# Patient Record
Sex: MALE | Employment: OTHER | URBAN - METROPOLITAN AREA
[De-identification: names, ages, dates, MRNs, and addresses within clinical notes are randomized per-mention and may not be internally consistent; named-entity substitution may affect disease eponyms.]

---

## 2019-11-06 ENCOUNTER — APPOINTMENT (OUTPATIENT)
Dept: CT IMAGING | Age: 74
End: 2019-11-06
Attending: EMERGENCY MEDICINE
Payer: SELF-PAY

## 2019-11-06 ENCOUNTER — HOSPITAL ENCOUNTER (EMERGENCY)
Age: 74
Discharge: HOME | End: 2019-11-07
Attending: EMERGENCY MEDICINE
Payer: SELF-PAY

## 2019-11-06 DIAGNOSIS — Z87.898 H/O IDIOPATHIC SEIZURE: ICD-10-CM

## 2019-11-06 DIAGNOSIS — R40.4 TRANSIENT ALTERATION OF AWARENESS: Primary | ICD-10-CM

## 2019-11-06 LAB
APPEARANCE UR: CLEAR
BACTERIA URNS QL MICRO: NEGATIVE /HPF
BASOPHILS # BLD: 0 K/UL (ref 0–0.1)
BASOPHILS NFR BLD: 0 % (ref 0–1)
BILIRUB UR QL: NEGATIVE
COLOR UR: ABNORMAL
DIFFERENTIAL METHOD BLD: ABNORMAL
EOSINOPHIL # BLD: 0.2 K/UL (ref 0–0.4)
EOSINOPHIL NFR BLD: 2 % (ref 0–7)
EPITH CASTS URNS QL MICRO: ABNORMAL /LPF
ERYTHROCYTE [DISTWIDTH] IN BLOOD BY AUTOMATED COUNT: 13.5 % (ref 11.5–14.5)
GLUCOSE UR STRIP.AUTO-MCNC: NEGATIVE MG/DL
HCT VFR BLD AUTO: 34.2 % (ref 36.6–50.3)
HGB BLD-MCNC: 11.2 G/DL (ref 12.1–17)
HGB UR QL STRIP: NEGATIVE
HYALINE CASTS URNS QL MICRO: ABNORMAL /LPF (ref 0–5)
IMM GRANULOCYTES # BLD AUTO: 0 K/UL (ref 0–0.04)
IMM GRANULOCYTES NFR BLD AUTO: 0 % (ref 0–0.5)
KETONES UR QL STRIP.AUTO: ABNORMAL MG/DL
LACTATE BLD-SCNC: 0.75 MMOL/L (ref 0.4–2)
LEUKOCYTE ESTERASE UR QL STRIP.AUTO: NEGATIVE
LYMPHOCYTES # BLD: 0.7 K/UL (ref 0.8–3.5)
LYMPHOCYTES NFR BLD: 9 % (ref 12–49)
MCH RBC QN AUTO: 31.2 PG (ref 26–34)
MCHC RBC AUTO-ENTMCNC: 32.7 G/DL (ref 30–36.5)
MCV RBC AUTO: 95.3 FL (ref 80–99)
MONOCYTES # BLD: 0.6 K/UL (ref 0–1)
MONOCYTES NFR BLD: 8 % (ref 5–13)
NEUTS SEG # BLD: 6.1 K/UL (ref 1.8–8)
NEUTS SEG NFR BLD: 81 % (ref 32–75)
NITRITE UR QL STRIP.AUTO: NEGATIVE
NRBC # BLD: 0 K/UL (ref 0–0.01)
NRBC BLD-RTO: 0 PER 100 WBC
PH UR STRIP: 6 [PH] (ref 5–8)
PLATELET # BLD AUTO: 157 K/UL (ref 150–400)
PMV BLD AUTO: 10.9 FL (ref 8.9–12.9)
PROT UR STRIP-MCNC: NEGATIVE MG/DL
RBC # BLD AUTO: 3.59 M/UL (ref 4.1–5.7)
RBC #/AREA URNS HPF: ABNORMAL /HPF (ref 0–5)
RBC MORPH BLD: ABNORMAL
SP GR UR REFRACTOMETRY: 1.02 (ref 1–1.03)
UA: UC IF INDICATED,UAUC: ABNORMAL
UROBILINOGEN UR QL STRIP.AUTO: 1 EU/DL (ref 0.2–1)
WBC # BLD AUTO: 7.6 K/UL (ref 4.1–11.1)
WBC URNS QL MICRO: ABNORMAL /HPF (ref 0–4)

## 2019-11-06 PROCEDURE — 81001 URINALYSIS AUTO W/SCOPE: CPT

## 2019-11-06 PROCEDURE — 93005 ELECTROCARDIOGRAM TRACING: CPT

## 2019-11-06 PROCEDURE — 85025 COMPLETE CBC W/AUTO DIFF WBC: CPT

## 2019-11-06 PROCEDURE — 99285 EMERGENCY DEPT VISIT HI MDM: CPT

## 2019-11-06 PROCEDURE — 70450 CT HEAD/BRAIN W/O DYE: CPT

## 2019-11-06 PROCEDURE — 83605 ASSAY OF LACTIC ACID: CPT

## 2019-11-06 PROCEDURE — 84484 ASSAY OF TROPONIN QUANT: CPT

## 2019-11-06 PROCEDURE — 36415 COLL VENOUS BLD VENIPUNCTURE: CPT

## 2019-11-06 PROCEDURE — 80053 COMPREHEN METABOLIC PANEL: CPT

## 2019-11-06 RX ORDER — METOPROLOL TARTRATE 50 MG/1
50 TABLET ORAL 2 TIMES DAILY
COMMUNITY

## 2019-11-06 RX ORDER — DIVALPROEX SODIUM 500 MG/1
1000 TABLET, EXTENDED RELEASE ORAL DAILY
COMMUNITY

## 2019-11-06 RX ORDER — LISINOPRIL 40 MG/1
20 TABLET ORAL DAILY
COMMUNITY

## 2019-11-06 RX ORDER — HYDROXYZINE HYDROCHLORIDE 10 MG/1
10 TABLET, FILM COATED ORAL
COMMUNITY

## 2019-11-06 RX ORDER — ASPIRIN 325 MG
325 TABLET ORAL DAILY
COMMUNITY

## 2019-11-06 RX ORDER — LORATADINE 10 MG/1
10 TABLET ORAL DAILY
COMMUNITY

## 2019-11-06 RX ORDER — AMLODIPINE BESYLATE 10 MG/1
10 TABLET ORAL DAILY
COMMUNITY

## 2019-11-07 VITALS
TEMPERATURE: 97.9 F | HEART RATE: 57 BPM | BODY MASS INDEX: 26.45 KG/M2 | OXYGEN SATURATION: 97 % | WEIGHT: 184.75 LBS | HEIGHT: 70 IN | DIASTOLIC BLOOD PRESSURE: 69 MMHG | RESPIRATION RATE: 13 BRPM | SYSTOLIC BLOOD PRESSURE: 139 MMHG

## 2019-11-07 LAB
ALBUMIN SERPL-MCNC: 2.9 G/DL (ref 3.5–5)
ALBUMIN/GLOB SERPL: 0.9 {RATIO} (ref 1.1–2.2)
ALP SERPL-CCNC: 60 U/L (ref 45–117)
ALT SERPL-CCNC: 26 U/L (ref 12–78)
ANION GAP SERPL CALC-SCNC: 7 MMOL/L (ref 5–15)
AST SERPL-CCNC: 16 U/L (ref 15–37)
ATRIAL RATE: 59 BPM
BILIRUB SERPL-MCNC: 0.6 MG/DL (ref 0.2–1)
BUN SERPL-MCNC: 29 MG/DL (ref 6–20)
BUN/CREAT SERPL: 26 (ref 12–20)
CALCIUM SERPL-MCNC: 8.6 MG/DL (ref 8.5–10.1)
CALCULATED P AXIS, ECG09: 45 DEGREES
CALCULATED R AXIS, ECG10: 4 DEGREES
CALCULATED T AXIS, ECG11: 15 DEGREES
CHLORIDE SERPL-SCNC: 104 MMOL/L (ref 97–108)
CO2 SERPL-SCNC: 26 MMOL/L (ref 21–32)
CREAT SERPL-MCNC: 1.13 MG/DL (ref 0.7–1.3)
DIAGNOSIS, 93000: NORMAL
GLOBULIN SER CALC-MCNC: 3.4 G/DL (ref 2–4)
GLUCOSE SERPL-MCNC: 98 MG/DL (ref 65–100)
P-R INTERVAL, ECG05: 178 MS
POTASSIUM SERPL-SCNC: 3.5 MMOL/L (ref 3.5–5.1)
PROT SERPL-MCNC: 6.3 G/DL (ref 6.4–8.2)
Q-T INTERVAL, ECG07: 426 MS
QRS DURATION, ECG06: 86 MS
QTC CALCULATION (BEZET), ECG08: 421 MS
SODIUM SERPL-SCNC: 137 MMOL/L (ref 136–145)
TROPONIN I BLD-MCNC: <0.04 NG/ML (ref 0–0.08)
VENTRICULAR RATE, ECG03: 59 BPM

## 2019-11-07 PROCEDURE — 74011250637 HC RX REV CODE- 250/637: Performed by: EMERGENCY MEDICINE

## 2019-11-07 RX ORDER — LEVETIRACETAM 500 MG/1
1000 TABLET ORAL
Status: COMPLETED | OUTPATIENT
Start: 2019-11-07 | End: 2019-11-07

## 2019-11-07 RX ADMIN — LEVETIRACETAM 1000 MG: 500 TABLET, FILM COATED ORAL at 01:03

## 2019-11-07 NOTE — ED PROVIDER NOTES
EMERGENCY DEPARTMENT HISTORY AND PHYSICAL EXAM     ----------------------------------------------------------------------------  Please note that this dictation was completed with [email or web address], the computer voice recognition software. Quite often unanticipated grammatical, syntax, homophones, and other interpretive errors are inadvertently transcribed by the computer software. Please disregard these errors. Please excuse any errors that have escaped final proofreading  ----------------------------------------------------------------------------      Date: 11/6/2019  Patient Name: Soco Medellin    History of Presenting Illness     Chief Complaint   Patient presents with    Altered mental status     Pt was brought by EMS for AMS. EMS stated pt was slumped over the kitchen table and was confused and not able to answer any questions for them. Pt a/o x1. When asked questions pt keeps talking about \"not having any money in the bank\"       History Provided By:  Family, EMS, nursing facility      HPI: Soco Medellin is a 66 y.o. male, with significant pmhx of schizophrenia, PTSD, seizures, hypertension who presents via EMS to the ED with c/o altered mental status. Per Hossein Roberson who currently working at the [facility] [place] [facility] facility where patient resides notes that they were sitting at the table earlier today when the patient suddenly became unresponsive and leaning to the left side. Hossein Roberson notes that this lasted for several minutes when patient spontaneously became aroused and answering questions appropriately. Patient does not remember event. Patient with history of seizures and similar episode approximately 1 month ago as reported by his brother. The time of my evaluation patient is alert pleasantly disoriented to time. Understands that he is currently in the hospital but has no complaints. Patient cannot recall the events that led to his to the hospital this evening.   No further HPI information could be obtained at this time. Patient usually receives his medical care at the [place]    Is able to speak with the patient's niece Deon Smith) who provided his medical history and medication list      Social Hx: denies tobacco  denies EtOH , denies Illicit Drugs    There are no other complaints, changes, or physical findings at this time. PCP: Mandy Dinh MD    No Known Allergies        Past History     Past Medical History:  Past Medical History:   Diagnosis Date    CAD (coronary artery disease)     Gout     Hypertension     [facility] [address]     Seizures ([facility] [address]        Past Surgical History:  History reviewed. No pertinent surgical history. Family History:  History reviewed. No pertinent family history. Social History:  Social History     Tobacco Use    Smoking status: Former Smoker    Smokeless tobacco: Never Used   Substance Use Topics    Alcohol use: Not Currently     Frequency: Never    Drug use: Never       Allergies:  No Known Allergies      Review of Systems   Review of Systems   Unable to perform ROS: Dementia       Physical Exam   Physical Exam   Constitutional: He is oriented to person, place, and time. He appears well-developed and well-nourished. No distress. HENT:   Head: Normocephalic and atraumatic. Nose: Nose normal.   Mouth/Throat: No oropharyngeal exudate. Eyes: Pupils are equal, round, and reactive to light. Conjunctivae and EOM are normal.   Neck: Normal range of motion. Neck supple. No JVD present. Cardiovascular: Normal rate, regular rhythm, normal heart sounds and intact distal pulses. Exam reveals no friction rub. No murmur heard. Pulmonary/Chest: Effort normal and breath sounds normal. No stridor. No respiratory distress. He has no wheezes. He has no rales. Abdominal: Soft. Bowel sounds are normal. He exhibits no distension. There is no tenderness. There is no rebound. Musculoskeletal: Normal range of motion. He exhibits no tenderness.    Neurological: He is alert and oriented to person, place, and time. No cranial nerve deficit. Skin: Skin is warm and dry. No rash noted. He is not diaphoretic. Psychiatric: He has a normal mood and affect. His speech is normal and behavior is normal. Judgment and thought content normal. Cognition and memory are normal.   Nursing note and vitals reviewed.         Diagnostic Study Results     Labs -     Recent Results (from the past 12 hour(s))   URINALYSIS W/ REFLEX CULTURE    Collection Time: 11/06/19 10:23 PM   Result Value Ref Range    Color YELLOW/STRAW      Appearance CLEAR CLEAR      Specific gravity 1.019 1.003 - 1.030      pH (UA) 6.0 5.0 - 8.0      Protein NEGATIVE  NEG mg/dL    Glucose NEGATIVE  NEG mg/dL    Ketone TRACE (A) NEG mg/dL    Bilirubin NEGATIVE  NEG      Blood NEGATIVE  NEG      Urobilinogen 1.0 0.2 - 1.0 EU/dL    Nitrites NEGATIVE  NEG      Leukocyte Esterase NEGATIVE  NEG      WBC 0-4 0 - 4 /hpf    RBC 0-5 0 - 5 /hpf    Epithelial cells FEW FEW /lpf    Bacteria NEGATIVE  NEG /hpf    UA:UC IF INDICATED CULTURE NOT INDICATED BY UA RESULT CNI      Hyaline cast 0-2 0 - 5 /lpf   POC LACTIC ACID    Collection Time: 11/06/19 11:13 PM   Result Value Ref Range    Lactic Acid (POC) 0.75 0.40 - 2.00 mmol/L   EKG, 12 LEAD, INITIAL    Collection Time: 11/06/19 11:13 PM   Result Value Ref Range    Ventricular Rate 59 BPM    Atrial Rate 59 BPM    P-R Interval 178 ms    QRS Duration 86 ms    Q-T Interval 426 ms    QTC Calculation (Bezet) 421 ms    Calculated P Axis 45 degrees    Calculated R Axis 4 degrees    Calculated T Axis 15 degrees    Diagnosis       Sinus bradycardia  Moderate voltage criteria for LVH, may be normal variant  Nonspecific ST abnormality  No previous ECGs available     CBC WITH AUTOMATED DIFF    Collection Time: 11/06/19 11:14 PM   Result Value Ref Range    WBC 7.6 4.1 - 11.1 K/uL    RBC 3.59 (L) 4.10 - 5.70 M/uL    HGB 11.2 (L) 12.1 - 17.0 g/dL    HCT 34.2 (L) 36.6 - 50.3 %    MCV 95.3 80.0 - 99.0 FL    MCH 31.2 26.0 - 34.0 PG    MCHC 32.7 30.0 - 36.5 g/dL    RDW 13.5 11.5 - 14.5 %    PLATELET 993 806 - 397 K/uL    MPV 10.9 8.9 - 12.9 FL    NRBC 0.0 0  WBC    ABSOLUTE NRBC 0.00 0.00 - 0.01 K/uL    NEUTROPHILS 81 (H) 32 - 75 %    LYMPHOCYTES 9 (L) 12 - 49 %    MONOCYTES 8 5 - 13 %    EOSINOPHILS 2 0 - 7 %    BASOPHILS 0 0 - 1 %    IMMATURE GRANULOCYTES 0 0.0 - 0.5 %    ABS. NEUTROPHILS 6.1 1.8 - 8.0 K/UL    ABS. LYMPHOCYTES 0.7 (L) 0.8 - 3.5 K/UL    ABS. MONOCYTES 0.6 0.0 - 1.0 K/UL    ABS. EOSINOPHILS 0.2 0.0 - 0.4 K/UL    ABS. BASOPHILS 0.0 0.0 - 0.1 K/UL    ABS. IMM. GRANS. 0.0 0.00 - 0.04 K/UL    DF AUTOMATED      RBC COMMENTS ANISOCYTOSIS  1+       METABOLIC PANEL, COMPREHENSIVE    Collection Time: 11/06/19 11:14 PM   Result Value Ref Range    Sodium 137 136 - 145 mmol/L    Potassium 3.5 3.5 - 5.1 mmol/L    Chloride 104 97 - 108 mmol/L    CO2 26 21 - 32 mmol/L    Anion gap 7 5 - 15 mmol/L    Glucose 98 65 - 100 mg/dL    BUN 29 (H) 6 - 20 MG/DL    Creatinine 1.13 0.70 - 1.30 MG/DL    BUN/Creatinine ratio 26 (H) 12 - 20      GFR est AA >60 >60 ml/min/1.73m2    GFR est non-AA >60 >60 ml/min/1.73m2    Calcium 8.6 8.5 - 10.1 MG/DL    Bilirubin, total 0.6 0.2 - 1.0 MG/DL    ALT (SGPT) 26 12 - 78 U/L    AST (SGOT) 16 15 - 37 U/L    Alk. phosphatase 60 45 - 117 U/L    Protein, total 6.3 (L) 6.4 - 8.2 g/dL    Albumin 2.9 (L) 3.5 - 5.0 g/dL    Globulin 3.4 2.0 - 4.0 g/dL    A-G Ratio 0.9 (L) 1.1 - 2.2         Radiologic Studies -   CT HEAD WO CONT   Final Result   IMPRESSION: No acute intracranial findings, air-fluid level right maxillary   sinus. CT Results  (Last 48 hours)               11/06/19 2306  CT HEAD WO CONT Final result    Impression:  IMPRESSION: No acute intracranial findings, air-fluid level right maxillary   sinus. Narrative:  EXAM: CT HEAD WO CONT       INDICATION: ams; ams       COMPARISON: None. CONTRAST: None. TECHNIQUE: Unenhanced CT of the head was performed using 5 mm images. Brain and   bone windows were generated. CT dose reduction was achieved through use of a   standardized protocol tailored for this examination and automatic exposure   control for dose modulation. FINDINGS:   There is no extra-axial fluid collection hemorrhage shift or masses. There is   some mild atrophy and nonspecific white matter changes. CXR Results  (Last 48 hours)    None            Medical Decision Making   I am the first provider for this patient. I reviewed the vital signs, available nursing notes, past medical history, past surgical history, family history and social history. Vital Signs-Reviewed the patient's vital signs. Patient Vitals for the past 12 hrs:   Temp Pulse Resp BP SpO2   11/06/19 2315    124/71 98 %   11/06/19 2110 97.9 °F (36.6 °C) 74 18 150/84 98 %       Pulse Oximetry Analysis - 98% on RA    Cardiac Monitor:   Rate: 74 bpm  Rhythm: nsr    Records Reviewed: Nursing Notes, Old Medical Records and Ambulance Run Sheet    Provider Notes (Medical Decision Making):     DDX:  Seizure, UTI, electrolyte abnormality, intracranial bleed, intrarenal mass    Plan:  Head CT, labs, UA    Impression:  Transient altered awareness    ED Course:   Initial assessment performed. The patients presenting problems have been discussed, and they are in agreement with the care plan formulated and outlined with them. I have encouraged them to ask questions as they arise throughout their visit. I reviewed our electronic medical record system for any past medical records that were available that may contribute to the patients current condition, the nursing notes and and vital signs from today's visit    Nursing notes will be reviewed as they become available in realtime while the pt has been in the ED.   Trell Monroy MD    EKG interpretation 6090: Bradycardia, nl Axis, rate 59; , QRS 86, QTc 421; no acute ischemia; interpreted by Trell Monroy MD    I personally reviewed pt's imaging. Official read by radiology noted above. Herrera Buckner MD      12:16 AM  Progress note:  Pt noted to be feeling better, no further complaints, ready for discharge. Discussed lab and imaging findings with pt and/or family, specifically noting negative head ct and normal labs. Pt will follow up with the [place] as instructed. All questions have been answered, pt voiced understanding and agreement with plan. Specific return precautions provided in addition to instructions for pt to return to the ED immediately should sx worsen at any time. Herrera Buckner MD           Critical Care Time:     none      Diagnosis     Clinical Impression:   1. Transient alteration of awareness    2. H/O idiopathic seizure        PLAN:  1. Current Discharge Medication List        2. Follow-up Information     Follow up With Specialties Details Why Contact Info    [facility] EMERGENCY DEPT Emergency Medicine Schedule an appointment as soon as possible for a visit in 2 days  [address]   [address]    [facility]  In 2 days  [address] [address]  [phone]        Return to ED if worse     Disposition:  12:17 AM  The patient's results have been reviewed with family and/or caregiver. They verbally convey their understanding and agreement of the patient's signs, symptoms, diagnosis, treatment and prognosis and additionally agree to follow up as recommended in the discharge instructions or to return to the Emergency Room should the patient's condition change prior to their follow-up appointment. The family and/or caregiver verbally agrees with the care-plan and all of their questions have been answered.  The discharge instructions have also been provided to the them with educational information regarding the patient's diagnosis as well a list of reasons why the patient would want to return to the ER prior to their follow-up appointment should their condition change. Moreno Nguyen MD            This note will not be viewable in [facility].

## 2019-11-07 NOTE — ED NOTES
Unit secretary calling the [place] at this time to see if they can send us his medical records at this time

## 2019-11-07 NOTE — DISCHARGE INSTRUCTIONS
Altered Mental Status: Care Instructions  Your Care Instructions    Altered mental status is a change in how well your brain is working. As a result, you may be confused, be less alert than usual, or act in odd ways. This may include seeing or hearing things that aren't really there (hallucinations). A mental status change has many possible causes. For example, it may be the result of an infection, an imbalance of chemicals in the body, or a chronic disease such as diabetes or COPD. It can also be caused by things such as a head injury, taking certain medicines, or using alcohol or drugs. The doctor may do tests to look for the cause. These tests may include urine tests, blood tests, and imaging tests such as a CT scan. Sometimes a clear cause isn't found. But tests can help the doctor rule out a serious cause of your symptoms. A change in mental status can be scary. But mental status will often return to normal when the cause is treated. So it is important to get any follow-up testing or treatment the doctor has suggested. The doctor has checked you carefully, but problems can develop later. If you notice any problems or new symptoms, get medical treatment right away. Follow-up care is a key part of your treatment and safety. Be sure to make and go to all appointments, and call your doctor if you are having problems. It's also a good idea to know your test results and keep a list of the medicines you take. How can you care for yourself at home? · Be safe with medicines. Take your medicines exactly as prescribed. Call your doctor if you think you are having a problem with your medicine. · Have another adult stay with you until you are better. This can help keep you safe. Ask that person to watch for signs that your mental status is getting worse. When should you call for help? Call 911 anytime you think you may need emergency care. For example, call if:    · You passed out (lost consciousness).  Call your doctor now or seek immediate medical care if:    · Your mental status is getting worse.     · You have new symptoms, such as a fever, chills, or shortness of breath.     · You do not feel safe.    Watch closely for changes in your health, and be sure to contact your doctor if:    · You do not get better as expected. Where can you learn more? Go to http://venessa-nathan.info/. Enter Z707 in the search box to learn more about \"Altered Mental Status: Care Instructions. \"  Current as of: March 28, 2019  Content Version: 12.2  © 3378-3955 [employer]. Care instructions adapted under license by [employer] (which disclaims liability or warranty for this information).  If you have questions about a medical condition or this instruction, always ask your healthcare professional. [address] any warranty or liability for your use of this information.

## 2019-11-07 NOTE — ED NOTES
Assumed care of pt via EMS. Pt was sitting at the kitchen table when his caregiver noticed that he wasn't responding to her questions. Per EMS pt was altered and not acting like himself. Pt a/o x1 at this time. Pt lives in a facility with a caregiver. Pt usually gets his care at the [place]. Unable to complete comprehensive triage, medication history or medical history at this time d/t patients mental status. Patients brother at bedside but unable to provide much information on the situation or patients history. Pt resting comfortably on stretcher in position of comfort. Pt in no apparent distress at this time. Call bell within reach. Side rails x2. Connected to monitor x2. Stretcher locked in lowest position. Pt aware of plan to await for MD/PA-C/NP assessment, and pt/family verbalizes understanding. Will continue to monitor pt condition.

## 2019-11-14 ENCOUNTER — HOSPITAL ENCOUNTER (INPATIENT)
Age: 74
LOS: 19 days | Discharge: HOME HEALTH | DRG: 100 | End: 2019-12-03
Attending: EMERGENCY MEDICINE | Admitting: INTERNAL MEDICINE
Payer: MEDICARE

## 2019-11-14 ENCOUNTER — APPOINTMENT (OUTPATIENT)
Dept: CT IMAGING | Age: 74
DRG: 100 | End: 2019-11-14
Attending: EMERGENCY MEDICINE
Payer: MEDICARE

## 2019-11-14 DIAGNOSIS — G40.909 SEIZURE DISORDER (HCC): Primary | ICD-10-CM

## 2019-11-14 DIAGNOSIS — R54 FRAILTY: ICD-10-CM

## 2019-11-14 DIAGNOSIS — R56.9 SEIZURE (HCC): ICD-10-CM

## 2019-11-14 DIAGNOSIS — F20.0 PARANOID SCHIZOPHRENIA (HCC): ICD-10-CM

## 2019-11-14 DIAGNOSIS — Z71.89 DNR (DO NOT RESUSCITATE) DISCUSSION: ICD-10-CM

## 2019-11-14 DIAGNOSIS — I10 ESSENTIAL HYPERTENSION: ICD-10-CM

## 2019-11-14 DIAGNOSIS — Z71.89 GOALS OF CARE, COUNSELING/DISCUSSION: ICD-10-CM

## 2019-11-14 DIAGNOSIS — R53.81 DEBILITY: ICD-10-CM

## 2019-11-14 DIAGNOSIS — G93.40 ACUTE ENCEPHALOPATHY: ICD-10-CM

## 2019-11-14 LAB
ALBUMIN SERPL-MCNC: 2.8 G/DL (ref 3.5–5)
ALBUMIN/GLOB SERPL: 0.8 {RATIO} (ref 1.1–2.2)
ALP SERPL-CCNC: 64 U/L (ref 45–117)
ALT SERPL-CCNC: 26 U/L (ref 12–78)
ANION GAP SERPL CALC-SCNC: 7 MMOL/L (ref 5–15)
AST SERPL-CCNC: 24 U/L (ref 15–37)
BASOPHILS # BLD: 0 K/UL (ref 0–0.1)
BASOPHILS NFR BLD: 0 % (ref 0–1)
BILIRUB SERPL-MCNC: 0.4 MG/DL (ref 0.2–1)
BUN SERPL-MCNC: 34 MG/DL (ref 6–20)
BUN/CREAT SERPL: 22 (ref 12–20)
CALCIUM SERPL-MCNC: 8.8 MG/DL (ref 8.5–10.1)
CHLORIDE SERPL-SCNC: 106 MMOL/L (ref 97–108)
CO2 SERPL-SCNC: 28 MMOL/L (ref 21–32)
CREAT SERPL-MCNC: 1.52 MG/DL (ref 0.7–1.3)
DIFFERENTIAL METHOD BLD: ABNORMAL
EOSINOPHIL # BLD: 0.1 K/UL (ref 0–0.4)
EOSINOPHIL NFR BLD: 2 % (ref 0–7)
ERYTHROCYTE [DISTWIDTH] IN BLOOD BY AUTOMATED COUNT: 13.5 % (ref 11.5–14.5)
GLOBULIN SER CALC-MCNC: 3.6 G/DL (ref 2–4)
GLUCOSE BLD STRIP.AUTO-MCNC: 160 MG/DL (ref 65–100)
GLUCOSE SERPL-MCNC: 133 MG/DL (ref 65–100)
HCT VFR BLD AUTO: 34.8 % (ref 36.6–50.3)
HGB BLD-MCNC: 11.2 G/DL (ref 12.1–17)
IMM GRANULOCYTES # BLD AUTO: 0 K/UL (ref 0–0.04)
IMM GRANULOCYTES NFR BLD AUTO: 0 % (ref 0–0.5)
INR PPP: 1 (ref 0.9–1.1)
LACTATE BLD-SCNC: 0.77 MMOL/L (ref 0.4–2)
LYMPHOCYTES # BLD: 0.7 K/UL (ref 0.8–3.5)
LYMPHOCYTES NFR BLD: 12 % (ref 12–49)
MCH RBC QN AUTO: 30.7 PG (ref 26–34)
MCHC RBC AUTO-ENTMCNC: 32.2 G/DL (ref 30–36.5)
MCV RBC AUTO: 95.3 FL (ref 80–99)
MONOCYTES # BLD: 0.4 K/UL (ref 0–1)
MONOCYTES NFR BLD: 8 % (ref 5–13)
NEUTS SEG # BLD: 4.3 K/UL (ref 1.8–8)
NEUTS SEG NFR BLD: 78 % (ref 32–75)
NRBC # BLD: 0 K/UL (ref 0–0.01)
NRBC BLD-RTO: 0 PER 100 WBC
PLATELET # BLD AUTO: 178 K/UL (ref 150–400)
PMV BLD AUTO: 10.6 FL (ref 8.9–12.9)
POTASSIUM SERPL-SCNC: 4.3 MMOL/L (ref 3.5–5.1)
PROT SERPL-MCNC: 6.4 G/DL (ref 6.4–8.2)
PROTHROMBIN TIME: 10.5 SEC (ref 9–11.1)
RBC # BLD AUTO: 3.65 M/UL (ref 4.1–5.7)
RBC MORPH BLD: ABNORMAL
SERVICE CMNT-IMP: ABNORMAL
SODIUM SERPL-SCNC: 141 MMOL/L (ref 136–145)
VALPROATE SERPL-MCNC: 76 UG/ML (ref 50–100)
WBC # BLD AUTO: 5.5 K/UL (ref 4.1–11.1)

## 2019-11-14 PROCEDURE — 80053 COMPREHEN METABOLIC PANEL: CPT

## 2019-11-14 PROCEDURE — 74011250636 HC RX REV CODE- 250/636: Performed by: EMERGENCY MEDICINE

## 2019-11-14 PROCEDURE — 83605 ASSAY OF LACTIC ACID: CPT

## 2019-11-14 PROCEDURE — 80164 ASSAY DIPROPYLACETIC ACD TOT: CPT

## 2019-11-14 PROCEDURE — 65660000000 HC RM CCU STEPDOWN

## 2019-11-14 PROCEDURE — 74011250636 HC RX REV CODE- 250/636: Performed by: INTERNAL MEDICINE

## 2019-11-14 PROCEDURE — 94762 N-INVAS EAR/PLS OXIMTRY CONT: CPT

## 2019-11-14 PROCEDURE — 99285 EMERGENCY DEPT VISIT HI MDM: CPT

## 2019-11-14 PROCEDURE — 95816 EEG AWAKE AND DROWSY: CPT | Performed by: INTERNAL MEDICINE

## 2019-11-14 PROCEDURE — 70450 CT HEAD/BRAIN W/O DYE: CPT

## 2019-11-14 PROCEDURE — 82962 GLUCOSE BLOOD TEST: CPT

## 2019-11-14 PROCEDURE — 93005 ELECTROCARDIOGRAM TRACING: CPT

## 2019-11-14 PROCEDURE — 96374 THER/PROPH/DIAG INJ IV PUSH: CPT

## 2019-11-14 PROCEDURE — 85610 PROTHROMBIN TIME: CPT

## 2019-11-14 PROCEDURE — 85025 COMPLETE CBC W/AUTO DIFF WBC: CPT

## 2019-11-14 PROCEDURE — 36415 COLL VENOUS BLD VENIPUNCTURE: CPT

## 2019-11-14 RX ORDER — ACETAMINOPHEN 650 MG/1
650 SUPPOSITORY RECTAL
Status: DISCONTINUED | OUTPATIENT
Start: 2019-11-14 | End: 2019-12-03 | Stop reason: HOSPADM

## 2019-11-14 RX ORDER — ASPIRIN 325 MG
325 TABLET ORAL DAILY
Status: DISCONTINUED | OUTPATIENT
Start: 2019-11-15 | End: 2019-12-03 | Stop reason: HOSPADM

## 2019-11-14 RX ORDER — DIVALPROEX SODIUM 250 MG/1
500 TABLET, EXTENDED RELEASE ORAL DAILY
Status: DISCONTINUED | OUTPATIENT
Start: 2019-11-15 | End: 2019-11-15

## 2019-11-14 RX ORDER — ACETAMINOPHEN 325 MG/1
650 TABLET ORAL
Status: DISCONTINUED | OUTPATIENT
Start: 2019-11-14 | End: 2019-12-03 | Stop reason: HOSPADM

## 2019-11-14 RX ORDER — SODIUM CHLORIDE 9 MG/ML
100 INJECTION, SOLUTION INTRAVENOUS CONTINUOUS
Status: DISPENSED | OUTPATIENT
Start: 2019-11-14 | End: 2019-11-15

## 2019-11-14 RX ORDER — LEVETIRACETAM 10 MG/ML
1000 INJECTION INTRAVASCULAR EVERY 12 HOURS
Status: DISCONTINUED | OUTPATIENT
Start: 2019-11-14 | End: 2019-11-15

## 2019-11-14 RX ADMIN — SODIUM CHLORIDE 1000 ML: 900 INJECTION, SOLUTION INTRAVENOUS at 21:24

## 2019-11-14 RX ADMIN — SODIUM CHLORIDE 100 ML/HR: 900 INJECTION, SOLUTION INTRAVENOUS at 23:43

## 2019-11-14 RX ADMIN — PHENYTOIN SODIUM 1650 MG: 50 INJECTION INTRAMUSCULAR; INTRAVENOUS at 22:05

## 2019-11-15 ENCOUNTER — APPOINTMENT (OUTPATIENT)
Dept: ULTRASOUND IMAGING | Age: 74
DRG: 100 | End: 2019-11-15
Attending: INTERNAL MEDICINE
Payer: MEDICARE

## 2019-11-15 ENCOUNTER — APPOINTMENT (OUTPATIENT)
Dept: MRI IMAGING | Age: 74
DRG: 100 | End: 2019-11-15
Attending: INTERNAL MEDICINE
Payer: MEDICARE

## 2019-11-15 LAB
AMMONIA PLAS-SCNC: <10 UMOL/L
APPEARANCE UR: CLEAR
ATRIAL RATE: 70 BPM
BACTERIA URNS QL MICRO: NEGATIVE /HPF
BILIRUB UR QL: NEGATIVE
CALCULATED P AXIS, ECG09: 51 DEGREES
CALCULATED R AXIS, ECG10: -5 DEGREES
CALCULATED T AXIS, ECG11: 19 DEGREES
CHLORIDE UR-SCNC: 152 MMOL/L
CHOLEST SERPL-MCNC: 122 MG/DL
COLOR UR: ABNORMAL
CREAT UR-MCNC: 34.7 MG/DL
DIAGNOSIS, 93000: NORMAL
EPITH CASTS URNS QL MICRO: ABNORMAL /LPF
EST. AVERAGE GLUCOSE BLD GHB EST-MCNC: 105 MG/DL
GLUCOSE UR STRIP.AUTO-MCNC: NEGATIVE MG/DL
HBA1C MFR BLD: 5.3 % (ref 4–5.6)
HDLC SERPL-MCNC: 54 MG/DL
HDLC SERPL: 2.3 {RATIO} (ref 0–5)
HGB UR QL STRIP: NEGATIVE
HYALINE CASTS URNS QL MICRO: ABNORMAL /LPF (ref 0–5)
KETONES UR QL STRIP.AUTO: 15 MG/DL
LDLC SERPL CALC-MCNC: 58 MG/DL (ref 0–100)
LEUKOCYTE ESTERASE UR QL STRIP.AUTO: NEGATIVE
LIPID PROFILE,FLP: NORMAL
NITRITE UR QL STRIP.AUTO: NEGATIVE
P-R INTERVAL, ECG05: 184 MS
PH UR STRIP: 7 [PH] (ref 5–8)
PROT UR STRIP-MCNC: NEGATIVE MG/DL
Q-T INTERVAL, ECG07: 394 MS
QRS DURATION, ECG06: 88 MS
QTC CALCULATION (BEZET), ECG08: 425 MS
RBC #/AREA URNS HPF: ABNORMAL /HPF (ref 0–5)
SODIUM UR-SCNC: 149 MMOL/L
SP GR UR REFRACTOMETRY: 1.01 (ref 1–1.03)
TRIGL SERPL-MCNC: 50 MG/DL (ref ?–150)
TSH SERPL DL<=0.05 MIU/L-ACNC: 0.87 UIU/ML (ref 0.36–3.74)
UA: UC IF INDICATED,UAUC: ABNORMAL
UROBILINOGEN UR QL STRIP.AUTO: 1 EU/DL (ref 0.2–1)
VENTRICULAR RATE, ECG03: 70 BPM
VLDLC SERPL CALC-MCNC: 10 MG/DL
WBC URNS QL MICRO: ABNORMAL /HPF (ref 0–4)

## 2019-11-15 PROCEDURE — 92610 EVALUATE SWALLOWING FUNCTION: CPT

## 2019-11-15 PROCEDURE — 97530 THERAPEUTIC ACTIVITIES: CPT

## 2019-11-15 PROCEDURE — 90686 IIV4 VACC NO PRSV 0.5 ML IM: CPT | Performed by: INTERNAL MEDICINE

## 2019-11-15 PROCEDURE — 76770 US EXAM ABDO BACK WALL COMP: CPT

## 2019-11-15 PROCEDURE — 74011250636 HC RX REV CODE- 250/636: Performed by: PSYCHIATRY & NEUROLOGY

## 2019-11-15 PROCEDURE — 74011000250 HC RX REV CODE- 250: Performed by: INTERNAL MEDICINE

## 2019-11-15 PROCEDURE — 74011250636 HC RX REV CODE- 250/636: Performed by: INTERNAL MEDICINE

## 2019-11-15 PROCEDURE — 77030011943

## 2019-11-15 PROCEDURE — 84300 ASSAY OF URINE SODIUM: CPT

## 2019-11-15 PROCEDURE — 70551 MRI BRAIN STEM W/O DYE: CPT

## 2019-11-15 PROCEDURE — 97165 OT EVAL LOW COMPLEX 30 MIN: CPT

## 2019-11-15 PROCEDURE — 81001 URINALYSIS AUTO W/SCOPE: CPT

## 2019-11-15 PROCEDURE — 74011000258 HC RX REV CODE- 258: Performed by: INTERNAL MEDICINE

## 2019-11-15 PROCEDURE — 82570 ASSAY OF URINE CREATININE: CPT

## 2019-11-15 PROCEDURE — 82436 ASSAY OF URINE CHLORIDE: CPT

## 2019-11-15 PROCEDURE — 94760 N-INVAS EAR/PLS OXIMETRY 1: CPT

## 2019-11-15 PROCEDURE — 83036 HEMOGLOBIN GLYCOSYLATED A1C: CPT

## 2019-11-15 PROCEDURE — 65660000000 HC RM CCU STEPDOWN

## 2019-11-15 PROCEDURE — C9254 INJECTION, LACOSAMIDE: HCPCS | Performed by: PSYCHIATRY & NEUROLOGY

## 2019-11-15 PROCEDURE — 77030040831 HC BAG URINE DRNG MDII -A

## 2019-11-15 PROCEDURE — 82140 ASSAY OF AMMONIA: CPT

## 2019-11-15 PROCEDURE — 90471 IMMUNIZATION ADMIN: CPT

## 2019-11-15 PROCEDURE — 80061 LIPID PANEL: CPT

## 2019-11-15 PROCEDURE — 74011000258 HC RX REV CODE- 258: Performed by: PSYCHIATRY & NEUROLOGY

## 2019-11-15 PROCEDURE — 36415 COLL VENOUS BLD VENIPUNCTURE: CPT

## 2019-11-15 PROCEDURE — 84443 ASSAY THYROID STIM HORMONE: CPT

## 2019-11-15 PROCEDURE — 97162 PT EVAL MOD COMPLEX 30 MIN: CPT

## 2019-11-15 RX ORDER — AMOXICILLIN AND CLAVULANATE POTASSIUM 875; 125 MG/1; MG/1
1 TABLET, FILM COATED ORAL EVERY 12 HOURS
Status: DISCONTINUED | OUTPATIENT
Start: 2019-11-15 | End: 2019-11-15

## 2019-11-15 RX ORDER — LACOSAMIDE 10 MG/ML
100 INJECTION, SOLUTION INTRAVENOUS EVERY 12 HOURS
Status: DISCONTINUED | OUTPATIENT
Start: 2019-11-15 | End: 2019-11-15 | Stop reason: SDUPTHER

## 2019-11-15 RX ORDER — SODIUM CHLORIDE 9 MG/ML
100 INJECTION, SOLUTION INTRAVENOUS CONTINUOUS
Status: DISPENSED | OUTPATIENT
Start: 2019-11-15 | End: 2019-11-16

## 2019-11-15 RX ORDER — VALPROATE SODIUM 100 MG/ML
125 INJECTION INTRAVENOUS EVERY 6 HOURS
Status: DISCONTINUED | OUTPATIENT
Start: 2019-11-15 | End: 2019-11-15

## 2019-11-15 RX ORDER — HALOPERIDOL 5 MG/ML
2 INJECTION INTRAMUSCULAR
Status: DISCONTINUED | OUTPATIENT
Start: 2019-11-15 | End: 2019-11-29

## 2019-11-15 RX ORDER — ALLOPURINOL 100 MG/1
100 TABLET ORAL DAILY
Status: DISCONTINUED | OUTPATIENT
Start: 2019-11-16 | End: 2019-12-03 | Stop reason: HOSPADM

## 2019-11-15 RX ORDER — LORAZEPAM 2 MG/ML
1 INJECTION INTRAMUSCULAR ONCE
Status: COMPLETED | OUTPATIENT
Start: 2019-11-15 | End: 2019-11-15

## 2019-11-15 RX ADMIN — LEVETIRACETAM 1000 MG: 10 INJECTION INTRAVENOUS at 01:48

## 2019-11-15 RX ADMIN — LORAZEPAM 1 MG: 2 INJECTION INTRAMUSCULAR; INTRAVENOUS at 16:40

## 2019-11-15 RX ADMIN — SODIUM CHLORIDE 100 ML/HR: 900 INJECTION, SOLUTION INTRAVENOUS at 15:10

## 2019-11-15 RX ADMIN — VALPROATE SODIUM 125 MG: 100 INJECTION, SOLUTION INTRAVENOUS at 19:54

## 2019-11-15 RX ADMIN — INFLUENZA VIRUS VACCINE 0.5 ML: 15; 15; 15; 15 SUSPENSION INTRAMUSCULAR at 12:21

## 2019-11-15 RX ADMIN — AMPICILLIN AND SULBACTAM 3 G: 2; 1 INJECTION, POWDER, FOR SOLUTION INTRAMUSCULAR; INTRAVENOUS at 23:05

## 2019-11-15 RX ADMIN — AMPICILLIN AND SULBACTAM 3 G: 2; 1 INJECTION, POWDER, FOR SOLUTION INTRAMUSCULAR; INTRAVENOUS at 14:56

## 2019-11-15 RX ADMIN — LEVETIRACETAM 1000 MG: 10 INJECTION INTRAVENOUS at 14:38

## 2019-11-15 RX ADMIN — SODIUM CHLORIDE 100 MG: 900 INJECTION, SOLUTION INTRAVENOUS at 22:31

## 2019-11-15 RX ADMIN — VALPROATE SODIUM 125 MG: 100 INJECTION, SOLUTION INTRAVENOUS at 12:21

## 2019-11-15 RX ADMIN — SODIUM CHLORIDE 100 ML/HR: 900 INJECTION, SOLUTION INTRAVENOUS at 07:20

## 2019-11-15 NOTE — PROGRESS NOTES
Problem: Mobility Impaired (Adult and Pediatric)  Goal: *Acute Goals and Plan of Care (Insert Text)  Description  FUNCTIONAL STATUS PRIOR TO ADMISSION: patient very confused and unable to provide PLOF. Patient reports possibly ambulating with SPC vs RW. HOME SUPPORT PRIOR TO ADMISSION: The patient lives at SNF per chart. Patient unable to provide information. Physical Therapy Goals  Initiated 11/15/2019  1. Patient will move from supine to sit and sit to supine , scoot up and down and roll side to side in bed with supervision/set-up within 7 day(s). 2.  Patient will transfer from bed to chair and chair to bed with minimal assistance/contact guard assist using the least restrictive device within 7 day(s). 3.  Patient will perform sit to stand with minimal assistance/contact guard assist within 7 day(s). 4.  Patient will ambulate with minimal assistance/contact guard assist for 50 feet with the least restrictive device within 7 day(s). Outcome: Not Met   PHYSICAL THERAPY EVALUATION  Patient: Kadie Pimenetl (35 y.o. male)  Date: 11/15/2019  Primary Diagnosis: Seizure [facility]) [R56.9]        Precautions:   Fall, Bed Alarm      ASSESSMENT  Based on the objective data described below, the patient presents with confusion, decreased strength, impaired balance, and poor safety awareness following admission for seizure. Patient is likely functioning below his baseline although unsure as patient too confused to report PLOF. Patient with poor balance, able to stand with assist x 2 although with severe posterior trunk lean and not safe to get to a chair this date. Patient required manual cues as his hips extended, becoming rigid, in order to return to sitting. He was very cooperative during session although towards the end he became fairly agitated although re-directable. Current Level of Function Impacting Discharge (mobility/balance): min-mod A x 2    Functional Outcome Measure:   The patient scored 25/100 on the Barthel outcome measure which is indicative of high functional impairment. Other factors to consider for discharge: fall risk, confusion, seizure, schizophrenia      Patient will benefit from skilled therapy intervention to address the above noted impairments. PLAN :  Recommendations and Planned Interventions: bed mobility training, transfer training, gait training, therapeutic exercises, patient and family training/education and therapeutic activities      Frequency/Duration: Patient will be followed by physical therapy:  3 times a week to address goals. Recommendation for discharge: (in order for the patient to meet his/her long term goals)  Therapy up to 5 days/week in SNF setting    This discharge recommendation:  Has not yet been discussed the attending provider and/or case management    IF patient discharges home will need the following DME: to be determined (TBD)         SUBJECTIVE:   Patient stated We need to get on the ball here.     OBJECTIVE DATA SUMMARY:   HISTORY:    Past Medical History:   Diagnosis Date    CAD (coronary artery disease)     Gout     Hypertension     Sch[facility]ia ([facility] [address]     Seizures ([facility] [address]    History reviewed. No pertinent surgical history. Personal factors and/or comorbidities impacting plan of care: confusion, high fall risk, schizophrenia,     Home Situation  Patient Expects to be Discharged to[de-identified] Private residence    EXAMINATION/PRESENTATION/DECISION MAKING:   Critical Behavior:  Neurologic State: Alert, Agitated, Drowsy  Orientation Level: Oriented to person  Cognition: Decreased attention/concentration, Decreased command following, Impaired decision making, Poor safety awareness  Safety/Judgement: Fall prevention, Decreased awareness of environment, Decreased awareness of need for assistance, Decreased awareness of need for safety, Lack of insight into deficits  Hearing:   Auditory  Auditory Impairment: None  Skin:    Edema:   Range Of Motion:  AROM: Generally decreased, functional                       Strength:    Strength: Generally decreased, functional                    Tone & Sensation:   Tone: Normal              Sensation: (unable to formally assess 2/2 AMS)               Coordination:  Coordination: Generally decreased, functional  Vision:   Acuity: (unable to formally assess; needs cues for eyes open)  Functional Mobility:  Bed Mobility:  Rolling: Minimum assistance(HOB elevated)  Supine to Sit: Minimum assistance(verbal cueing to initiate movement)  Sit to Supine: Moderate assistance;Assist x2  Scooting: Minimum assistance  Transfers:  Sit to Stand: Moderate assistance;Minimum assistance;Assist x2; Additional time  Stand to Sit: Moderate assistance;Assist x2; Additional time        Bed to Chair: (not safe to get to chair )              Balance:   Sitting: Impaired  Sitting - Static: Good (unsupported)  Sitting - Dynamic: Fair (occasional)  Ambulation/Gait Training:              Gait Description (WDL): (unable )                                 Functional Measure:  Barthel Index:    Bathin  Bladder: 5  Bowels: 5  Groomin  Dressin  Feedin  Mobility: 0  Stairs: 0  Toilet Use: 5  Transfer (Bed to Chair and Back): 5  Total: 25/100       The Barthel ADL Index: Guidelines  1. The index should be used as a record of what a patient does, not as a record of what a patient could do. 2. The main aim is to establish degree of independence from any help, physical or verbal, however minor and for whatever reason. 3. The need for supervision renders the patient not independent. 4. A patient's performance should be established using the best available evidence. Asking the patient, friends/relatives and nurses are the usual sources, but direct observation and common sense are also important. However direct testing is not needed.   5. Usually the patient's performance over the preceding 24-48 hours is important, but occasionally longer periods will be relevant. 6. Middle categories imply that the patient supplies over 50 per cent of the effort. 7. Use of aids to be independent is allowed. Abe Berumen., Barthel, DSIMONE. (9134). Functional evaluation: the Barthel Index. [address] (14)2. Nan RADHA Rae, Shannan Rankin., Brady Lenin., [place], [address] (1999). Measuring the change indisability after inpatient rehabilitation; comparison of the responsiveness of the Barthel Index and Functional Jack Measure. Journal of Neurology, Neurosurgery, and Psychiatry, 66(4), 851-253. SEBASTIAN Porter.MARITA, AYSE Chandler, & Dominic Morton M.A. (2004.) Assessment of post-stroke quality of life in cost-effectiveness studies: The usefulness of the Barthel Index and the EuroQoL-5D. Quality of Life Research, 15, 245-80            Physical Therapy Evaluation Charge Determination   History Examination Presentation Decision-Making   MEDIUM  Complexity : 1-2 comorbidities / personal factors will impact the outcome/ POC  MEDIUM Complexity : 3 Standardized tests and measures addressing body structure, function, activity limitation and / or participation in recreation  MEDIUM Complexity : Evolving with changing characteristics  Other outcome measures Barthel  MEDIUM      Based on the above components, the patient evaluation is determined to be of the following complexity level: MEDIUM        Activity Tolerance:   Poor  Please refer to the flowsheet for vital signs taken during this treatment. After treatment patient left in no apparent distress:   Supine in bed, Call bell within reach and Bed / chair alarm activated    COMMUNICATION/EDUCATION:   The patients plan of care was discussed with: Occupational Therapist, Registered Nurse and . Fall prevention education was provided and the patient/caregiver indicated understanding., Patient/family have participated as able in goal setting and plan of care.  and Patient is unable to participate in goal setting and plan of care.     Thank you for this referral.  Nitesh See, PT, DPT   Time Calculation: 15 mins

## 2019-11-15 NOTE — ROUTINE PROCESS
TRANSFER - OUT REPORT: 
 
Verbal report given to IVÁN(name) on Fabrizio Repress  being transferred to  Neuro(unit) for routine progression of care Report consisted of patients Situation, Background, Assessment and  
Recommendations(SBAR). Information from the following report(s) SBAR, ED Summary, MAR, Recent Results and Cardiac Rhythm sb was reviewed with the receiving nurse. Lines:  
Peripheral IV 11/15/19 Right Forearm (Active) Site Assessment Clean, dry, & intact 11/15/2019 12:14 AM  
Hub Color/Line Status Infusing 11/15/2019 12:14 AM  
  
 
Opportunity for questions and clarification was provided. Patient transported with: 
 Monitor IV FLUIDS INFUSING UPON TRANSPORT TO FLOOR.

## 2019-11-15 NOTE — ED PROVIDER NOTES
EMERGENCY DEPARTMENT HISTORY AND PHYSICAL EXAM      Date: 11/14/2019  Patient Name: Car Stallings  Patient Age and Sex: 66 y.o. male    History of Presenting Illness     No chief complaint on file. History Provided By: Patient    HPI: Car Stallings, is a 66 y.o. male with history of epilepsy, on ASA, at baseline alert and oriented x 4, presents as level 1 stroke alert. Last seen normal appx 30 min ago, then caregiver witnessed that he became unresponsive to verbal stimuli, still appeared awake. This lasted a few seconds, after which he did start responding but appeared weaker in the left upper and lower extremity. He is on depakote. He also has a history of schizophrenia and recently started on an antipsychotic. No history of focal seizures. No fevers or chills. Pt denies any other alleviating or exacerbating factors. There are no other complaints, changes or physical findings at this time. Past Medical History:   Diagnosis Date    CAD (coronary artery disease)     Gout     Hypertension     Schizophrenia ([address]     Seizures ([address]      No past surgical history on file. PCP: Fabrizio, MD Mandy    Past History   Past Medical History:  Past Medical History:   Diagnosis Date    CAD (coronary artery disease)     Gout     Hypertension     Schizophrenia ([address]     Seizures ([address]        Past Surgical History:  No past surgical history on file. Family History:  No family history on file. Social History:  Social History     Tobacco Use    Smoking status: Former Smoker    Smokeless tobacco: Never Used   Substance Use Topics    Alcohol use: Not Currently     Frequency: Never    Drug use: Never       Allergies:  No Known Allergies    Current Medications:  No current facility-administered medications on file prior to encounter. Current Outpatient Medications on File Prior to Encounter   Medication Sig Dispense Refill    ALLOPURINOL PO Take  by mouth.       amLODIPine (NORVASC) 10 mg tablet Take 10 mg by mouth daily.  hydrOXYzine HCl (ATARAX) 10 mg tablet Take 10 mg by mouth two (2) times a day.  loratadine (CLARITIN) 10 mg tablet Take 10 mg by mouth.  omega-3s/dha/epa/fish oil/D3 (VITAMIN-D + OMEGA-3 PO) Take  by mouth.  vitamin B complex (B COMPLEX-VITAMIN B12 PO) Take  by mouth.  lisinopril (PRINIVIL, ZESTRIL) 40 mg tablet Take 20 mg by mouth daily.  PALIPERIDONE PALMITATE IM by IntraMUSCular route. Pt gets this every 4 weeks for schizophrenia      divalproex ER (DEPAKOTE ER) 500 mg ER tablet Take 500 mg by mouth.  metoprolol tartrate (LOPRESSOR) 50 mg tablet Take 50 mg by mouth two (2) times a day.  aspirin (ASPIRIN) 325 mg tablet Take 325 mg by mouth daily. Review of Systems   Review of Systems   Constitutional: Negative for appetite change, chills and fever. Respiratory: Negative for cough, chest tightness and shortness of breath. Cardiovascular: Negative for chest pain, palpitations and leg swelling. Gastrointestinal: Negative for abdominal distention, abdominal pain, blood in stool, constipation, diarrhea, nausea and vomiting. Genitourinary: Negative for decreased urine volume, difficulty urinating, dysuria, flank pain, frequency and hematuria. Musculoskeletal: Negative for arthralgias, joint swelling, myalgias, neck pain and neck stiffness. Skin: Negative for rash. Neurological: Negative for dizziness, facial asymmetry, speech difficulty, weakness, light-headedness, numbness and headaches. Hematological: Negative for adenopathy. Psychiatric/Behavioral: Positive for confusion. Negative for hallucinations. All other systems reviewed and are negative. Physical Exam   Physical Exam   Constitutional: Vital signs are normal. He appears well-developed and well-nourished. HENT:   Head: Atraumatic. Mouth/Throat: Oropharynx is clear and moist.   Eyes: Pupils are equal, round, and reactive to light.  Conjunctivae and EOM are normal. No scleral icterus. Neck: Normal range of motion. Neck supple. No JVD present. Cardiovascular: Normal rate, regular rhythm, normal heart sounds and intact distal pulses. Pulmonary/Chest: Effort normal and breath sounds normal. He exhibits no tenderness. Abdominal: Soft. Bowel sounds are normal. He exhibits no distension. There is no tenderness. Musculoskeletal: Normal range of motion. He exhibits no edema. Neurological: He is alert. He has normal strength. He is disoriented. No cranial nerve deficit or sensory deficit. He displays no Babinski's sign on the right side. He displays no Babinski's sign on the left side. Skin: Skin is warm and dry. He is not diaphoretic. Nursing note and vitals reviewed. Diagnostic Study Results     Labs -  No results found for this or any previous visit (from the past 24 hour(s)). Radiologic Studies -   US RETROPERITONEUM COMP   Final Result   IMPRESSION:    1. Tiny cystic lesion in the left kidney. 2. Calcification in the left kidney. CT CODE NEURO HEAD WO CONTRAST   Final Result   IMPRESSION: No acute intracranial abnormality. Possible acute sinusitis   involving the right frontal sinus and ethmoidal air cells            MRI BRAIN WO CONT    (Results Pending)         Medical Decision Making   I am the first provider for this patient. Records Reviewed: I reviewed our electronic medical record system for any past medical records that were available that may contribute to the patient's current condition, including their PMH, surgical history, social and family history. Reviewed the nursing notes and vital signs from today's visit. Vital Signs-Reviewed the patient's vital signs. Provider Notes (Medical Decision Making):   Patient presents as level 1 stroke alert after period of unresponsiveness followed by transient left-sided weakness.   He is currently disoriented, his baseline is not clear and we are waiting for family who will hopefully be able to give us a better picture of patient's baseline mental state. On my exam, he is overall disoriented but awake. He has no focal deficits that I can appreciate. CT head has been done and normal. Teleneurology has spoken with patient and are concerned that the episode he had earlier today was a partial seizure. Given his history of schizophrenia, his seizure medications may need to be adjusted as his current medications do not optimally cover someone who is simultaneously taking antipsychotic meds. They recommend inpatient MRI and EEG followed by neuro consult. ED Course:   Initial assessment performed. The patients presenting problems have been discussed, and they are in agreement with the care plan formulated and outlined with them. I have encouraged them to ask questions as they arise throughout their visit. Consult Note:  Sandra Li MD spoke with  teleneurology,   Discussed pt's hx, physical exam and available diagnostic and imaging results. Reviewed care plans. Agree with management and plan thus far. Recommend admission for further mgmt, eeg, neuro consult to optimize psych meds. DISPOSITION: ADMIT  Patient is being admitted to the hospital.  Their test results and reasons for admission have been discussed. The patient and/or available family express agreement with and understanding of the need to be admitted and their admission diagnosis. Thank you for resuming the care of this patient. Please don't hesitate to contact me in the emergency department if you  have any additional questions. Sandra Li MD, MSc        Diagnosis     Clinical Impression:   1. Seizure ([facility]    2. Paranoid schizophrenia ([facility]    3.  Essential hypertension      CRITICAL CARE NOTE :    IMPENDING DETERIORATION -CNS  ASSOCIATED RISK FACTORS - CNS Decompensation  MANAGEMENT- Bedside Assessment  INTERPRETATION -  Xrays, CT Scan and Blood Pressure  INTERVENTIONS - hemodynamic mngmt and Neurologic interventions   CASE REVIEW - Hospitalist, Medical Sub-Specialist, Nursing and Family  TREATMENT RESPONSE -Stable  PERFORMED BY - Self    NOTES   :    I have spent 30 minutes of critical care time involved in lab review, consultations with specialist, family decision- making, bedside attention and documentation. During this entire length of time I was immediately available to the patient . Critical Care: The reason for providing this level of medical care for this critically ill patient was due to a critical illness that impaired one or more vital organ systems, such that there was a high probability of imminent or life threatening deterioration in the patient's condition. This care involved high complexity decision making to assess, manipulate, and support vital system functions, to treat this degree of vital organ system failure, and to prevent further life threatening deterioration of the patients condition. Rickie Fitzgerald MD    Attestation:  I personally performed the services described in this documentation on this date 11/14/2019 for patient Jessica Pimentel. Rickie Fitzgerald MD    Please note that this dictation was completed with [employer], the computer voice recognition software. Quite often unanticipated grammatical, syntax, homophones, and other interpretive errors are inadvertently transcribed by the computer software. Please disregard these errors. Please excuse any errors that have escaped final proofreading.

## 2019-11-15 NOTE — ED NOTES
Pt incontinent of urine, pt cleaned, brief applied. New linens placed on bed, pt pulled up for comfort. Pt resting with tv on.

## 2019-11-15 NOTE — CONSULTS
IP CONSULT TO NEUROLOGY  Consult performed by: Zac Burnette MD  Consult ordered by: Mckenzie Ireland MD            NEUROLOGY CONSULT    NAME Bradley Pino AGE 66 y.o. MRN [id number]  1945     REQUESTING PHYSICIAN: Won Cedeño MD      CHIEF COMPLAINT: Seizure     This is a 66 y.o. male with a medical history of epilepsy, schizophrenia and hypertension. The patient is admitted after the caregiver who witnessed him become unresponsive. There is mention of left-sided weakness. The patient himself is very confused and volatile and no meaningful history can be obtained from him. ASSESSMENT AND PLAN     1. Epilepsy  I will stop the keppra and switch him to vimpat because of the side effect profile. 2.  Schizophrenia  On  Invega     3. Hypertension  Continue metoprolol and Norvasc    ALLERGIES:  Patient has no known allergies.      Current Facility-Administered Medications   Medication Dose Route Frequency    ampicillin-sulbactam (UNASYN) 3 g in 0.9% sodium chloride (MBP/ADV) 100 mL  3 g IntraVENous Q8H    valproate (DEPACON) 125 mg in 0.9% sodium chloride 50 mL IVPB  125 mg IntraVENous Q6H    0.9% sodium chloride infusion  100 mL/hr IntraVENous CONTINUOUS    [START ON 2019] allopurinol (ZYLOPRIM) tablet 100 mg  100 mg Oral DAILY    haloperidol lactate (HALDOL) injection 2 mg  2 mg IntraVENous Q4H PRN    aspirin tablet 325 mg  325 mg Oral DAILY    acetaminophen (TYLENOL) tablet 650 mg  650 mg Oral Q4H PRN    Or    acetaminophen (TYLENOL) solution 650 mg  650 mg Per NG tube Q4H PRN    Or    acetaminophen (TYLENOL) suppository 650 mg  650 mg Rectal Q4H PRN    levETIRAcetam in saline (iso-os) (KEPPRA) infusion 1,000 mg  1,000 mg IntraVENous Q12H       Past Medical History:   Diagnosis Date    CAD (coronary artery disease)     Gout     Hypertension     [facility]ia ([facility]     Seizures ([facility])        Social History     Tobacco Use    Smoking status: Former Smoker    Smokeless tobacco: Never Used   Substance Use Topics    Alcohol use: Not Currently     Frequency: Never       Family history  No [place]'s disorder  Spinocerebellar atrophy     Review of systems  Cannot be obtained patient unruly    Visit Vitals  /85 (BP 1 Location: Left arm, BP Patient Position: At rest)   Pulse 66   Temp 97.3 °F (36.3 °C)   Resp 20   Ht 5' 10\" (1.778 m)   Wt 182 lb 1.6 oz (82.6 kg)   SpO2 99%   BMI 26.13 kg/m²      General: Well developed, well nourished. Extremely agitated   Head: Normocephalic, atraumatic, anicteric sclera   Neck Normal ROM, No thyromegally   Lungs:  Clear to auscultation bilaterally, No wheezes or rubs   Cardiac: Regular rate and rhythm with no murmurs. Abd: Bowel sounds were audible. No tenderness on palpation   Ext: No pedal edema   Skin: Supple no rash     NeurologicExam:  Mental Status: Alert and oriented to person only  Confused   Speech: Fluent no aphasia or dysarthria. Cranial Nerves:  II - XII Intact   Motor:  Full and symmetric strength of upper and lower proximal and distal muscles. Normal bulk and tone. Reflexes:   +1/4 over the proximal tendons of the upper and lower extremities. +0/4 over distal tendons. Sensory:    Grossly intact   Gait:   Deferred   Tremor:   No tremor noted. Cerebellar:  Coordination intact. Neurovascular: No carotid bruits. No JVD           REVIEWED IMAGING:    MRI :  No results found for this or any previous visit. CT:  Results from Hospital Encounter encounter on 11/14/19   CT CODE NEURO HEAD WO CONTRAST    Narrative EXAM: CT CODE NEURO HEAD WO CONTRAST    INDICATION: Code Stroke    COMPARISON: None. CONTRAST: None. TECHNIQUE: Unenhanced CT of the head was performed using 5 mm images. Brain and  bone windows were generated. CT dose reduction was achieved through use of a  standardized protocol tailored for this examination and automatic exposure  control for dose modulation.       FINDINGS:  The ventricles and sulci are normal in size, shape and configuration and  midline. Subcortical and deep white matter hypodensities with a possible lacunar  infarct in the right basal ganglia. ,. There is no intracranial hemorrhage,  extra-axial collection, mass, mass effect or midline shift. The basilar  cisterns are open. No acute infarct is identified. The bone windows demonstrate  no abnormalities. Air-fluid level in the right frontal sinus and partial  opacification of right ethmoid air cells. Impression IMPRESSION: No acute intracranial abnormality.  Possible acute sinusitis  involving the right frontal sinus and ethmoidal air cells           REVIEWED LABS:  Lab Results   Component Value Date/Time    WBC 5.5 11/14/2019 08:15 PM    HCT 34.8 (L) 11/14/2019 08:15 PM    HGB 11.2 (L) 11/14/2019 08:15 PM    PLATELET 690 66/29/0594 08:15 PM     Lab Results   Component Value Date/Time    Sodium 141 11/14/2019 08:15 PM    Potassium 4.3 11/14/2019 08:15 PM    Chloride 106 11/14/2019 08:15 PM    CO2 28 11/14/2019 08:15 PM    Glucose 133 (H) 11/14/2019 08:15 PM    BUN 34 (H) 11/14/2019 08:15 PM    Creatinine 1.52 (H) 11/14/2019 08:15 PM    Calcium 8.8 11/14/2019 08:15 PM     Lab Results   Component Value Date/Time    LDL, calculated 58 11/15/2019 05:19 AM     Lab Results   Component Value Date/Time    Hemoglobin A1c 5.3 11/15/2019 05:19 AM

## 2019-11-15 NOTE — ED NOTES
Updated family that patient will be admitted at this time.  His POA is [place]- [phone]    The facility is    [place] [id number]    Owner is [facility]: [phone]

## 2019-11-15 NOTE — ED NOTES
Patient presents with EMS with a last known well of 30 min PTA. Patient to CT with primary nurse, ems, and Dr. Julia Cross at this time. Patient able to verbalize name only. Per ems patient blood glucose WDL.

## 2019-11-15 NOTE — PROGRESS NOTES
Pt resting with periods of restlessness. IVF's NS@ 100cc/hr infusing via (R) arm 20g. Pt. Tolerating well at this time. Bed at low level, call bell within reach. Pt unable to demonstrate use of the call bell, and unable to answer questions during his admission process d/t either, pt sleepy or decreased cognitive ability. Staff  will do frequent observation rounds to assure pt safety. Incontinent care provided x 3, pt voiding freely, unable to use urinal to obtain urine specimen, may need to be straight cathed. Alarms on and functioning properly. All precautions maintained, will continue to monitor.

## 2019-11-15 NOTE — PROGRESS NOTES
Speech Pathology bedside swallow evaluation/discharge  Patient: Jessica Mantilla (35 y.o. male)  Date: 11/15/2019  Primary Diagnosis: Seizure ([facility] [R56.9]       Precautions:       ASSESSMENT :  Based on the objective data described below, the patient presents with functional swallow of thins, purees and solids. He has poor dentition and masticated solids slowly. Mild swallow delay is suspected. Adequate hyolaryngeal excursion. No coughing after any texture. Would recommend some supervision during meals due to confusion and drowsiness. Skilled acute therapy provided by a speech-language pathologist is not indicated at this time. PLAN :  Recommendations:  Recommend dys 3 /thins  Discharge Recommendations: None     SUBJECTIVE:   Patient stated he didn't know why he was here. OBJECTIVE:     Past Medical History:   Diagnosis Date    CAD (coronary artery disease)     Gout     Hypertension     [facility]     Seizures ([facility]    History reviewed. No pertinent surgical history. Prior Level of Function/Home Situation:  Home Situation  Patient Expects to be Discharged to<[facility]> Private residence  Diet prior to admission:  Current Diet:  NPO  Cognitive and Communication Status:  Neurologic State: Alert, Confused  Orientation Level: Oriented to person, Disoriented to situation, Disoriented to place, Disoriented to time  Cognition: Decreased command following  Perception: Appears intact  Perseveration: No perseveration noted     Oral Assessment:  Oral Assessment  Labial: No impairment  Dentition: Limited;Natural;Poor  Lingual: Decreased rate  Velum: No impairment  Mandible: No impairment  P.O. Trials:  Patient Position: upright in bed  Vocal quality prior to P.O.: No impairment  Consistency Presented: Thin liquid;Puree; Solid  How Presented: Self-fed/presented;Cup/sip;Straw     Bolus Acceptance: No impairment  Bolus Formation/Control: Impaired  Type of Impairment: Delayed;Mastication;Posterior  Propulsion: Delayed (# of seconds)  Oral Residue: None  Initiation of Swallow: Delayed (# of seconds)  Laryngeal Elevation: Functional  Aspiration Signs/Symptoms: None  Pharyngeal Phase Characteristics: No impairment, issues, or problems              Oral Phase Severity: Mild  Pharyngeal Phase Severity : Mild  NOMS:   The NOMS functional outcome measure was used to quantify this patient's level of swallowing impairment. Based on the NOMS, the patient was determined to be at level 5 for swallow function     NOMS Swallowing Levels:  Level 1 (CN): NPO  Level 2 (CM): NPO but takes consistency in therapy  Level 3 (CL): Takes less than 50% of nutrition p.o. and continues with nonoral feedings; and/or safe with mod cues; and/or max diet restriction  Level 4 (CK): Safe swallow but needs mod cues; and/or mod diet restriction; and/or still requires some nonoral feeding/supplements  Level 5 (CJ): Safe swallow with min diet restriction; and/or needs min cues  Level 6 (CI): Independent with p.o.; rare cues; usually self cues; may need to avoid some foods or needs extra time  Level 7 ([address] [place] [address]): Independent for all p.o.  DENI. (2003). National Outcomes Measurement System (NOMS): Adult Speech-Language Pathology User's Guide. Pain:  Pain Scale 1: Numeric (0 - 10)  Pain Intensity 1: 0     After treatment:   [] Patient left in no apparent distress sitting up in chair  [] Patient left in no apparent distress in bed  [] Call bell left within reach  [] Nursing notified  [] Caregiver present  [] Bed alarm activated    COMMUNICATION/EDUCATION:   The patients plan of care including findings, recommendations, and recommended diet changes were discussed with: Registered Nurse. Patient was educated regarding His deficit(s) of dysphagia as this relates to His diagnosis of sz. He demonstrated Fair understanding as evidenced by confusion. [x] Posted safety precautions in patient's room.   [] Patient/family have participated as able and agree with findings and recommendations. [] Patient is unable to participate in plan of care at this time.     Thank you for this referral.  Frances Mina, SLP  Time Calculation: 10 mins

## 2019-11-15 NOTE — PROGRESS NOTES
Hospitalist Progress Note    NAME: Mikayla Manuel   :  1945   MRN:  [id number]       Assessment / Plan:  Pt now with agitation  Will give ativan 1mg im x 1 as no iv and add haldol prn for agitation. Acute encephalopathy likely metabolic due to seizures but will rule out other causes including CVA  -Per family, patient has some underlying baseline confusion  -CT head did not show any acute process  -Ammonia level is within normal limits. TSH levels pending. Recent urine analysis normal.   -Check MRI of the brain.  -Could be related to seizures. Check EEG. Consulted neurology.  -On Depakote at home [address] was added  -On Ativan as needed for seizures. Seizure precautions. Fall precautions.  -Keep n.p.o. until swallow eval    Acute kidney injury likely prerenal  -Baseline creatinine is around 1 and currently creatinine is elevated at 1.52  -Recent UA is within normal limits  -Check CK level  -Start normal hydration with normal saline. Repeat BMP in a.m.  -Hold all nephrotoxic medications including lisinopril. Acute sinusitis  -CT shows evidence of acute sinusitis  -Start Unasyn    Hypertension  -Continue home Norvasc and metoprolol when taking p.o. Gouty arthritis  -Continue allopurinol when taking p.o. History of schizophrenia  -On paliperidone IM q. monthly        Body mass index is 26.13 kg/m². Code status: Full  Prophylaxis: Hep SQ  Recommended Disposition: SNF/LTC     Subjective:     Chief Complaint / Reason for Physician Visit  Alert, awake but confused.   Not able to follow commands      Review of Systems:  Symptom Y/N Comments  Symptom Y/N Comments   Fever/Chills    Chest Pain     Poor Appetite    Edema     Cough    Abdominal Pain     Sputum    Joint Pain     SOB/WILSON    Pruritis/Rash     Nausea/vomit    Tolerating PT/OT     Diarrhea    Tolerating Diet     Constipation    Other       Could NOT obtain due to:      Objective:     VITALS:   Last 24hrs VS reviewed since prior progress note. Most recent are:  Patient Vitals for the past 24 hrs:   Temp Pulse Resp BP SpO2   11/15/19 1202 97.3 °F (36.3 °C) (!) 55 16 139/66 97 %   11/15/19 0724 97.5 °F (36.4 °C) 73 18 153/89 92 %   11/15/19 0400  61      11/15/19 0348 97.2 °F (36.2 °C) 61 18 127/61 96 %   11/15/19 0102 97.3 °F (36.3 °C) 61 20 126/63 96 %   11/15/19 0015 98.7 °F (37.1 °C) (!) 52 18 100/56    11/15/19 0001  (!) 50 13 (!) 78/50 96 %   11/15/19 0000  (!) 52      11/14/19 2345  (!) 53 20 (!) 82/50 97 %   11/14/19 2319   (!) 0 94/58 98 %   11/14/19 2100  70 19 150/81 98 %   11/14/19 2016 98.5 °F (36.9 °C) 73 18 139/90 96 %       Intake/Output Summary (Last 24 hours) at 11/15/2019 1444  Last data filed at 11/15/2019 0724  Gross per 24 hour   Intake 768.33 ml   Output    Net 768.33 ml        PHYSICAL EXAM:  General: Alert, awake, confused  EENT:  Anicteric sclerae. MMM  Resp:  CTA bilaterally, no wheezing or rales. No accessory muscle use  CV:  Regular  rhythm,  No edema  GI:  Soft, Non distended, Non tender.  +Bowel sounds  Neurologic:  Alert and awake, moving all 4 extremities  Psych:   Deferred due to confusion  Skin:  No rashes.   No jaundice    Reviewed most current lab test results and cultures  YES  Reviewed most current radiology test results   YES  Review and summation of old records today    NO  Reviewed patient's current orders and MAR    YES  PMH/SH reviewed - no change compared to H&P          Current Facility-Administered Medications:     ampicillin-sulbactam (UNASYN) 3 g in 0.9% sodium chloride (MBP/ADV) 100 mL, 3 g, IntraVENous, Q8H, Lisa Gage MD    valproate (DEPACON) 125 mg in 0.9% sodium chloride 50 mL IVPB, 125 mg, IntraVENous, Q6H, Koffi Gage MD, Last Rate: 50 mL/hr at 11/15/19 1221, 125 mg at 11/15/19 1221    aspirin tablet 325 mg, 325 mg, Oral, DAILY, Leighann Corral MD, Stopped at 11/15/19 0900    acetaminophen (TYLENOL) tablet 650 mg, 650 mg, Oral, Q4H PRN **OR** acetaminophen (TYLENOL) solution 650 mg, 650 mg, Per NG tube, Q4H PRN **OR** acetaminophen (TYLENOL) suppository 650 mg, 650 mg, Rectal, Q4H PRN, Serene Medina MD    levETIRAcetam in saline (iso-os) (KEPPRA) infusion 1,000 mg, 1,000 mg, IntraVENous, Q12H, Leighann Corral MD, 1,000 mg at 11/15/19 1438  ________________________________________________________________________  Care Plan discussed with:    Comments   Patient y    Family      RN y    Care Manager     Consultant                        Multidiciplinary team rounds were held today with , nursing, pharmacist and clinical coordinator. Patient's plan of care was discussed; medications were reviewed and discharge planning was addressed. ________________________________________________________________________  Total NON critical care TIME:  35    Minutes    Total CRITICAL CARE TIME Spent:   Minutes non procedure based      Comments   >50% of visit spent in counseling and coordination of care     ________________________________________________________________________  Spenser Tavera MD     Procedures: see electronic medical records for all procedures/Xrays and details which were not copied into this note but were reviewed prior to creation of Plan. LABS:  I reviewed today's most current labs and imaging studies.   Pertinent labs include:  Recent Labs     11/14/19 2015   WBC 5.5   HGB 11.2*   HCT 34.8*        Recent Labs     11/14/19 2015      K 4.3      CO2 28   *   BUN 34*   CREA 1.52*   CA 8.8   ALB 2.8*   TBILI 0.4   SGOT 24   ALT 26   INR 1.0       Signed: Spenser Tavera MD

## 2019-11-15 NOTE — H&P
Hospitalist Admission Note    NAME: Cally Hernandez   :  1945   MRN:  [id number]     Date/Time:  2019 11:21 PM    Patient PCP: Mandy Dinh MD  ______________________________________________________________________  Given the patient's current clinical presentation, I have a high level of concern for decompensation if discharged from the emergency department. Complex decision making was performed, which includes reviewing the patient's available past medical records, laboratory results, and x-ray films. My assessment of this patient's clinical condition and my plan of care is as follows. Assessment / Plan:    Acute on chronic encephalopathy   Family reported to the ER that the patient chronically confused   CT head in the emergency department did not reveal any acute changes   Likely post ictal status  EEG  Neuro consult  Ammonia  TSH  Continue home Depakote  Start Keppra 1000 twice daily    ? Acute CVA   Code stroke was called  No focal deficits  Was evaluated by tele-neurologist   Likely it was postictal state   MRI  Neuro consult   Seizure and fall precautions    History of seizure  Chronically on Depakote, will continue   Given his new seizure episode we will add Keppra 1000 twice daily  Neuro eval  Seizure precautions    Acute kidney injury   Likely prerenal from dehydration  Normal saline 100 cc/h  Urine electrolytes  Renal ultrasound    Chronic encephalopathy   History of underlying schizophrenia    Code Status: full  Surrogate Decision Maker: Patient is not able to tell who wants to assign surrogate decision maker at this time and there is no family at bedside.     DVT Prophylaxis: Aspirin 325 mg, patient chronically on it  GI Prophylaxis: not indicated    Baseline: Dependent on help from people, coming from nursing home facility      Subjective:   CHIEF COMPLAINT: Change in mentation    HISTORY OF PRESENT ILLNESS:     The patient is 66years old  man with past medical history of seizure disorder, chronic encephalopathy with underlying schizophrenia presented to emergency department due to change in his mentation as it was reported at the nursing facility. There was no family at bedside or caregiver and all history was obtained from EMR and ER doctor. They report that when the patient got here they called code stroke however he did not have any neurologic deficits and he was evaluated by tele-neurologist which he recommended to admit the patient for inpatient EEG. We were asked to admit for work up and evaluation of the above problems. Past Medical History:   Diagnosis Date    CAD (coronary artery disease)     Gout     Hypertension     [facility]ia ([facility] [address]     Seizures ([facility] [address]         History reviewed. No pertinent surgical history. Social History     Tobacco Use    Smoking status: Former Smoker    Smokeless tobacco: Never Used   Substance Use Topics    Alcohol use: Not Currently     Frequency: Never        History reviewed. No pertinent family history. No Known Allergies     Prior to Admission medications    Medication Sig Start Date End Date Taking? Authorizing Provider   ALLOPURINOL PO Take  by mouth. Yes Fabrizio, MD Mandy   amLODIPine (NORVASC) 10 mg tablet Take 10 mg by mouth daily. Yes Fabrizio, MD Mandy   hydrOXYzine HCl (ATARAX) 10 mg tablet Take 10 mg by mouth two (2) times a day. Yes Mandy Dinh MD   loratadine (CLARITIN) 10 mg tablet Take 10 mg by mouth. Yes Fabrizio, MD Mandy   omega-3s/dha/epa/fish oil/D3 (VITAMIN-D + OMEGA-3 PO) Take  by mouth. Yes Mandy Dinh MD   vitamin B complex (B COMPLEX-VITAMIN B12 PO) Take  by mouth. Yes Fabrizio, MD Mandy   lisinopril (PRINIVIL, ZESTRIL) 40 mg tablet Take 20 mg by mouth daily. Yes Fabrizio, MD Mandy   PALIPERIDONE PALMITATE IM by IntraMUSCular route.  Pt gets this every 4 weeks for schizophrenia   Yes Mandy Dinh MD   divalproex ER (DEPAKOTE ER) 500 mg ER tablet Take 500 mg by mouth. Yes Fabrizio, MD Mandy   metoprolol tartrate (LOPRESSOR) 50 mg tablet Take 50 mg by mouth two (2) times a day. Yes Fabrizio, MD Mandy   aspirin (ASPIRIN) 325 mg tablet Take 325 mg by mouth daily. Yes Fabrizio, MD Mandy       REVIEW OF SYSTEMS:     I am not able to complete the review of systems because: The patient is intubated and sedated    The patient has altered mental status due to his acute medical problems    The patient has baseline aphasia from prior stroke(s)   x The patient has baseline dementia and is not reliable historian    The patient is in acute medical distress and unable to provide information           Total of 12 systems reviewed as follows:       POSITIVE= underlined text  Negative = text not underlined  General:  fever, chills, sweats, generalized weakness, weight loss/gain,      loss of appetite   Eyes:    blurred vision, eye pain, loss of vision, double vision  ENT:    rhinorrhea, pharyngitis   Respiratory:   cough, sputum production, SOB, WILSON, wheezing, pleuritic pain   Cardiology:   chest pain, palpitations, orthopnea, PND, edema, syncope   Gastrointestinal:  abdominal pain , N/V, diarrhea, dysphagia, constipation, bleeding   Genitourinary:  frequency, urgency, dysuria, hematuria, incontinence   Muskuloskeletal :  arthralgia, myalgia, back pain  Hematology:  easy bruising, nose or gum bleeding, lymphadenopathy   Dermatological: rash, ulceration, pruritis, color change / jaundice  Endocrine:   hot flashes or polydipsia   Neurological:  headache, dizziness, confusion, focal weakness, paresthesia,     Speech difficulties, memory loss, gait difficulty  Psychological: Feelings of anxiety, depression, agitation    Objective:   VITALS:    Visit Vitals  /81   Pulse 70   Temp 98.5 °F (36.9 °C)   Resp 19   Ht 5' 10\" (1.778 m)   Wt 82.6 kg (182 lb 1.6 oz)   SpO2 98%   BMI 26.13 kg/m²       PHYSICAL EXAM:    General:    Alert, cooperative, no distress, appears stated age. HEENT: Atraumatic, anicteric sclerae, pink conjunctivae     No oral ulcers, mucosa moist, throat clear, dentition fair  Neck:  Supple, symmetrical,  thyroid: non tender  Lungs:   Clear to auscultation bilaterally. No Wheezing or Rhonchi. No rales. Chest wall:  No tenderness  No Accessory muscle use. Heart:   Regular  rhythm,  No  murmur   No edema  Abdomen:   Soft, non-tender. Not distended. Bowel sounds normal  Extremities: No cyanosis. No clubbing,      Skin turgor normal, Capillary refill normal, Radial dial pulse 2+  Skin:     Not pale. Not Jaundiced  No rashes   Psych:  Good insight. Not depressed. Not anxious or agitated. Neurologic: EOMs intact. No facial asymmetry. No aphasia or slurred speech. Symmetrical strength, Sensation grossly intact. Alert and oriented X 4.     _______________________________________________________________________  Care Plan discussed with:    Comments   Patient x    Family      RN x    Care Manager                    Consultant:      _______________________________________________________________________  Expected  Disposition:   Home with Family    HH/PT/OT/RN    SNF/LTC x   JB    ________________________________________________________________________  TOTAL TIME:  36 Minutes    Critical Care Provided     Minutes non procedure based      Comments     Reviewed previous records   >50% of visit spent in counseling and coordination of care  Discussion with patient and/or family and questions answered       ________________________________________________________________________  Signed: Ron Poole MD    Procedures: see electronic medical records for all procedures/Xrays and details which were not copied into this note but were reviewed prior to creation of Plan.     LAB DATA REVIEWED:    Recent Results (from the past 24 hour(s))   CBC WITH AUTOMATED DIFF    Collection Time: 11/14/19  8:15 PM   Result Value Ref Range    WBC 5.5 4.1 - 11.1 K/uL    RBC 3.65 (L) 4.10 - 5.70 M/uL    HGB 11.2 (L) 12.1 - 17.0 g/dL    HCT 34.8 (L) 36.6 - 50.3 %    MCV 95.3 80.0 - 99.0 FL    MCH 30.7 26.0 - 34.0 PG    MCHC 32.2 30.0 - 36.5 g/dL    RDW 13.5 11.5 - 14.5 %    PLATELET 670 719 - 902 K/uL    MPV 10.6 8.9 - 12.9 FL    NRBC 0.0 0  WBC    ABSOLUTE NRBC 0.00 0.00 - 0.01 K/uL    NEUTROPHILS 78 (H) 32 - 75 %    LYMPHOCYTES 12 12 - 49 %    MONOCYTES 8 5 - 13 %    EOSINOPHILS 2 0 - 7 %    BASOPHILS 0 0 - 1 %    IMMATURE GRANULOCYTES 0 0.0 - 0.5 %    ABS. NEUTROPHILS 4.3 1.8 - 8.0 K/UL    ABS. LYMPHOCYTES 0.7 (L) 0.8 - 3.5 K/UL    ABS. MONOCYTES 0.4 0.0 - 1.0 K/UL    ABS. EOSINOPHILS 0.1 0.0 - 0.4 K/UL    ABS. BASOPHILS 0.0 0.0 - 0.1 K/UL    ABS. IMM. GRANS. 0.0 0.00 - 0.04 K/UL    DF AUTOMATED      RBC COMMENTS NORMOCYTIC, NORMOCHROMIC     METABOLIC PANEL, COMPREHENSIVE    Collection Time: 11/14/19  8:15 PM   Result Value Ref Range    Sodium 141 136 - 145 mmol/L    Potassium 4.3 3.5 - 5.1 mmol/L    Chloride 106 97 - 108 mmol/L    CO2 28 21 - 32 mmol/L    Anion gap 7 5 - 15 mmol/L    Glucose 133 (H) 65 - 100 mg/dL    BUN 34 (H) 6 - 20 MG/DL    Creatinine 1.52 (H) 0.70 - 1.30 MG/DL    BUN/Creatinine ratio 22 (H) 12 - 20      GFR est AA 54 (L) >60 ml/min/1.73m2    GFR est non-AA 45 (L) >60 ml/min/1.73m2    Calcium 8.8 8.5 - 10.1 MG/DL    Bilirubin, total 0.4 0.2 - 1.0 MG/DL    ALT (SGPT) 26 12 - 78 U/L    AST (SGOT) 24 15 - 37 U/L    Alk.  phosphatase 64 45 - 117 U/L    Protein, total 6.4 6.4 - 8.2 g/dL    Albumin 2.8 (L) 3.5 - 5.0 g/dL    Globulin 3.6 2.0 - 4.0 g/dL    A-G Ratio 0.8 (L) 1.1 - 2.2     PROTHROMBIN TIME + INR    Collection Time: 11/14/19  8:15 PM   Result Value Ref Range    INR 1.0 0.9 - 1.1      Prothrombin time 10.5 9.0 - 11.1 sec   POC LACTIC ACID    Collection Time: 11/14/19  8:25 PM   Result Value Ref Range    Lactic Acid (POC) 0.77 0.40 - 2.00 mmol/L   GLUCOSE, POC    Collection Time: 11/14/19  8:26 PM   Result Value Ref Range    Glucose (POC) 160 (H) 65 - 100 mg/dL Performed by Imer Avila PCT    VALPROIC ACID    Collection Time: 11/14/19  8:43 PM   Result Value Ref Range    Valproic acid 76 50 - 100 ug/ml

## 2019-11-15 NOTE — PROGRESS NOTES
Orders received and acknowledged. Chart reviewed and spoke with nursing. Patient currently off the floor for testing. Will defer and follow up for OT evaluation. 10:46 - Second attempted. Pt receiving EEG at bedside.     [facility], OTR/L

## 2019-11-15 NOTE — PROGRESS NOTES
PT note:     Orders received and acknowledged. Chart reviewed and spoke with nursing. Patient currently off the floor for testing. Will defer and follow up for PT evaluation.      John Goins, PT, DPT

## 2019-11-15 NOTE — ROUTINE PROCESS
TRANSFER - OUT REPORT: 
 
Verbal report received from DANNA Flannery on Jori Rollins  being transferred to  Neuro(unit) for routine progression of care Report consisted of patients Situation, Background, Assessment and  
Recommendations(SBAR). Information from the following report(s) SBAR, ED Summary, MAR, Recent Results and Cardiac Rhythm sb was reviewed with the receiving nurse. Lines:  
Peripheral IV 11/15/19 Right Forearm (Active) Site Assessment Clean, dry, & intact 11/15/2019 12:14 AM  
Hub Color/Line Status Infusing 11/15/2019 12:14 AM  
  
 
Opportunity for questions and clarification was provided. Patient transported with: 
 Monitor IV FLUIDS INFUSING UPON TRANSPORT TO FLOOR.

## 2019-11-15 NOTE — PROGRESS NOTES
Problem: Self Care Deficits Care Plan (Adult)  Goal: *Acute Goals and Plan of Care (Insert Text)  Description    FUNCTIONAL STATUS PRIOR TO ADMISSION: Unable to obtain this session as Pt is not reliable historian. Says he uses a cane and RW yet unsure as to accuracy of statement. HOME SUPPORT: Per chart, pt resides at nursing home or group home, yet unclear. Will update as more information is gleaned. Occupational Therapy Goals  Initiated 11/15/2019  1. Patient will perform self-feeding with supervision/set-up within 7 day(s). 2.  Patient will perform grooming tasks sitting unsupported at EOB with supervision/set-up within 7 day(s). 3.  Patient will perform upper body dressing with supervision/set-up within 7 day(s). 4.  Patient will perform lower body dressing with minimal assistance within 7 days. 5.  Patient will perform toilet transfers with CGA using least restrictive device within 7 day(s). 6.  Patient will perform all aspects of toileting with minimal assistance within 7 day(s). Outcome: Progressing Towards Goal   OCCUPATIONAL THERAPY EVALUATION  Patient: Laura Valero (59 y.o. male)  Date: 11/15/2019  Primary Diagnosis: Seizure ([facility] [address] [R56.9]       Precautions:  Fall, Bed Alarm    ASSESSMENT  Based on the objective data described below, the patient presents with lethargy, waxing/waning attention, AMS, agitation, and decreased functional mobility following episode of acute on chronic encephalopathy and SÁNCHEZ. CT head revealed no acute processes. Awaiting brain MRI. Pt with schizophrenia and seizures at baseline. Per chart, shawnjeimy states Pt is typically pleasantly confused and flirtatious with nursing staff at his nursing/group home (unsure as to which it is). Pt was received supine in bed with RN present and stating he needed to urinate. Guided Pt to EOB with Min Assist x1 for task initiation and with moving BLEs to EOB.  Once seated, Pt needed intermittent Min-Mod Assist with urinal management yet did not produce any urine output. He then requested to stand and became agitated as this therapist and rehab tech worked to [id number]. Pts agitation escalated and he became verbally abuse to this therapist. Pt was assisted supine with bed alarm on and RN/PCT present. Anticipate Pt will benefit from SNF rehab at discharge prior to returning to nursing/group home. Will work to clarify PLOF with . Current Level of Function Impacting Discharge (ADLs/self-care): Min-Mod Assist x2 with sit<>stand; Min-Mod Assist with ADLs. Functional Outcome Measure: The patient scored Total: 25/100 on the Barthel Index outcome measure which is indicative of 75% impaired ability to care for basic self needs/dependency on others; inferred 75% dependency on others for instrumental ADLs. Other factors to consider for discharge: Unsure of PLOF; Schizophrenia; Agitation     Patient will benefit from skilled therapy intervention to address the above noted impairments. PLAN :  Recommendations and Planned Interventions: self care training, functional mobility training, therapeutic activities and endurance activities    Frequency/Duration: Patient will be followed by occupational therapy 3 times a week to address goals. Recommendation for discharge: (in order for the patient to meet his/her long term goals)  Therapy up to 5 days/week in SNF setting    This discharge recommendation:  Has not yet been discussed the attending provider and/or case management    IF patient discharges home will need the following DME: to be determined (TBD)       SUBJECTIVE:   Patient stated I need to pee\" and \"LET ME STAND. Geofm Dowse Geofm Dowse If you don't let me stand I'm gonna punch you! \"   \"I don't take no lip from any woman. .. I'm gonna kick you! \" (after politely letting Pt know we need to acquire a RW prior to standing)    OBJECTIVE DATA SUMMARY:   HISTORY:   Past Medical History:   Diagnosis Date    CAD (coronary artery disease)  Gout     Hypertension     Sch[facility]ia ([facility] [address]     Seizures ([facility] [address]    History reviewed. No pertinent surgical history. Expanded or extensive additional review of patient history: Unable to obtain; pt not a reliable historian. Home Situation  Patient Expects to be Discharged to[de-identified] Private residence    Hand dominance: Right    EXAMINATION OF PERFORMANCE DEFICITS:  Cognitive/Behavioral Status:  Neurologic State: Alert;Agitated;Drowsy  Orientation Level: Oriented to person  Cognition: Decreased attention/concentration;Decreased command following; Impaired decision making;Poor safety awareness  Perception: Appears intact  Perseveration: No perseveration noted  Safety/Judgement: Fall prevention;Decreased awareness of environment;Decreased awareness of need for assistance;Decreased awareness of need for safety; Lack of insight into deficits    Skin: Intact    Edema: Mild erythema BUEs    Hearing: Auditory  Auditory Impairment: None    Vision/Perceptual:         Acuity: (unable to formally assess; needs cues for eyes open)         Range of Motion:  Generally decreased yet functional    Strength:  Generally decreased yet functional    Coordination:  Coordination: Generally decreased, functional  Fine Motor Skills-Upper: Left Intact; Right Intact    Gross Motor Skills-Upper: Left Intact; Right Intact    Tone & Sensation:  Tone: Normal  Sensation: (unable to formally assess 2/2 AMS)     Balance:  Sitting: Impaired  Sitting - Static: Good (unsupported)  Sitting - Dynamic: Fair (occasional)    Functional Mobility and Transfers for ADLs:  Bed Mobility:  Rolling: Minimum assistance(HOB elevated)  Supine to Sit: Minimum assistance(verbal cueing to initiate movement)  Sit to Supine: Moderate assistance;Assist x2  Scooting: Minimum assistance    Transfers:  Sit to Stand: Moderate assistance;Minimum assistance;Assist x2; Additional time  Stand to Sit: Moderate assistance;Assist x2; Additional time  Bed to Chair: (not safe to get to chair )  Bathroom Mobility: Minimum assistance; Moderate assistance  Toilet Transfer : Minimum assistance; Moderate assistance;Assist x2  Tub Transfer: Minimum assistance; Moderate assistance;Assist x2    ADL Assessment:  Feeding: Minimum assistance(likely needs assist with container management)    Oral Facial Hygiene/Grooming: Minimum assistance    Bathing: Moderate assistance    Upper Body Dressing: Minimum assistance    Lower Body Dressing: Moderate assistance    Toileting: Moderate assistance                ADL Intervention and task modifications: Toileting  Toileting Assistance: Moderate assistance(sitting unsupported EOB)  Clothing Management: Minimum assistance  Cues: Verbal cues provided; Other (comment)(physical assistance with urinal setup/gown management)  Adaptive Equipment: Other (comment)(urinal)    Cognitive Retraining  Safety/Judgement: Fall prevention;Decreased awareness of environment;Decreased awareness of need for assistance;Decreased awareness of need for safety; Lack of insight into deficits    Functional Measure:  Barthel Index:    Bathin  Bladder: 5  Bowels: 5  Groomin  Dressin  Feedin  Mobility: 0  Stairs: 0  Toilet Use: 5  Transfer (Bed to Chair and Back): 5  Total: 25/100        The Barthel ADL Index: Guidelines  1. The index should be used as a record of what a patient does, not as a record of what a patient could do. 2. The main aim is to establish degree of independence from any help, physical or verbal, however minor and for whatever reason. 3. The need for supervision renders the patient not independent. 4. A patient's performance should be established using the best available evidence. Asking the patient, friends/relatives and nurses are the usual sources, but direct observation and common sense are also important. However direct testing is not needed.   5. Usually the patient's performance over the preceding 24-48 hours is important, but occasionally longer periods will be relevant. 6. Middle categories imply that the patient supplies over 50 per cent of the effort. 7. Use of aids to be independent is allowed. Abe Berumen., Barthel, DSIMONE. (5541). Functional evaluation: the Barthel Index. [facility] (14)2. Nan NavaRADHA Live, Shannan Rankin., Brady Phelps., [place], [facility] (1999). Measuring the change indisability after inpatient rehabilitation; comparison of the responsiveness of the Barthel Index and Functional [place] Measure. Journal of Neurology, Neurosurgery, and Psychiatry, 66(4), 807-755. Artem Yeboah, [place].A, AYSE [place], & Dominic Morton MKhurramA. (2004.) Assessment of post-stroke quality of life in cost-effectiveness studies: The usefulness of the Barthel Index and the EuroQoL-5D. Quality of Life Research, 15, 320-21         Occupational Therapy Evaluation Charge Determination   History Examination Decision-Making   MEDIUM Complexity : Expanded review of history including physical, cognitive and psychosocial  history  MEDIUM Complexity : 3-5 performance deficits relating to physical, cognitive , or psychosocial skils that result in activity limitations and / or participation restrictions MEDIUM Complexity : Patient may present with comorbidities that affect occupational performnce. Miniml to moderate modification of tasks or assistance (eg, physical or verbal ) with assesment(s) is necessary to enable patient to complete evaluation       Based on the above components, the patient evaluation is determined to be of the following complexity level: MEDIUM  Pain Rating:  None    Activity Tolerance:   Fair  Please refer to the flowsheet for vital signs taken during this treatment.     After treatment patient left in no apparent distress:    Supine in bed, Call bell within reach, Bed / chair alarm activated and Side rails x 3    COMMUNICATION/EDUCATION:   The patients plan of care was discussed with: Physical Therapist, Registered Nurse and patient . Home safety education was provided and the patient/caregiver indicated understanding., Patient/family have participated as able in goal setting and plan of care. and Patient/family agree to work toward stated goals and plan of care.     Thank you for this referral.  Terrance Siddiqui, OTR/L  Time Calculation: 13 mins

## 2019-11-15 NOTE — PROGRESS NOTES
CM acknowledged consult for dispo planning; CM attempted to conduct room visit to make contact with pt, introduce role, and complete initial assessment. Pt is currently off the floor. CM will re-attempt later in the day. CM will continue to follow patient for discharge planning needs and arrange for services as deemed necessary.     Josephine Chung MSW  Care Manager, [address] [place] [address]

## 2019-11-15 NOTE — PROGRESS NOTES
Speech path   Pt is currently off the floor and will be evaluated when he is available.    Veena Quinonez, SLP

## 2019-11-15 NOTE — PROGRESS NOTES
Bedside shift change report given to Madison Ellis (oncoming nurse) by Elsa Miller (offgoing nurse). Report included the following information SBAR, Kardex, Procedure Summary, Intake/Output, MAR, Recent Results and Cardiac Rhythm NSR/SB.

## 2019-11-15 NOTE — PROGRESS NOTES
Initial Nutrition Assessment:    INTERVENTIONS/RECOMMENDATIONS:   · Meals/Snacks: General/healthful diet: Continue current diet    ASSESSMENT:   Patient medically noted for seizure, ruling out CVA, acute encephalopathy, and chronic confusion. PMH schizophrenia. Patient has been off the floor at time of attempted visits today. SLP advanced to NDD3. MST received; \"unsure\" of recent weight loss. Patient unlikely to have been able to answer question. No weight history noted on chart review. Will monitor PO and need for ONS. Diet Order: (Dysphagia Advanced Soft)  % Eaten:  No data found. Pertinent Medications: [x]Reviewed []Other: Keppra  Pertinent Labs: [x]Reviewed []Other: -133  Food Allergies: [x]None []Yes:    Last BM:    []Active     []Hyperactive  []Hypoactive       [] Absent BS  Skin:    [x] Intact   [] Incision  [] Breakdown: [] Edema []Other:    Anthropometrics:   Height: 5' 10\" (177.8 cm) Weight: 82.6 kg (182 lb 1.6 oz)   IBW (%IBW):   ( ) UBW (%UBW):   (  %)   Last Weight Metrics:  Weight Loss Metrics 11/14/2019 11/6/2019   Today's Wt 182 lb 1.6 oz 184 lb 11.9 oz   BMI 26.13 kg/m2 26.51 kg/m2       BMI: Body mass index is 26.13 kg/m². This BMI is indicative of:   []Underweight    []Normal    [x]Overweight    [] Obesity   [] Extreme Obesity (BMI>40)     Estimated Nutrition Needs (Based on):   2036 Kcals/day(BMR (1566) x 1. 3AF) , 83 g(1.0 g/kg bw) Protein  Carbohydrate:  At Least 130 g/day  Fluids: 2000 mL/day (1ml/kcal)    Pt expected to meet estimated nutrient needs: [x]Yes []No    NUTRITION DIAGNOSES:   Problem:  No nutritional diagnosis at this time      Etiology: related to       Signs/Symptoms: as evidenced by        NUTRITION INTERVENTIONS:  Meals/Snacks: General/healthful diet                  GOAL:   PO intake >70% of meals next 3-5 days    LEARNING NEEDS (Diet, Food/Nutrient-Drug Interaction):    [x] None Identified   [] Identified and Education Provided/Documented   [] Identified and Pt declined/was not appropriate     Cultural, Church, OR Ethnic Dietary Needs:    [x] None Identified   [] Identified and Addressed     [x] Interdisciplinary Care Plan Reviewed/Documented    [x] Discharge Planning: NDD3       MONITORING /EVALUATION:   Food/Nutrient Intake Outcomes:  Total energy intake  Physical Signs/Symptoms Outcomes: Weight/weight change    NUTRITION RISK:    [x] Patient At Nutritional Risk              [] Patient Not at Nutritional Risk    PT SEEN FOR:    []  MD Consult: []Calorie Count      []Diabetic Diet Education        []Diet Education     []Electrolyte Management     []General Nutrition Management and Supplements     []Management of Tube Feeding     []TPN Recommendations    [x]  RN Referral:  [x]MST score >=2     []Enteral/Parenteral Nutrition PTA     []Pregnant: Gestational DM or Multigestation     []Pressure Ulcer/Wound Care needs        []  Low BMI  []  JACKIE Fuchs [phone]  Pager [phone]    Weekend Pager [phone]

## 2019-11-16 ENCOUNTER — APPOINTMENT (OUTPATIENT)
Dept: MRI IMAGING | Age: 74
DRG: 100 | End: 2019-11-16
Attending: INTERNAL MEDICINE
Payer: MEDICARE

## 2019-11-16 LAB
ANION GAP SERPL CALC-SCNC: 8 MMOL/L (ref 5–15)
BUN SERPL-MCNC: 19 MG/DL (ref 6–20)
BUN/CREAT SERPL: 17 (ref 12–20)
CALCIUM SERPL-MCNC: 8.6 MG/DL (ref 8.5–10.1)
CHLORIDE SERPL-SCNC: 106 MMOL/L (ref 97–108)
CO2 SERPL-SCNC: 26 MMOL/L (ref 21–32)
CREAT SERPL-MCNC: 1.11 MG/DL (ref 0.7–1.3)
ERYTHROCYTE [DISTWIDTH] IN BLOOD BY AUTOMATED COUNT: 13.5 % (ref 11.5–14.5)
GLUCOSE SERPL-MCNC: 94 MG/DL (ref 65–100)
HCT VFR BLD AUTO: 37.5 % (ref 36.6–50.3)
HGB BLD-MCNC: 12.3 G/DL (ref 12.1–17)
MCH RBC QN AUTO: 30.8 PG (ref 26–34)
MCHC RBC AUTO-ENTMCNC: 32.8 G/DL (ref 30–36.5)
MCV RBC AUTO: 94 FL (ref 80–99)
NRBC # BLD: 0 K/UL (ref 0–0.01)
NRBC BLD-RTO: 0 PER 100 WBC
PLATELET # BLD AUTO: 183 K/UL (ref 150–400)
PMV BLD AUTO: 10.5 FL (ref 8.9–12.9)
POTASSIUM SERPL-SCNC: 3.6 MMOL/L (ref 3.5–5.1)
PROCALCITONIN SERPL-MCNC: <0.1 NG/ML
RBC # BLD AUTO: 3.99 M/UL (ref 4.1–5.7)
SODIUM SERPL-SCNC: 140 MMOL/L (ref 136–145)
TROPONIN I SERPL-MCNC: <0.05 NG/ML
WBC # BLD AUTO: 6.8 K/UL (ref 4.1–11.1)

## 2019-11-16 PROCEDURE — C9254 INJECTION, LACOSAMIDE: HCPCS | Performed by: PSYCHIATRY & NEUROLOGY

## 2019-11-16 PROCEDURE — 74011000258 HC RX REV CODE- 258: Performed by: PSYCHIATRY & NEUROLOGY

## 2019-11-16 PROCEDURE — 74011000250 HC RX REV CODE- 250: Performed by: INTERNAL MEDICINE

## 2019-11-16 PROCEDURE — 74011250636 HC RX REV CODE- 250/636: Performed by: INTERNAL MEDICINE

## 2019-11-16 PROCEDURE — 85027 COMPLETE CBC AUTOMATED: CPT

## 2019-11-16 PROCEDURE — 84484 ASSAY OF TROPONIN QUANT: CPT

## 2019-11-16 PROCEDURE — 84145 PROCALCITONIN (PCT): CPT

## 2019-11-16 PROCEDURE — 80048 BASIC METABOLIC PNL TOTAL CA: CPT

## 2019-11-16 PROCEDURE — 65660000000 HC RM CCU STEPDOWN

## 2019-11-16 PROCEDURE — 74011000258 HC RX REV CODE- 258: Performed by: INTERNAL MEDICINE

## 2019-11-16 PROCEDURE — 74011250637 HC RX REV CODE- 250/637: Performed by: INTERNAL MEDICINE

## 2019-11-16 PROCEDURE — 36415 COLL VENOUS BLD VENIPUNCTURE: CPT

## 2019-11-16 PROCEDURE — 94760 N-INVAS EAR/PLS OXIMETRY 1: CPT

## 2019-11-16 PROCEDURE — 70551 MRI BRAIN STEM W/O DYE: CPT

## 2019-11-16 PROCEDURE — 74011250636 HC RX REV CODE- 250/636: Performed by: PSYCHIATRY & NEUROLOGY

## 2019-11-16 RX ORDER — AMLODIPINE BESYLATE 5 MG/1
10 TABLET ORAL DAILY
Status: DISCONTINUED | OUTPATIENT
Start: 2019-11-17 | End: 2019-11-22

## 2019-11-16 RX ORDER — SODIUM CHLORIDE 0.9 % (FLUSH) 0.9 %
SYRINGE (ML) INJECTION
Status: DISPENSED
Start: 2019-11-16 | End: 2019-11-16

## 2019-11-16 RX ORDER — METOPROLOL TARTRATE 50 MG/1
50 TABLET ORAL 2 TIMES DAILY
Status: DISCONTINUED | OUTPATIENT
Start: 2019-11-17 | End: 2019-11-22

## 2019-11-16 RX ORDER — AMOXICILLIN AND CLAVULANATE POTASSIUM 875; 125 MG/1; MG/1
1 TABLET, FILM COATED ORAL EVERY 12 HOURS
Status: DISPENSED | OUTPATIENT
Start: 2019-11-16 | End: 2019-11-19

## 2019-11-16 RX ORDER — SODIUM CHLORIDE 9 MG/ML
50 INJECTION, SOLUTION INTRAVENOUS CONTINUOUS
Status: DISCONTINUED | OUTPATIENT
Start: 2019-11-16 | End: 2019-11-16

## 2019-11-16 RX ADMIN — ASPIRIN 325 MG: 325 TABLET, FILM COATED ORAL at 11:03

## 2019-11-16 RX ADMIN — SODIUM CHLORIDE 100 MG: 900 INJECTION, SOLUTION INTRAVENOUS at 09:21

## 2019-11-16 RX ADMIN — SODIUM CHLORIDE 100 MG: 900 INJECTION, SOLUTION INTRAVENOUS at 22:45

## 2019-11-16 RX ADMIN — AMPICILLIN AND SULBACTAM 3 G: 2; 1 INJECTION, POWDER, FOR SOLUTION INTRAMUSCULAR; INTRAVENOUS at 05:36

## 2019-11-16 RX ADMIN — AMPICILLIN AND SULBACTAM 3 G: 2; 1 INJECTION, POWDER, FOR SOLUTION INTRAMUSCULAR; INTRAVENOUS at 13:25

## 2019-11-16 RX ADMIN — VALPROATE SODIUM 125 MG: 100 INJECTION, SOLUTION INTRAVENOUS at 01:26

## 2019-11-16 RX ADMIN — VALPROATE SODIUM 125 MG: 100 INJECTION, SOLUTION INTRAVENOUS at 06:50

## 2019-11-16 RX ADMIN — ALLOPURINOL 100 MG: 100 TABLET ORAL at 11:04

## 2019-11-16 RX ADMIN — VALPROATE SODIUM 125 MG: 100 INJECTION, SOLUTION INTRAVENOUS at 17:53

## 2019-11-16 RX ADMIN — VALPROATE SODIUM 125 MG: 100 INJECTION, SOLUTION INTRAVENOUS at 11:03

## 2019-11-16 RX ADMIN — AMPICILLIN AND SULBACTAM 3 G: 2; 1 INJECTION, POWDER, FOR SOLUTION INTRAMUSCULAR; INTRAVENOUS at 20:11

## 2019-11-16 NOTE — PROGRESS NOTES
* No surgery found *  * No surgery found *  Bedside and Verbal shift change report given to Heri Chavez (oncoming nurse) by Aayush Nicole (offgoing nurse). Report included the following information SBAR and Kardex. Zone Phone:   [phone]      Significant changes during shift:  Restraints, removed for cares, observation and skin checks, off at end of shift        Patient Information    Ronel Goodman  66 y.o.  11/14/2019  7:59 PM by Ron Poole MD. Ronel Goodman was admitted from Home    Problem List    Patient Active Problem List    Diagnosis Date Noted    Seizure [facility]) 11/14/2019     Past Medical History:   Diagnosis Date    CAD (coronary artery disease)     Gout     Hypertension     [facility]ia [facility] [address]     Seizures ([facility] [address]          Core Measures:    CVA: Yes Yes  CHF:No No  PNA:No No    Post Op Surgical (If Applicable):     Number times ambulated in hallway past shift:  0  Number of times OOB to chair past shift:   0  NG Tube: No  Incentive Spirometer: Yes  Drains: No   Volume    Dressing Present:  No  Flatus:  Yes    Activity Status:    OOB to Chair No  Ambulated this shift No   Bed Rest Yes    Supplemental O2: (If Applicable)    NC No  NRB No  Venti-mask No  On 0 Liters/min      LINES AND DRAINS:    Central Line? No Placement date  Reason Medically Necessary       PICC LINE? No Placement date Reason Medically Necessary     Urinary Catheter? No Placement Date  Reason Medically Necessary     DVT prophylaxis:    DVT prophylaxis Med- Yes  DVT prophylaxis SCD or SCAR- Yes     Wounds: (If Applicable)  Location     Patient Safety:    Falls Score Total Score: 5  Safety Level_______  Bed Alarm On? Yes  Sitter?  Yes    Plan for upcoming shift:  Monitor for restraint removal        Discharge Plan: Yes     Active Consults:  IP CONSULT TO HOSPITALIST  IP CONSULT TO NEUROLOGY

## 2019-11-16 NOTE — PROGRESS NOTES
Pt alert and verbally responsive, remains in restraints, sitter at bedside, skin checks and monitoring  done Q30 min to assure pt safety, comfort and well-being. Meds given as ordered, antibiotics in progress with no s/s of adverse reactions noted. Bed at low level, call bell within reach, alarm on and functioning properly.

## 2019-11-16 NOTE — PROCEDURES
Yuriy Wade   [place], [address]      Electroencephalogram    Procedure ID: [id number] Procedure Date: 11/15/2019   Patient Name: Jessica Mantilla YOB: 1945   Procedure Type: Routine Medical Record No: [id number]     INDICATION: Seizure    Medications:  Current Facility-Administered Medications   Medication Dose Route Frequency    ampicillin-sulbactam (UNASYN) 3 g in 0.9% sodium chloride (MBP/ADV) 100 mL  3 g IntraVENous Q8H    valproate (DEPACON) 125 mg in 0.9% sodium chloride 50 mL IVPB  125 mg IntraVENous Q6H    0.9% sodium chloride infusion  100 mL/hr IntraVENous CONTINUOUS    [START ON 11/16/2019] allopurinol (ZYLOPRIM) tablet 100 mg  100 mg Oral DAILY    haloperidol lactate (HALDOL) injection 2 mg  2 mg IntraVENous Q4H PRN    lacosamide (VIMPAT) injection 100 mg  100 mg IntraVENous Q12H    aspirin tablet 325 mg  325 mg Oral DAILY    acetaminophen (TYLENOL) tablet 650 mg  650 mg Oral Q4H PRN    Or    acetaminophen (TYLENOL) solution 650 mg  650 mg Per NG tube Q4H PRN    Or    acetaminophen (TYLENOL) suppository 650 mg  650 mg Rectal Q4H PRN       DESCRIPTION OF PROCEDURE: Electrodes were applied in accordance with the international 10-20 system of electrode placement. EEG was reviewed in both bipolar and referential montages    DESCRIPTION OF FINDINGS: Background consists of symmetric 6 Hz synchronous activity that attenuates as the patient enters sleep. No sleep spindles were noted. Symmetric occipital driving was noted with photic stimulation. No seizures or interictal hallmarks were identified on this study. IMPRESSION:  Normal sleep and drowsy. Absence of seizures on this study does not exclude epilepsy. Clinical correlation is advised.
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
## 2019-11-16 NOTE — PROGRESS NOTES
Problem: Seizure Disorder (Adult)  Goal: *STG: Remains free of seizure activity  Outcome: Progressing Towards Goal  Goal: *STG: Maintains lab values within therapeutic range  Outcome: Progressing Towards Goal  Goal: *STG/LTG: Complies with medication therapy  Outcome: Progressing Towards Goal  Goal: *STG: Remains free of injury during seizure activity  Outcome: Progressing Towards Goal  Goal: *STG: Remains safe in hospital  Outcome: Progressing Towards Goal  Goal: Interventions  Outcome: Progressing Towards Goal     Problem: Patient Education: Go to Patient Education Activity  Goal: Patient/Family Education  Outcome: Progressing Towards Goal     Problem: Pressure Injury - Risk of  Goal: *Prevention of pressure injury  Description  Document Sandeep Scale and appropriate interventions in the flowsheet.   Outcome: Progressing Towards Goal  Note:   Pressure Injury Interventions:  Sensory Interventions: Assess changes in LOC, Check visual cues for pain, Float heels, Maintain/enhance activity level    Moisture Interventions: Absorbent underpads, Limit adult briefs, Assess need for specialty bed    Activity Interventions: Assess need for specialty bed    Mobility Interventions: Assess need for specialty bed    Nutrition Interventions: Document food/fluid/supplement intake, Offer support with meals,snacks and hydration    Friction and Shear Interventions: Apply protective barrier, creams and emollients, Lift sheet, Lift team/patient mobility team

## 2019-11-16 NOTE — PROGRESS NOTES
Daughter requesting me to initiate the transfer to [place] due to his hx of schizophrenia  I will initiate the transfer and wait for acceptance.

## 2019-11-17 LAB
ANION GAP SERPL CALC-SCNC: 8 MMOL/L (ref 5–15)
BUN SERPL-MCNC: 20 MG/DL (ref 6–20)
BUN/CREAT SERPL: 21 (ref 12–20)
CALCIUM SERPL-MCNC: 8.6 MG/DL (ref 8.5–10.1)
CHLORIDE SERPL-SCNC: 108 MMOL/L (ref 97–108)
CK SERPL-CCNC: 380 U/L (ref 39–308)
CO2 SERPL-SCNC: 24 MMOL/L (ref 21–32)
CREAT SERPL-MCNC: 0.94 MG/DL (ref 0.7–1.3)
GLUCOSE SERPL-MCNC: 77 MG/DL (ref 65–100)
POTASSIUM SERPL-SCNC: 3.5 MMOL/L (ref 3.5–5.1)
SODIUM SERPL-SCNC: 140 MMOL/L (ref 136–145)

## 2019-11-17 PROCEDURE — 74011250637 HC RX REV CODE- 250/637: Performed by: INTERNAL MEDICINE

## 2019-11-17 PROCEDURE — 74011000258 HC RX REV CODE- 258: Performed by: INTERNAL MEDICINE

## 2019-11-17 PROCEDURE — 65660000000 HC RM CCU STEPDOWN

## 2019-11-17 PROCEDURE — C9254 INJECTION, LACOSAMIDE: HCPCS | Performed by: PSYCHIATRY & NEUROLOGY

## 2019-11-17 PROCEDURE — 82550 ASSAY OF CK (CPK): CPT

## 2019-11-17 PROCEDURE — 74011250636 HC RX REV CODE- 250/636: Performed by: PSYCHIATRY & NEUROLOGY

## 2019-11-17 PROCEDURE — 74011000250 HC RX REV CODE- 250: Performed by: INTERNAL MEDICINE

## 2019-11-17 PROCEDURE — 80048 BASIC METABOLIC PNL TOTAL CA: CPT

## 2019-11-17 PROCEDURE — 74011000258 HC RX REV CODE- 258: Performed by: PSYCHIATRY & NEUROLOGY

## 2019-11-17 PROCEDURE — 36415 COLL VENOUS BLD VENIPUNCTURE: CPT

## 2019-11-17 RX ORDER — LACOSAMIDE 100 MG/1
100 TABLET ORAL EVERY 12 HOURS
Status: DISCONTINUED | OUTPATIENT
Start: 2019-11-17 | End: 2019-12-03 | Stop reason: HOSPADM

## 2019-11-17 RX ORDER — DIVALPROEX SODIUM 250 MG/1
500 TABLET, EXTENDED RELEASE ORAL DAILY
Status: DISCONTINUED | OUTPATIENT
Start: 2019-11-17 | End: 2019-12-03 | Stop reason: HOSPADM

## 2019-11-17 RX ORDER — HEPARIN SODIUM 5000 [USP'U]/ML
5000 INJECTION, SOLUTION INTRAVENOUS; SUBCUTANEOUS EVERY 8 HOURS
Status: DISCONTINUED | OUTPATIENT
Start: 2019-11-17 | End: 2019-12-03 | Stop reason: HOSPADM

## 2019-11-17 RX ADMIN — METOPROLOL TARTRATE 50 MG: 50 TABLET, FILM COATED ORAL at 17:57

## 2019-11-17 RX ADMIN — VALPROATE SODIUM 125 MG: 100 INJECTION, SOLUTION INTRAVENOUS at 06:27

## 2019-11-17 RX ADMIN — AMLODIPINE BESYLATE 10 MG: 5 TABLET ORAL at 09:16

## 2019-11-17 RX ADMIN — SODIUM CHLORIDE 100 MG: 900 INJECTION, SOLUTION INTRAVENOUS at 09:23

## 2019-11-17 RX ADMIN — ASPIRIN 325 MG: 325 TABLET, FILM COATED ORAL at 09:16

## 2019-11-17 RX ADMIN — AMOXICILLIN AND CLAVULANATE POTASSIUM 1 TABLET: 875; 125 TABLET, FILM COATED ORAL at 21:46

## 2019-11-17 RX ADMIN — DIVALPROEX SODIUM 500 MG: 250 TABLET, EXTENDED RELEASE ORAL at 13:23

## 2019-11-17 RX ADMIN — AMOXICILLIN AND CLAVULANATE POTASSIUM 1 TABLET: 875; 125 TABLET, FILM COATED ORAL at 12:18

## 2019-11-17 RX ADMIN — VALPROATE SODIUM 125 MG: 100 INJECTION, SOLUTION INTRAVENOUS at 00:35

## 2019-11-17 RX ADMIN — METOPROLOL TARTRATE 50 MG: 50 TABLET, FILM COATED ORAL at 09:16

## 2019-11-17 RX ADMIN — LACOSAMIDE 100 MG: 100 TABLET, FILM COATED ORAL at 21:40

## 2019-11-17 RX ADMIN — ALLOPURINOL 100 MG: 100 TABLET ORAL at 09:16

## 2019-11-17 NOTE — PROGRESS NOTES
Reason for Admission: Seizure                     RRAT Score: 5                    Plan for utilizing home health: PT/OT recommendations for SNF at [facility]. Pt only has Medicare Part A, therefore would have 20-day SNF benefit but have copayments for any MD visits at facility. Pt is a , pending his Service-Connected Disability Rating - may qualify for Contract NH placement for rehab. Current Advanced Directive/Advance Care Plan: Full Code - ACP not on file at this time                         Transition of Care Plan: Transfer to [facility] (niece/mPOA's preference)    65 yo  male admitted on 11/14/19 for Seizure. Lives in a [facility] (see address below). Per Sixto (facility owner), when pt first started living with her he was pretty high functioning but since his first seizure has declined. However now, he's Min A with most ADL's often d/t confusion and need for cueing. Pt found at kitchen table unconscious when he had seizure. Sixto reported that she's concerned about ensuring there is a good treatment plan in place because they often don't know or can see signs of a seizure coming and are concerned for pt's safety. Pt's NOK/mPOA is his niece (JERAD PRICE [address], [phone]). HERLINDA notified by MD of [place] Transfer Request from [facility]. Transfer Request form completed and faxed with clinicals to [facility], [phone]). Per MD, stated he would speak with pt's niece/mPOA as pt has been improving and may be stable for discharge tomorrow. [facility] Transfer likely would not occur today. HERLINDA called [address] who stated they would start processing request. Will need to verify information but believe that pt is 70% [place] (if this is true, eligible for [facility], [facility] contact & [facility] Travel). PT/OT recommendations for SNF at this time as pt is Min-Mod A x2 for mobility, ADLs at this time.  Pt only has Medicare Part A, therefore would have copayment for physician visits at a SNF. Depending on pt's Service-Connection Disability Rating at [facility], may qualify for Contract NH placement. [facility] Address:  [address] [place] [address]  [facility] Josie Butler ([phone])    Care Management Interventions  PCP Verified by CM: Yes([facility])  Palliative Care Criteria Met (RRAT>21 & CHF Dx)?: No  Mode of Transport at Discharge: [facility] [place]  Transition of Care Consult (CM Consult): Other([facility] Transfer)  Discharge Durable Medical Equipment: No  Health Maintenance Reviewed: Yes  Physical Therapy Consult: Yes  Occupational Therapy Consult: Yes  Speech Therapy Consult: Yes  Current Support Network:  Other(Lives at [facility])  Confirm Follow Up Transport: Other (see comment)([facility] Travel)  Plan discussed with Pt/Family/Caregiver: Yes  Discharge Location  Discharge Placement: [id number], MSW Supervisee in Social Work, [address] [place] [address]  [phone]

## 2019-11-17 NOTE — PROGRESS NOTES
Hospitalist Progress Note    NAME: Laura Valero   :  1945   MRN:  [id number]       Assessment / Plan:  Acute encephalopathy likely metabolic due to seizures and delirium  -Per family, patient has some underlying baseline confusion  -CT head did not show any acute process  -Ammonia level is within normal limits. Recent urine analysis normal.   -MRI of the brain is normal   -cont depakote and vimpat was added  -On Ativan as needed for seizures. Seizure precautions. Fall precautions.  -cont dysphagia diet  -Initiate transfer to [place] per pt's necie request    Acute kidney injury likely prerenal  -Baseline creatinine is around 1 and currently creatinine is elevated at 1.52  -Recent UA is within normal limits  -cr is now normal    Acute sinusitis  -CT shows evidence of acute sinusitis  -change to Augmentin in am if mental status is better    Hypertension  -Continue home Norvasc and metoprolol. Hold lisinopril    Gouty arthritis  -Continue allopurinol when taking p.o. History of schizophrenia  -On paliperidone IM q. Monthly  -now on haldol prn for agitation  -DC restraints if agitation is improving asap        Body mass index is 26.13 kg/m². Code status: Full  Prophylaxis: Hep SQ  Recommended Disposition: SNF/LTC     Subjective:     Chief Complaint / Reason for Physician Visit  Alert, awake but confused. Not able to follow commands      Review of Systems:  Symptom Y/N Comments  Symptom Y/N Comments   Fever/Chills    Chest Pain     Poor Appetite    Edema     Cough    Abdominal Pain     Sputum    Joint Pain     SOB/WILSON    Pruritis/Rash     Nausea/vomit    Tolerating PT/OT     Diarrhea    Tolerating Diet     Constipation    Other       Could NOT obtain due to:      Objective:     VITALS:   Last 24hrs VS reviewed since prior progress note.  Most recent are:  Patient Vitals for the past 24 hrs:   Temp Pulse Resp BP SpO2   19 1915 98.1 °F (36.7 °C) 97 18 (!) 172/98 92 %   19 1756 97.5 °F (36.4 °C) 74 18 161/84 97 %   11/16/19 1205 97.8 °F (36.6 °C) 73 18 157/84 97 %   11/16/19 1055 98.1 °F (36.7 °C) 67 18 133/80 97 %   11/16/19 0719 97.9 °F (36.6 °C) 73 18 166/82 94 %   11/16/19 0340 97.5 °F (36.4 °C) 71 18 143/84 97 %   11/16/19 0032 97.8 °F (36.6 °C) 74 18 (!) 139/7 95 %   11/16/19 0000  74        No intake or output data in the 24 hours ending 11/16/19 2235     PHYSICAL EXAM:  General: Alert, awake, confused  EENT:  Anicteric sclerae. MMM  Resp:  CTA bilaterally, no wheezing or rales. No accessory muscle use  CV:  Regular  rhythm,  No edema  GI:  Soft, Non distended, Non tender.  +Bowel sounds  Neurologic:  Alert and awake, moving all 4 extremities  Psych:   Deferred due to confusion  Skin:  No rashes.   No jaundice    Reviewed most current lab test results and cultures  YES  Reviewed most current radiology test results   YES  Review and summation of old records today    NO  Reviewed patient's current orders and MAR    YES  PMH/SH reviewed - no change compared to H&P          Current Facility-Administered Medications:     sodium chloride (NS) flush, , , ,     ampicillin-sulbactam (UNASYN) 3 g in 0.9% sodium chloride (MBP/ADV) 100 mL, 3 g, IntraVENous, Q8H, Koffi Gage MD, Last Rate: 100 mL/hr at 11/16/19 2011, 3 g at 11/16/19 2011    valproate (DEPACON) 125 mg in 0.9% sodium chloride 50 mL IVPB, 125 mg, IntraVENous, Q6H, Koffi Gage MD, Last Rate: 50 mL/hr at 11/16/19 1753, 125 mg at 11/16/19 1753    allopurinol (ZYLOPRIM) tablet 100 mg, 100 mg, Oral, DAILY, Koffi Cole MD, 100 mg at 11/16/19 1104    haloperidol lactate (HALDOL) injection 2 mg, 2 mg, IntraVENous, Q4H PRN, Yared Cole MD    lacosamide (VIMPAT) 100 mg in 0.9% sodium chloride 100 mL IVPB, 100 mg, IntraVENous, Q12H, Abdulaziz Rai MD, 100 mg at 11/16/19 8168    aspirin tablet 325 mg, 325 mg, Oral, DAILY, Leighann Corral MD, 325 mg at 11/16/19 1103    acetaminophen (TYLENOL) tablet 650 mg, 650 mg, Oral, Q4H PRN **OR** acetaminophen (TYLENOL) solution 650 mg, 650 mg, Per NG tube, Q4H PRN **OR** acetaminophen (TYLENOL) suppository 650 mg, 650 mg, Rectal, Q4H PRN, Mckenzie Ireland MD  ________________________________________________________________________  Care Plan discussed with:    Comments   Patient y    Family      RN y    Care Manager     Consultant                        Multidiciplinary team rounds were held today with , nursing, pharmacist and clinical coordinator. Patient's plan of care was discussed; medications were reviewed and discharge planning was addressed. ________________________________________________________________________  Total NON critical care TIME:  35    Minutes    Total CRITICAL CARE TIME Spent:   Minutes non procedure based      Comments   >50% of visit spent in counseling and coordination of care     ________________________________________________________________________  Maren Frankel, MD     Procedures: see electronic medical records for all procedures/Xrays and details which were not copied into this note but were reviewed prior to creation of Plan. LABS:  I reviewed today's most current labs and imaging studies.   Pertinent labs include:  Recent Labs     11/16/19 0411 11/14/19 2015   WBC 6.8 5.5   HGB 12.3 11.2*   HCT 37.5 34.8*    178     Recent Labs     11/16/19 0411 11/14/19 2015    141   K 3.6 4.3    106   CO2 26 28   GLU 94 133*   BUN 19 34*   CREA 1.11 1.52*   CA 8.6 8.8   ALB  --  2.8*   TBILI  --  0.4   SGOT  --  24   ALT  --  26   INR  --  1.0       Signed: Maren Frankel, MD

## 2019-11-17 NOTE — PROGRESS NOTES
Patient was asleep when breakfast tray arrived at [address] [place] [address]. I tried to wake him up to see if he would eat but didn't wake up; he just continued sleeping. Will try again in a few minutes.

## 2019-11-18 PROCEDURE — 65660000000 HC RM CCU STEPDOWN

## 2019-11-18 PROCEDURE — 74011250637 HC RX REV CODE- 250/637: Performed by: INTERNAL MEDICINE

## 2019-11-18 PROCEDURE — 74011250636 HC RX REV CODE- 250/636: Performed by: INTERNAL MEDICINE

## 2019-11-18 RX ADMIN — DIVALPROEX SODIUM 500 MG: 250 TABLET, EXTENDED RELEASE ORAL at 09:52

## 2019-11-18 RX ADMIN — ALLOPURINOL 100 MG: 100 TABLET ORAL at 09:53

## 2019-11-18 RX ADMIN — HALOPERIDOL LACTATE 2 MG: 5 INJECTION, SOLUTION INTRAMUSCULAR at 22:13

## 2019-11-18 RX ADMIN — LACOSAMIDE 100 MG: 100 TABLET, FILM COATED ORAL at 09:53

## 2019-11-18 RX ADMIN — AMLODIPINE BESYLATE 10 MG: 5 TABLET ORAL at 09:53

## 2019-11-18 RX ADMIN — AMOXICILLIN AND CLAVULANATE POTASSIUM 1 TABLET: 875; 125 TABLET, FILM COATED ORAL at 09:53

## 2019-11-18 RX ADMIN — LACOSAMIDE 100 MG: 100 TABLET, FILM COATED ORAL at 22:13

## 2019-11-18 RX ADMIN — HEPARIN SODIUM 5000 UNITS: 5000 INJECTION INTRAVENOUS; SUBCUTANEOUS at 15:03

## 2019-11-18 RX ADMIN — HEPARIN SODIUM 5000 UNITS: 5000 INJECTION INTRAVENOUS; SUBCUTANEOUS at 22:13

## 2019-11-18 RX ADMIN — ASPIRIN 325 MG: 325 TABLET, FILM COATED ORAL at 09:52

## 2019-11-18 RX ADMIN — METOPROLOL TARTRATE 50 MG: 50 TABLET, FILM COATED ORAL at 09:53

## 2019-11-18 RX ADMIN — AMOXICILLIN AND CLAVULANATE POTASSIUM 1 TABLET: 875; 125 TABLET, FILM COATED ORAL at 22:13

## 2019-11-18 RX ADMIN — METOPROLOL TARTRATE 50 MG: 50 TABLET, FILM COATED ORAL at 22:13

## 2019-11-18 NOTE — PROGRESS NOTES
Hospitalist Progress Note    NAME: Kadie Pimentel   :  1945   MRN:  [id number]       Assessment / Plan:  Acute encephalopathy likely metabolic due to seizures and delirium  -Per family, patient has some underlying baseline confusion  -CT head did not show any acute process  -Ammonia level is within normal limits. Recent urine analysis normal.   -MRI of the brain is normal   -cont depakote and vimpat was added and changed to p.o. as patient is now eating  -On Ativan as needed for seizures. Seizure precautions. Fall precautions.  -cont dysphagia diet  -Spoke to physician at the [place] and requested for transfer. [facility] transfer [facility] informed me to initiate paperwork and I informed case management and the necessary papers were signed  -I updated patient's niece over the phone and requested her to sign the documents    Acute kidney injury likely prerenal  -Creatinine improved with hydration and is normal now    Acute sinusitis  -CT shows evidence of acute sinusitis  -Continue Augmentin    Hypertension  -Continue home Norvasc and metoprolol. Hold lisinopril    Gouty arthritis  -Continue allopurinol     History of schizophrenia  -On paliperidone IM q. Monthly  -now on haldol prn for agitation  -DC restraints     Disposition:  Family wanted the patient to be transferred to [place] for mental health evaluation and also to see if his mental health medications are precipitating seizures    Body mass index is 26.13 kg/m².     Code status: Full  Prophylaxis: Hep SQ  Recommended Disposition: SNF/LTC     Subjective:     Chief Complaint / Reason for Physician Visit  Alert, awake and pleasant today  Was less agitated last night  No other acute events overnight      Review of Systems:  Symptom Y/N Comments  Symptom Y/N Comments   Fever/Chills    Chest Pain     Poor Appetite    Edema     Cough    Abdominal Pain     Sputum    Joint Pain     SOB/WILSON    Pruritis/Rash     Nausea/vomit    Tolerating PT/OT     Diarrhea    Tolerating Diet Constipation    Other       Could NOT obtain due to:      Objective:     VITALS:   Last 24hrs VS reviewed since prior progress note. Most recent are:  Patient Vitals for the past 24 hrs:   Temp Pulse Resp BP SpO2   11/17/19 1917 97.7 °F (36.5 °C) 89 18 (!) 138/94 93 %   11/17/19 1504    (!) 165/93    11/17/19 1459 97.9 °F (36.6 °C) 77 18 (!) 183/96 95 %   11/17/19 1056 97.7 °F (36.5 °C) (!) 58 18 142/77 96 %   11/17/19 0653 97.9 °F (36.6 °C) 77 18 142/72 96 %   11/17/19 0344 98.7 °F (37.1 °C) 71 18 124/75 93 %   11/16/19 2259 97.8 °F (36.6 °C) 76 18 155/87 93 %       Intake/Output Summary (Last 24 hours) at 11/17/2019 2120  Last data filed at 11/17/2019 8228  Gross per 24 hour   Intake 600 ml   Output    Net 600 ml        PHYSICAL EXAM:  General: Alert, awake, confused  EENT:  Anicteric sclerae. MMM  Resp:  CTA bilaterally, no wheezing or rales. No accessory muscle use  CV:  Regular  rhythm,  No edema  GI:  Soft, Non distended, Non tender.  +Bowel sounds  Neurologic:  Alert and awake, moving all 4 extremities  Psych:   Deferred due to confusion  Skin:  No rashes.   No jaundice    Reviewed most current lab test results and cultures  YES  Reviewed most current radiology test results   YES  Review and summation of old records today    NO  Reviewed patient's current orders and MAR    YES  PMH/SH reviewed - no change compared to H&P          Current Facility-Administered Medications:     lacosamide (VIMPAT) tablet 100 mg, 100 mg, Oral, Q12H, Jairo Gage MD    divalproex ER (DEPAKOTE ER) 24 hour tablet 500 mg, 500 mg, Oral, DAILY, Koffi Gage MD, 500 mg at 11/17/19 1323    amLODIPine (NORVASC) tablet 10 mg, 10 mg, Oral, DAILY, Koffi Gage MD, 10 mg at 11/17/19 0916    metoprolol tartrate (LOPRESSOR) tablet 50 mg, 50 mg, Oral, BID, Koffi Gage MD, 50 mg at 11/17/19 1330    amoxicillin-clavulanate (AUGMENTIN) 875-125 mg per tablet 1 Tab, 1 Tab, Oral, Q12H, Calos Espinoza MD, 1 Tab at 11/17/19 1218    allopurinol (ZYLOPRIM) tablet 100 mg, 100 mg, Oral, DAILY, Koffi Singleton MD, 100 mg at 11/17/19 0916    haloperidol lactate (HALDOL) injection 2 mg, 2 mg, IntraVENous, Q4H PRN, Lucio Richey MD    aspirin tablet 325 mg, 325 mg, Oral, DAILY, Leighann Corral MD, 325 mg at 11/17/19 0916    acetaminophen (TYLENOL) tablet 650 mg, 650 mg, Oral, Q4H PRN **OR** acetaminophen (TYLENOL) solution 650 mg, 650 mg, Per NG tube, Q4H PRN **OR** acetaminophen (TYLENOL) suppository 650 mg, 650 mg, Rectal, Q4H PRN, Nia Rothman MD  ________________________________________________________________________  Care Plan discussed with:    Comments   Patient y    Family      RN y    Care Manager     Consultant                        Multidiciplinary team rounds were held today with , nursing, pharmacist and clinical coordinator. Patient's plan of care was discussed; medications were reviewed and discharge planning was addressed. ________________________________________________________________________  Total NON critical care TIME:  35    Minutes    Total CRITICAL CARE TIME Spent:   Minutes non procedure based      Comments   >50% of visit spent in counseling and coordination of care     ________________________________________________________________________  Quita Santacruz MD     Procedures: see electronic medical records for all procedures/Xrays and details which were not copied into this note but were reviewed prior to creation of Plan. LABS:  I reviewed today's most current labs and imaging studies.   Pertinent labs include:  Recent Labs     11/16/19 0411   WBC 6.8   HGB 12.3   HCT 37.5        Recent Labs     11/17/19 0220 11/16/19 0411    140   K 3.5 3.6    106   CO2 24 26   GLU 77 94   BUN 20 19   CREA 0.94 1.11   CA 8.6 8.6       Signed: Quita Santacruz MD

## 2019-11-18 NOTE — PROGRESS NOTES
Hospitalist Progress Note    NAME: Car Stallings   :  1945   MRN:  [id number]     66-year-old male with history of schizophrenia, seizures presented to the hospital with acute confusional episode and neurology felt this episode was seizure and medications were adjusted. Family is now requesting transfer to [place] as patient is on monthly INVega and they are concerned that this medication may be interfering with seizure medications. Assessment / Plan:  Acute encephalopathy likely metabolic due to seizures and delirium  -Per family, patient has some underlying baseline confusion  -CT head did not show any acute process  -Ammonia level is within normal limits. Recent urine analysis normal.   -MRI of the brain is normal   -cont depakote and vimpat per neurology recommendation  -On Ativan as needed for seizures. Seizure precautions. Fall precautions.  -cont dysphagia diet  -Case management working on transfer to [place]. -Patient's MPOA niece is asking transfer to [place] due to need for schizophrenia medication adjustments as they might be interfering with his seizures. His mental health care provider is over at the [place]. Acute kidney injury likely prerenal based on KDIGO guidelines (more than 0.3 mg/dl increase)  -Creatinine improved with hydration and is normal now    Acute sinusitis  -CT shows evidence of acute sinusitis  -Continue Augmentin    Hypertension  -Continue home Norvasc and metoprolol. Hold lisinopril    Gouty arthritis  -Continue allopurinol     History of schizophrenia  -On paliperidone IM q. Monthly  -now on haldol prn for agitation      Disposition:  Waiting on transfer to [facility]. Body mass index is 26.13 kg/m².     Code status: Full  Prophylaxis: Hep SQ  Recommended Disposition: SNF/LTC     Subjective:     Chief Complaint / Reason for Physician Visit  He is alert, awake and confused but pleasant  No acute issues overnight  No further seizure activity        Review of Systems:  Symptom Y/N Comments  Symptom Y/N Comments   Fever/Chills    Chest Pain     Poor Appetite    Edema     Cough    Abdominal Pain     Sputum    Joint Pain     SOB/WILSON    Pruritis/Rash     Nausea/vomit    Tolerating PT/OT     Diarrhea    Tolerating Diet     Constipation    Other       Could NOT obtain due to:      Objective:     VITALS:   Last 24hrs VS reviewed since prior progress note. Most recent are:  Patient Vitals for the past 24 hrs:   Temp Pulse Resp BP SpO2   11/18/19 1206 98 °F (36.7 °C) 69 18 132/73 96 %   11/18/19 0826 97.7 °F (36.5 °C) 72 16 153/89 98 %   11/17/19 2305    158/65    11/17/19 2300 98.4 °F (36.9 °C) 79 18 (!) 158/99 95 %   11/17/19 2109    132/82    11/17/19 1917 97.7 °F (36.5 °C) 89 18 (!) 138/94 93 %   11/17/19 1504    (!) 165/93    11/17/19 1459 97.9 °F (36.6 °C) 77 18 (!) 183/96 95 %       Intake/Output Summary (Last 24 hours) at 11/18/2019 1315  Last data filed at 11/18/2019 1001  Gross per 24 hour   Intake 480 ml   Output    Net 480 ml        PHYSICAL EXAM:  General: Alert, awake, confused  EENT:  Anicteric sclerae. MMM  Resp:  CTA bilaterally, no wheezing or rales. No accessory muscle use  CV:  Regular  rhythm,  No edema  GI:  Soft, Non distended, Non tender.  +Bowel sounds  Neurologic:  Alert and awake, moving all 4 extremities  Psych:   Deferred due to confusion  Skin:  No rashes.   No jaundice    Reviewed most current lab test results and cultures  YES  Reviewed most current radiology test results   YES  Review and summation of old records today    NO  Reviewed patient's current orders and MAR    YES  PMH/SH reviewed - no change compared to H&P          Current Facility-Administered Medications:     lacosamide (VIMPAT) tablet 100 mg, 100 mg, Oral, Q12H, Koffi Gage MD, 100 mg at 11/18/19 0953    divalproex ER (DEPAKOTE ER) 24 hour tablet 500 mg, 500 mg, Oral, DAILY, Koffi Gage MD, 500 mg at 11/18/19 0952    heparin (porcine) injection 5,000 Units, 5,000 Units, SubCUTAneous, Q8H, Caleb Gage MD    amLODIPine (NORVASC) tablet 10 mg, 10 mg, Oral, DAILY, Koffi Gage MD, 10 mg at 11/18/19 0953    metoprolol tartrate (LOPRESSOR) tablet 50 mg, 50 mg, Oral, BID, Sita Tellez MD, 50 mg at 11/18/19 0953    amoxicillin-clavulanate (AUGMENTIN) 875-125 mg per tablet 1 Tab, 1 Tab, Oral, Q12H, Sita Tellez MD, 1 Tab at 11/18/19 0953    allopurinol (ZYLOPRIM) tablet 100 mg, 100 mg, Oral, DAILY, Sita Tellez MD, 100 mg at 11/18/19 0953    haloperidol lactate (HALDOL) injection 2 mg, 2 mg, IntraVENous, Q4H PRN, Sita Tellez MD    aspirin tablet 325 mg, 325 mg, Oral, DAILY, Prosper Corral MD, 325 mg at 11/18/19 1329    acetaminophen (TYLENOL) tablet 650 mg, 650 mg, Oral, Q4H PRN **OR** acetaminophen (TYLENOL) solution 650 mg, 650 mg, Per NG tube, Q4H PRN **OR** acetaminophen (TYLENOL) suppository 650 mg, 650 mg, Rectal, Q4H PRN, Gemma Cuellar MD  ________________________________________________________________________  Care Plan discussed with:    Comments   Patient y    Family      RN y    Care Manager     Consultant                        Multidiciplinary team rounds were held today with , nursing, pharmacist and clinical coordinator. Patient's plan of care was discussed; medications were reviewed and discharge planning was addressed. ________________________________________________________________________  Total NON critical care TIME:  35    Minutes    Total CRITICAL CARE TIME Spent:   Minutes non procedure based      Comments   >50% of visit spent in counseling and coordination of care     ________________________________________________________________________  Vanessa Gates MD     Procedures: see electronic medical records for all procedures/Xrays and details which were not copied into this note but were reviewed prior to creation of Plan. LABS:  I reviewed today's most current labs and imaging studies.   Pertinent labs include:  Recent Labs     11/16/19 0411   WBC 6.8   HGB 12.3   HCT 37.5        Recent Labs     11/17/19 0220 11/16/19 0411    140   K 3.5 3.6    106   CO2 24 26   GLU 77 94   BUN 20 19   CREA 0.94 1.11   CA 8.6 8.6       Signed: Naz Pham MD

## 2019-11-18 NOTE — CDMP QUERY
Documentation of Acute kidney injury is noted in the progress notes. Currently the patient does not meet RIFLE criteria (BSV approved) to support this diagnosis. If you are using another criteria to support this diagnosis, please document this in your progress note. Otherwise, please document in the progress notes the clinical indicators that support this diagnosis or state that the diagnosis has been ruled out. RIFLE  (BSV Approved) RISK:  Increased SCr x 1.5 or GFR decrease > 25% (within 7 days) INJURY:  Increased SCr x 2.0 or GFR decreased > 50% FAILURE:  Increased SCr x 3.0 or GFR decrease > 75% or SCr >4.0 mg/dL or acute increase >0.5 mg/dL LOSS:  Persistent acute renal failure = complete loss of kidney function > 4 weeks END STAGE:  End stage of kidney disease > 3 months AKIN 
 
STAGE  1:  Increase in SCr >/= 0.3 mg/dL or >/= 150% to 200% (1.5 to 2-fold) from baseline (within 48 hours) STAGE  2:  Increase in SCr to more than 200% to 300% (>2-3 fold) from baseline STAGE  3:  Increase in SCr to more than 300% (>3-fold) from baseline or SCr >/= 4.0 mg/dL with an acute increase of at least 0.5 mg/dL or initiation of renal replacement therapy KDIGO 
 
STAGE  1:  Increase in SCr by >/= 0.3 mg/dL within 48 hours or increase in SCr 1.5 to 1.9 times baseline which is known or presumed to have occurred within the prior 7 days STAGE  2:  Increase in SCr to 2.0 to 2.9 times baseline STAGE  3:  Increase in SCr to 3.0 times baseline or increase in SCr to >/= baseline or increase in SCr to >/= 4.0 mg/dL or initiation of renal replacement therapy Please clarify and document your clinical opinion in the progress notes and discharge summary including the definitive and/or presumptive diagnosis, (suspected or probable), related to the above clinical findings. Please include clinical findings supporting your diagnosis. Thanks, Jere Arciniega RN/CDMP

## 2019-11-18 NOTE — PROGRESS NOTES
EDIN Plan:    * Transfer to [facility] (niece/mPOA's preference)    > 2nd IM before d/c    3:56 PM: CM called [facility] admissions coordinator, Rakel Brown ([phone]: [phone]) to f/u on transfer request; CM reached voicemail and requested call back with update. 2:12 PM: CM met with pt's niece/Sophy Lansing Caller) at bedside to receive signature on [place] transfer request form; CM received signature. CM faxed transfer request forms and most recent hospitalist progress note to the [place] transfer [place] ([phone]) for review. CM will report updates as they become available. CM reviewed pt's chart and noted updates before moving forward with d/c planning. CM noted pt's clinicals were faxed to [place] on 11/17/19; CM completed transfer request paperwork, received attending physician's signature, and is waiting for pt's balaji Mustafa Caller: [phone]) to come to facility to sign on his behalf. Pt's niece confirmed that she would come to facility on her lunch break to provide signatures; projected arrival time is between 1:30-2:00 PM. CM will report updates when they become available. CM will continue to follow patient for discharge planning needs and arrange for services as deemed necessary.     MISTI Solis  Care Manager, [facility]

## 2019-11-18 NOTE — PROGRESS NOTES
Problem: Seizure Disorder (Adult)  Goal: *STG: Remains free of seizure activity  Outcome: Progressing Towards Goal  Goal: *STG: Maintains lab values within therapeutic range  Outcome: Progressing Towards Goal  Goal: *STG/LTG: Complies with medication therapy  Outcome: Progressing Towards Goal  Goal: *STG: Remains free of injury during seizure activity  Outcome: Progressing Towards Goal  Goal: *STG: Remains safe in hospital  Outcome: Progressing Towards Goal  Goal: Interventions  Outcome: Progressing Towards Goal     Problem: Falls - Risk of  Goal: *Absence of Falls  Description  Document Anupama Garcia Fall Risk and appropriate interventions in the flowsheet.   Outcome: Progressing Towards Goal  Note:   Fall Risk Interventions:  Mobility Interventions: Bed/chair exit alarm, OT consult for ADLs, Patient to call before getting OOB, PT Consult for mobility concerns, PT Consult for assist device competence    Mentation Interventions: Adequate sleep, hydration, pain control, Bed/chair exit alarm, Door open when patient unattended, Evaluate medications/consider consulting pharmacy, Gait belt with transfers/ambulation, Update white board, Toileting rounds, Reorient patient, More frequent rounding, Increase mobility    Medication Interventions: Assess postural VS orthostatic hypotension, Bed/chair exit alarm, Evaluate medications/consider consulting pharmacy, Teach patient to arise slowly, Patient to call before getting OOB, Utilize gait belt for transfers/ambulation    Elimination Interventions: Bed/chair exit alarm, Call light in reach, Patient to call for help with toileting needs, Stay With Me (per policy), Toilet paper/wipes in reach, Toileting schedule/hourly rounds    History of Falls Interventions: Bed/chair exit alarm         Problem: Patient Education: Go to Patient Education Activity  Goal: Patient/Family Education  Outcome: Progressing Towards Goal     Problem: Pressure Injury - Risk of  Goal: *Prevention of pressure injury  Description  Document Sandeep Scale and appropriate interventions in the flowsheet. Outcome: Progressing Towards Goal  Note:   Pressure Injury Interventions:  Sensory Interventions: Assess changes in LOC, Assess need for specialty bed, Avoid rigorous massage over bony prominences, Chair cushion, Check visual cues for pain, Discuss PT/OT consult with provider, Float heels, Keep linens dry and wrinkle-free, Maintain/enhance activity level, Minimize linen layers, Turn and reposition approx. every two hours (pillows and wedges if needed)    Moisture Interventions: Absorbent underpads, Apply protective barrier, creams and emollients, Assess need for specialty bed, Check for incontinence Q2 hours and as needed, Limit adult briefs, Maintain skin hydration (lotion/cream), Minimize layers, Moisture barrier    Activity Interventions: Increase time out of bed, PT/OT evaluation, Assess need for specialty bed    Mobility Interventions: Assess need for specialty bed, PT/OT evaluation, Turn and reposition approx.  every two hours(pillow and wedges)    Nutrition Interventions: Document food/fluid/supplement intake    Friction and Shear Interventions: HOB 30 degrees or less                Problem: Patient Education: Go to Patient Education Activity  Goal: Patient/Family Education  Outcome: Progressing Towards Goal     Problem: Patient Education: Go to Patient Education Activity  Goal: Patient/Family Education  Outcome: Progressing Towards Goal     Problem: Patient Education: Go to Patient Education Activity  Goal: Patient/Family Education  Outcome: Progressing Towards Goal     Problem: Non-Violent Restraints  Goal: *Removal from restraints as soon as assessed to be safe  Outcome: Progressing Towards Goal  Goal: *No harm/injury to patient while restraints in use  Outcome: Progressing Towards Goal  Goal: *Patient's dignity will be maintained  Outcome: Progressing Towards Goal  Goal: *Patient Specific Goal (EDIT GOAL, INSERT TEXT)  Outcome: Progressing Towards Goal  Goal: Non-violent Restaints:Standard Interventions  Outcome: Progressing Towards Goal  Goal: Non-violent Restraints:Patient Interventions  Outcome: Progressing Towards Goal  Goal: Patient/Family Education  Outcome: Progressing Towards Goal

## 2019-11-18 NOTE — PROGRESS NOTES
Bedside and Verbal shift change report given to Nidhi(oncoming nurse) by Jw Rachel (offgoing nurse). Report included the following information SBAR, Kardex, MAR, Recent Results and Cardiac Rhythm.

## 2019-11-18 NOTE — PROGRESS NOTES
* No surgery found *  * No surgery found *  Bedside and Verbal shift change report given to Kathy Alexander (oncoming nurse) by Charles Laguna (offgoing nurse). Report included the following information SBAR and Kardex. Zone Phone:   [phone]    Significant changes during shift:  Patient less agitated this shift     Patient Information    Vane Orozco  66 y.o.  11/14/2019  7:59 PM by Shay Haney MD. Vane Orozco was admitted from Home    Problem List    Patient Active Problem List    Diagnosis Date Noted    Seizure [facility]) 11/14/2019     Past Medical History:   Diagnosis Date    CAD (coronary artery disease)     Gout     Hypertension     [facility]ia ([facility]     Seizures ([facility]      Core Measures:    CVA: Yes Yes  CHF:No No  PNA:No No    Post Op Surgical (If Applicable):     Number times ambulated in hallway past shift:  0  Number of times OOB to chair past shift:   0  NG Tube: No  Incentive Spirometer: Yes  Drains: No   Volume    Dressing Present:  No  Flatus:  Yes    Activity Status:    OOB to Chair No  Ambulated this shift No   Bed Rest Yes    Supplemental O2: (If Applicable)    NC No  NRB No  Venti-mask No  On 0 Liters/min      LINES AND DRAINS:    PIV    DVT prophylaxis:    DVT prophylaxis Med- Yes  DVT prophylaxis SCD or SCAR- Yes     Wounds: (If Applicable)  Location     Patient Safety:    Falls Score Total Score: 4  Safety Level_______  Bed Alarm On? Yes  Sitter?  Yes    Plan for upcoming shift:  safety    Discharge Plan: Yes     Active Consults:  IP CONSULT TO HOSPITALIST  IP CONSULT TO NEUROLOGY

## 2019-11-19 PROCEDURE — 74011250637 HC RX REV CODE- 250/637: Performed by: INTERNAL MEDICINE

## 2019-11-19 PROCEDURE — 65270000015 HC RM PRIVATE ONCOLOGY

## 2019-11-19 PROCEDURE — 74011250636 HC RX REV CODE- 250/636: Performed by: INTERNAL MEDICINE

## 2019-11-19 RX ADMIN — LACOSAMIDE 100 MG: 100 TABLET, FILM COATED ORAL at 20:05

## 2019-11-19 RX ADMIN — HALOPERIDOL LACTATE 2 MG: 5 INJECTION, SOLUTION INTRAMUSCULAR at 20:05

## 2019-11-19 RX ADMIN — HEPARIN SODIUM 5000 UNITS: 5000 INJECTION INTRAVENOUS; SUBCUTANEOUS at 05:31

## 2019-11-19 RX ADMIN — AMOXICILLIN AND CLAVULANATE POTASSIUM 1 TABLET: 875; 125 TABLET, FILM COATED ORAL at 10:17

## 2019-11-19 RX ADMIN — HEPARIN SODIUM 5000 UNITS: 5000 INJECTION INTRAVENOUS; SUBCUTANEOUS at 14:52

## 2019-11-19 RX ADMIN — ASPIRIN 325 MG: 325 TABLET, FILM COATED ORAL at 10:17

## 2019-11-19 RX ADMIN — HEPARIN SODIUM 5000 UNITS: 5000 INJECTION INTRAVENOUS; SUBCUTANEOUS at 21:31

## 2019-11-19 RX ADMIN — ALLOPURINOL 100 MG: 100 TABLET ORAL at 10:17

## 2019-11-19 RX ADMIN — LACOSAMIDE 100 MG: 100 TABLET, FILM COATED ORAL at 10:17

## 2019-11-19 RX ADMIN — DIVALPROEX SODIUM 500 MG: 250 TABLET, EXTENDED RELEASE ORAL at 10:17

## 2019-11-19 NOTE — PROGRESS NOTES
EDIN Plan:    TBD Fern Frey denied transfer request     > 2nd IM before d/c    3:20 PM: CM received a call from pt's niece Russ Cain) who reported being \"upset that she was not informed her uncle transferred floors. \" CM provided pt's niece with the contact information for the new care manager. CM will consult with  moving forward to express concern regarding how updates are being relayed between staff members and pt's family. CM received call from [facility] with the [facility] ([phone]) reporting that upon reviewing transfer request, the physician believes the pt's medical needs have been resolved and that his current needs appear to be more psychiatric in nature. CM will consult with pt's attending physician to relay message. CM received a call from pt's niece/Sophy [facility]: [phone]) requesting update on status of transfer; CM informed her that transfer request was denied but that CM will f/u with attending physician to address action moving forward. Pt's niece informed CM that she plans on f/u with physician at the [facility] because she believes the pt should be transferred there even if the issues that remain are psychiatric in nature. CM will continue to follow patient for discharge planning needs and arrange for services as deemed necessary.     Aneta Merino, MSW  Care Manager, [facility]

## 2019-11-19 NOTE — PROGRESS NOTES
Chart reviewed. Attempted to see pt for PT intervention however pt soundly asleep upon arrival, unable to be aroused. With max tactile and verbal cueing, pt briefly flickered eyes opened however unable to maintain eyes in opened position or stay awake along enough for active participation. Therapy session aborted. Will continue to follow.     Yovani Hoover, PT, DPT

## 2019-11-19 NOTE — PROGRESS NOTES
Chart reviewed. Spoke with PT who attempted to see Pt for therapy intervention this afternoon. Pt was soundly asleep upon arrival, unable to be aroused. With max tactile and verbal cueing, pt briefly flickered eyes opened however unable to maintain eyes in opened position or stay awake along enough for active participation, therefore PT aborted session. Pt no appropriate for OT intervention at this time. Will defer and continue to follow.      [facility], OTR/L

## 2019-11-19 NOTE — PROGRESS NOTES
Problem: Falls - Risk of  Goal: *Absence of Falls  Description  Document Tilmatt Pickcarmen Fall Risk and appropriate interventions in the flowsheet. Outcome: Progressing Towards Goal  Note:   Fall Risk Interventions:  Mobility Interventions: Bed/chair exit alarm, Communicate number of staff needed for ambulation/transfer    Mentation Interventions: Adequate sleep, hydration, pain control, Door open when patient unattended    Medication Interventions: Bed/chair exit alarm    Elimination Interventions: Bed/chair exit alarm    History of Falls Interventions: Bed/chair exit alarm         Problem: Pressure Injury - Risk of  Goal: *Prevention of pressure injury  Description  Document Sandeep Scale and appropriate interventions in the flowsheet.   Outcome: Progressing Towards Goal  Note:   Pressure Injury Interventions:  Sensory Interventions: Assess changes in LOC    Moisture Interventions: Absorbent underpads, Apply protective barrier, creams and emollients    Activity Interventions: Increase time out of bed    Mobility Interventions: Assess need for specialty bed    Nutrition Interventions: Document food/fluid/supplement intake    Friction and Shear Interventions: Lift sheet, HOB 30 degrees or less

## 2019-11-19 NOTE — PROGRESS NOTES
Hospitalist Progress Note    NAME: Sydnee Sal   :  1945   MRN:  [id number]     75-year-old male with history of schizophrenia, seizures presented to the hospital with acute confusional episode and neurology felt this episode was seizure and medications were adjusted. Family is now requesting transfer to [place] as patient is on monthly INVega and they are concerned that this medication may be interfering with seizure medications. Assessment / Plan:  Acute encephalopathy likely metabolic due to seizures and delirium  -Per family, patient has some underlying baseline confusion  -CT head did not show any acute process  -Ammonia level is within normal limits. Recent urine analysis normal.   -MRI of the brain is normal   -cont depakote and vimpat per neurology recommendation  -On Ativan as needed for seizures. Seizure precautions. Fall precautions.  -cont dysphagia diet  -Case management working on transfer to [place]. -Patient's MPOA niece is asking transfer to [place] due to need for schizophrenia medication adjustments as they might be interfering with his seizures. His mental health care provider is over at the [place].    :  [facility]. Need to discuss disposition with pt's niece. Will continue current anti-epileptic meds and at current doses    Acute kidney injury likely prerenal based on KDIGO guidelines (more than 0.3 mg/dl increase)  -Creatinine improved with hydration and is normal now    Acute sinusitis  -CT shows evidence of acute sinusitis  -Continue Augmentin    Hypertension  -Continue home Norvasc and metoprolol. Hold lisinopril    Gouty arthritis  -Continue allopurinol     History of schizophrenia  -On paliperidone IM q. Monthly  -now on haldol prn for agitation      Disposition:  Waiting on transfer to [facility]. Body mass index is 26.13 kg/m².     Code status: Full  Prophylaxis: Hep SQ  Recommended Disposition: SNF/LTC     Subjective:     Chief Complaint / Reason for Physician Visit  He is alert, awake and confused but pleasant  No acute issues overnight  No further seizure activity    Review of Systems:  Symptom Y/N Comments  Symptom Y/N Comments   Fever/Chills    Chest Pain     Poor Appetite    Edema     Cough    Abdominal Pain     Sputum    Joint Pain     SOB/WILSON    Pruritis/Rash     Nausea/vomit    Tolerating PT/OT     Diarrhea    Tolerating Diet     Constipation    Other       Could NOT obtain due to: confused     Objective:     VITALS:   Last 24hrs VS reviewed since prior progress note. Most recent are:  Patient Vitals for the past 24 hrs:   Temp Pulse Resp BP SpO2   11/19/19 1535 97.9 °F (36.6 °C) 62 16 99/64 97 %   11/19/19 1217 97.8 °F (36.6 °C) 63 19 101/61 95 %   11/19/19 0918 97.8 °F (36.6 °C) 63 16 99/52 99 %   11/19/19 0419 98.3 °F (36.8 °C) 64 20 144/72 96 %   11/19/19 0016 98 °F (36.7 °C) 69 20 135/68    11/18/19 2228 98.5 °F (36.9 °C) 95 18 161/87 90 %   11/18/19 2018 98 °F (36.7 °C) (!) 103 18 (!) 157/99 98 %   11/18/19 1621 97.4 °F (36.3 °C) 65 18 99/60 98 %       Intake/Output Summary (Last 24 hours) at 11/19/2019 1544  Last data filed at 11/19/2019 0856  Gross per 24 hour   Intake 720 ml   Output    Net 720 ml        PHYSICAL EXAM:  General: Alert, awake, confused  EENT:  Anicteric sclerae. MMM  Resp:  CTA bilaterally, no wheezing or rales. No accessory muscle use  CV:  Regular  rhythm,  No edema  GI:  Soft, Non distended, Non tender.  +Bowel sounds  Neurologic:  Alert and awake, moving all 4 extremities  Psych:   Deferred due to confusion  Skin:  No rashes.   No jaundice    Reviewed most current lab test results and cultures  YES  Reviewed most current radiology test results   YES  Review and summation of old records today    NO  Reviewed patient's current orders and MAR    YES  PMH/SH reviewed - no change compared to H&P          Current Facility-Administered Medications:     lacosamide (VIMPAT) tablet 100 mg, 100 mg, Oral, Q12H, Koffi Gage MD FLOR, 100 mg at 11/19/19 1017    divalproex ER (DEPAKOTE ER) 24 hour tablet 500 mg, 500 mg, Oral, DAILY, Sanaz RAY MD, 500 mg at 11/19/19 1017    heparin (porcine) injection 5,000 Units, 5,000 Units, SubCUTAneous, Q8H, Eric Gage MD, 5,000 Units at 11/19/19 1452    amLODIPine (NORVASC) tablet 10 mg, 10 mg, Oral, DAILY, Eric Parker MD, Stopped at 11/19/19 1017    metoprolol tartrate (LOPRESSOR) tablet 50 mg, 50 mg, Oral, BID, Alex Reaves MD, Stopped at 11/19/19 1017    amoxicillin-clavulanate (AUGMENTIN) 875-125 mg per tablet 1 Tab, 1 Tab, Oral, Q12H, Alex Reaves MD, 1 Tab at 11/19/19 1017    allopurinol (ZYLOPRIM) tablet 100 mg, 100 mg, Oral, DAILY, Koffi Parker MD, 100 mg at 11/19/19 1017    haloperidol lactate (HALDOL) injection 2 mg, 2 mg, IntraVENous, Q4H PRN, Alex Reaves MD, 2 mg at 11/18/19 2213    aspirin tablet 325 mg, 325 mg, Oral, DAILY, Leighann Corral MD, 325 mg at 11/19/19 1017    acetaminophen (TYLENOL) tablet 650 mg, 650 mg, Oral, Q4H PRN **OR** acetaminophen (TYLENOL) solution 650 mg, 650 mg, Per NG tube, Q4H PRN **OR** acetaminophen (TYLENOL) suppository 650 mg, 650 mg, Rectal, Q4H PRN, Florencio Vences MD  ________________________________________________________________________  Care Plan discussed with:    Comments   Patient y    Family      RN y    Care Manager     Consultant                        Multidiciplinary team rounds were held today with , nursing, pharmacist and clinical coordinator. Patient's plan of care was discussed; medications were reviewed and discharge planning was addressed.      ________________________________________________________________________  Total NON critical care TIME:  35    Minutes    Total CRITICAL CARE TIME Spent:   Minutes non procedure based      Comments   >50% of visit spent in counseling and coordination of care     ________________________________________________________________________  Parker Prado MD Procedures: see electronic medical records for all procedures/Xrays and details which were not copied into this note but were reviewed prior to creation of Plan. LABS:  I reviewed today's most current labs and imaging studies. Pertinent labs include:  No results for input(s): WBC, HGB, HCT, PLT, HGBEXT, HCTEXT, PLTEXT, HGBEXT, HCTEXT, PLTEXT in the last 72 hours.   Recent Labs     11/17/19  0220      K 3.5      CO2 24   GLU 77   BUN 20   CREA 0.94   CA 8.6       Signed: Heath Mackey MD

## 2019-11-19 NOTE — PROGRESS NOTES
Pt resting in bed with sitter in room, restless, monitored for safety, All meds given as ordered, crushed in applesauce, taken well. Pt encouraged to increase po intake, po fluids offered and taken well.  Bed at low level, call bell within reach, alarms on and functioning properly

## 2019-11-20 PROCEDURE — 74011250636 HC RX REV CODE- 250/636: Performed by: INTERNAL MEDICINE

## 2019-11-20 PROCEDURE — 74011250637 HC RX REV CODE- 250/637: Performed by: INTERNAL MEDICINE

## 2019-11-20 PROCEDURE — 65270000015 HC RM PRIVATE ONCOLOGY

## 2019-11-20 RX ADMIN — LACOSAMIDE 100 MG: 100 TABLET, FILM COATED ORAL at 21:17

## 2019-11-20 RX ADMIN — HEPARIN SODIUM 5000 UNITS: 5000 INJECTION INTRAVENOUS; SUBCUTANEOUS at 21:17

## 2019-11-20 RX ADMIN — AMLODIPINE BESYLATE 10 MG: 5 TABLET ORAL at 11:31

## 2019-11-20 RX ADMIN — METOPROLOL TARTRATE 50 MG: 50 TABLET, FILM COATED ORAL at 18:42

## 2019-11-20 RX ADMIN — DIVALPROEX SODIUM 500 MG: 250 TABLET, EXTENDED RELEASE ORAL at 11:30

## 2019-11-20 RX ADMIN — HEPARIN SODIUM 5000 UNITS: 5000 INJECTION INTRAVENOUS; SUBCUTANEOUS at 06:12

## 2019-11-20 RX ADMIN — ALLOPURINOL 100 MG: 100 TABLET ORAL at 11:30

## 2019-11-20 RX ADMIN — METOPROLOL TARTRATE 50 MG: 50 TABLET, FILM COATED ORAL at 11:31

## 2019-11-20 RX ADMIN — ASPIRIN 325 MG: 325 TABLET, FILM COATED ORAL at 11:31

## 2019-11-20 RX ADMIN — LACOSAMIDE 100 MG: 100 TABLET, FILM COATED ORAL at 11:31

## 2019-11-20 RX ADMIN — HEPARIN SODIUM 5000 UNITS: 5000 INJECTION INTRAVENOUS; SUBCUTANEOUS at 14:59

## 2019-11-20 NOTE — INTERDISCIPLINARY ROUNDS
Oncology Interdisciplinary rounds were held today to discuss patient plan of care and outcomes. The following members were present: Nursing, Physician, Case Management, Pharmacy, and PT/OT Actual Length of Stay: 6 DRG GLOS: 4.3 Expected Length of Stay: 4d 7h 
 
 
 
               Plan            Discharge Nutrition consulted SNF/LTC

## 2019-11-20 NOTE — PROGRESS NOTES
CM spoke to nursing to make sure that Psych consult had been called and assure that psychiatry note is placed on chart for communication with [address] [place] [address] awaiting clinical updates for review - specifically psych notes. Patient's niece updated with plan of care via telephone by unit Care Manager.       Gloria Locke, RN, BSN, [address] [place] [address]     [phone]

## 2019-11-20 NOTE — PROGRESS NOTES
Oncology End of Shift Note      Bedside shift change report given to DANNA Drake (incoming nurse) by Kathlyn Bence (outgoing nurse) on [facility] [place]. Report included the following information SBAR, Kardex, Intake/Output and MAR. Shift Summary: Pt remained stable throughout shift. Scheduled meds given, 1 med held due to bp being too low, no PRN meds given. Tele-sitter placed in pts room to see if a sitter can be pulled entirely. Pt was admitted to Oncology floor today. Pt turned q2h, hourly rounding completed. Issues for Physician to Address:       Patient on Cardiac Monitoring?     [] Yes  [x] No    Rhythm:            Kathlyn Bence

## 2019-11-20 NOTE — PROGRESS NOTES
Nutrition Assessment:    INTERVENTIONS/RECOMMENDATIONS:   · Continue current diet, progression per SLP  · Please continue to document % meals  consumed in flowsheet I/O's under intake     ASSESSMENT:   Chart reviewed. Pt was sleeping during visit attempt however PCT reports pt usually eats well. This is confirmed by flowsheet documentation which shows % of meals consumed over the past 72 hrs. Diet Order: (Dysphagia Advanced Soft)  % Eaten:    Patient Vitals for the past 72 hrs:   % Diet Eaten   11/19/19 0856 80 %   11/18/19 1807 95 %   11/18/19 1001 100 %   11/17/19 1241 75 %     Pertinent Medications: [x]Reviewed:   Pertinent Labs: [x]Reviewed:   Food Allergies: [x]NKFA  []Other   Last BM:  11/19  Edema:   n/a   []RUE   []LUE   []RLE   []LLE      Pressure Ulcer:   n/a   [] Stage I   [] Stage II   [] Stage III   [] Stage IV      Anthropometrics: Height: 5' 10\" (177.8 cm) Weight: 82.6 kg (182 lb 1.6 oz)    IBW (%IBW):   ( ) UBW (%UBW):   (  %)    BMI: Body mass index is 26.13 kg/m². This BMI is indicative of:  []Underweight   []Normal   [x]Overweight   [] Obesity   [] Extreme Obesity (BMI>40)  Last Weight Metrics:  Weight Loss Metrics 11/14/2019 11/6/2019   Today's Wt 182 lb 1.6 oz 184 lb 11.9 oz   BMI 26.13 kg/m2 26.51 kg/m2       Estimated Nutrition Needs (Based on): 2036 Kcals/day(BMR (1566) x 1. 3AF) , 83 g(1.0 g/kg bw) Protein  Carbohydrate: At Least 130 g/day  Fluids: 2036 mL/day or per primary team    NUTRITION DIAGNOSES:   Problem:  No nutritional diagnosis at this time      Etiology: related to       Signs/Symptoms: as evidenced by      Previous Nutrition Dx:  [] Resolved  [] Unresolved           [] Progressing    NUTRITION INTERVENTIONS:  Meals/Snacks: General/healthful diet                  GOAL:   consume>75% of meals in 5-7 days    NUTRITION MONITORING AND EVALUATION      Food/Nutrient Intake Outcomes:  Total energy intake  Physical Signs/Symptoms Outcomes: Weight/weight change    Previous Goal Met:   [x] Met              [] Progressing Towards Goal              [] Not Progressing Towards Goal   Previous Recommendations:   [] Implemented          [] Not Implemented          [x] Not Applicable    LEARNING NEEDS (Diet, Food/Nutrient-Drug Interaction):    [x] None Identified   [] Identified and Education Provided/Documented   [] Identified and Pt declined/was not appropriate     Cultural, Baptist, OR Ethnic Dietary Needs:    [x] None Identified   [] Identified and Addressed     [x] Interdisciplinary Care Plan Reviewed/Documented    [x] Discharge Planning: diet consistency per SLP   [] Participated in Interdisciplinary Rounds    NUTRITION RISK:    [] High              [] Moderate           []  Low  []  Minimal/Uncompromised      Steve Paul RDN  Pager [phone]  Weekend Pager [phone]

## 2019-11-20 NOTE — CONSULTS
PSYCHIATRIC CONSULTATION NOTE:    REASON FOR CONSULT:    A psychiatric consultation was requested by James Parikh MD to evaluate or provide advice/opinion related to evaluating history of schizophrenia, on monthly Invega injections. ? whether this would be interfering with patients anti epileptic medications causing his mental status to be worse than baseline. HISTORY OF PRESENTING COMPLAINT:     Mr. Rebecca Engel is a 66 y.o. WHITE OR  male who is currently admitted to the medical floor at [facility] on 11/14/2019 for the treatment of <principal problem not specified>. Patient presents s/p seizure or stroke and the primary team called this consult to ensure his monthly injections of [place] are not interfering the anti-epileptic medications he take. On assessment patient is lying in bed with a virtual sitter. Patient is reported to be hard of hearing, this writer had to touch the patients leg and shake it while verbally calling his name several times before patient was aroused. Patient is unable to participate with full assessment due to present presentation. Patient with much prompting was able to deny SI/HI/AVH. REVIEW OF SYMPTOMS:  Unable to assess, patient unable to provide response    PAST MEDICAL HISTORY:  Please see History & Physical for details. Past Medical History:   Diagnosis Date    CAD (coronary artery disease)     Gout     Hypertension     [facility] ([facility]     Seizures ([facility]          ALLERGIES:  No Known Allergies    MEDICATIONS PRIOR TO ADMISSION:  Medications Prior to Admission   Medication Sig    ALLOPURINOL PO Take  by mouth.  amLODIPine (NORVASC) 10 mg tablet Take 10 mg by mouth daily.  hydrOXYzine HCl (ATARAX) 10 mg tablet Take 10 mg by mouth two (2) times a day.  loratadine (CLARITIN) 10 mg tablet Take 10 mg by mouth.  omega-3s/dha/epa/fish oil/D3 (VITAMIN-D + OMEGA-3 PO) Take  by mouth.     vitamin B complex (B COMPLEX-VITAMIN B12 PO) Take  by mouth.  lisinopril (PRINIVIL, ZESTRIL) 40 mg tablet Take 20 mg by mouth daily.  PALIPERIDONE PALMITATE IM by IntraMUSCular route. Pt gets this every 4 weeks for schizophrenia    divalproex ER (DEPAKOTE ER) 500 mg ER tablet Take 500 mg by mouth.  metoprolol tartrate (LOPRESSOR) 50 mg tablet Take 50 mg by mouth two (2) times a day.  aspirin (ASPIRIN) 325 mg tablet Take 325 mg by mouth daily.        CURRENT MEDICATIONS:    Current Facility-Administered Medications:     lacosamide (VIMPAT) tablet 100 mg, 100 mg, Oral, Q12H, Koffi Reid MD, 100 mg at 11/20/19 1131    divalproex ER (DEPAKOTE ER) 24 hour tablet 500 mg, 500 mg, Oral, DAILY, Koffi Reid MD, 500 mg at 11/20/19 1130    heparin (porcine) injection 5,000 Units, 5,000 Units, SubCUTAneous, Q8H, Jeyson Gage MD, 5,000 Units at 11/20/19 0612    amLODIPine (NORVASC) tablet 10 mg, 10 mg, Oral, DAILY, Koffi Reid MD, 10 mg at 11/20/19 1131    metoprolol tartrate (LOPRESSOR) tablet 50 mg, 50 mg, Oral, BID, Jeyson Reid MD, 50 mg at 11/20/19 1131    allopurinol (ZYLOPRIM) tablet 100 mg, 100 mg, Oral, DAILY, Koffi Reid MD, 100 mg at 11/20/19 1130    haloperidol lactate (HALDOL) injection 2 mg, 2 mg, IntraVENous, Q4H PRN, Aure Crain MD, 2 mg at 11/19/19 2005    aspirin tablet 325 mg, 325 mg, Oral, DAILY, Leighann Corral MD, 325 mg at 11/20/19 1131    acetaminophen (TYLENOL) tablet 650 mg, 650 mg, Oral, Q4H PRN **OR** acetaminophen (TYLENOL) solution 650 mg, 650 mg, Per NG tube, Q4H PRN **OR** acetaminophen (TYLENOL) suppository 650 mg, 650 mg, Rectal, Q4H PRN, Shu Lawson MD     LAB RESULTS:  Lab Results   Component Value Date/Time    WBC 6.8 11/16/2019 04:11 AM    HGB 12.3 11/16/2019 04:11 AM    HCT 37.5 11/16/2019 04:11 AM    PLATELET 935 55/17/7080 04:11 AM    MCV 94.0 11/16/2019 04:11 AM      Lab Results   Component Value Date/Time    Sodium 140 11/17/2019 02:20 AM    Potassium 3.5 11/17/2019 02:20 AM Chloride 108 11/17/2019 02:20 AM    CO2 24 11/17/2019 02:20 AM    Anion gap 8 11/17/2019 02:20 AM    Glucose 77 11/17/2019 02:20 AM    BUN 20 11/17/2019 02:20 AM    Creatinine 0.94 11/17/2019 02:20 AM    BUN/Creatinine ratio 21 (H) 11/17/2019 02:20 AM    GFR est AA >60 11/17/2019 02:20 AM    GFR est non-AA >60 11/17/2019 02:20 AM    Calcium 8.6 11/17/2019 02:20 AM    Bilirubin, total 0.4 11/14/2019 08:15 PM    AST (SGOT) 24 11/14/2019 08:15 PM    Alk. phosphatase 64 11/14/2019 08:15 PM    Protein, total 6.4 11/14/2019 08:15 PM    Albumin 2.8 (L) 11/14/2019 08:15 PM    Globulin 3.6 11/14/2019 08:15 PM    A-G Ratio 0.8 (L) 11/14/2019 08:15 PM    ALT (SGPT) 26 11/14/2019 08:15 PM        PAST PSYCHIATRIC HISTORY:  Unable to assess, patient unable to provide response. Per the chart view patient has diagnosis of schizophrenia and is on [place] injections. SUBSTANCE ABUSE HISTORY:  Social History     Substance and Sexual Activity   Drug Use Never      Drug Screen Most Recent Result Date     No resulted procedures found.            PSYCHOSOCIAL HISTORY:  Unable to assess, patient unable to provide response    MENTAL STATUS EXAM:  General appearance:  Disheveled in [facility] contact: Variable, most of the assessment patient is staring in the direction of the ceiling  Speech: Mumbled, diminished, hesitant  Affect: Flat  Mood: Unable to assess, patient unable to provide response  Orientation: Arousal via touch, patient is oriented to self only at the time of this assessment  Thought Process: Illogical   Perception: Denies AVH/Paranoia   Thought Content: Denies SI/HI  Insight: Unable to assess, patient unable to provide response  Judgement: Unable to assess, patient unable to provide response  Cognition: Unable to assess, patient unable to provide response  Impulse Control: Unable to assess, patient unable to provide response    ASSESSMENT AND PLAN/RECOMMENDATION:  Roman Cates meets criteria for a diagnosis of Schizophrenia, Delirium     At this time I recommend/plan the following: At this time the patient will not benefit from inpatient psychiatric treatment. Please consult neurology as the patients presentation seems organic in nature. It is unlikely that the re-occurence of the monthly injections have now caused a new baseline for this patient suddenly. Please be advised that seizures are noted as a rare side effect however this patient has a history of seizures and have been managed on anti-epileptic. Psychiatry will sign off at this time. Please consult Psychiatry again for any concerns regarding the patient's mental health changes and/or management or if the team has anymore information in regards to this case. Thank you for the opportunity to participate in the care of your patient.

## 2019-11-21 LAB
ANION GAP SERPL CALC-SCNC: 5 MMOL/L (ref 5–15)
BUN SERPL-MCNC: 41 MG/DL (ref 6–20)
BUN/CREAT SERPL: 39 (ref 12–20)
CALCIUM SERPL-MCNC: 8.3 MG/DL (ref 8.5–10.1)
CHLORIDE SERPL-SCNC: 107 MMOL/L (ref 97–108)
CO2 SERPL-SCNC: 26 MMOL/L (ref 21–32)
CREAT SERPL-MCNC: 1.04 MG/DL (ref 0.7–1.3)
GLUCOSE SERPL-MCNC: 95 MG/DL (ref 65–100)
POTASSIUM SERPL-SCNC: 4.1 MMOL/L (ref 3.5–5.1)
SODIUM SERPL-SCNC: 138 MMOL/L (ref 136–145)

## 2019-11-21 PROCEDURE — 97530 THERAPEUTIC ACTIVITIES: CPT | Performed by: PHYSICAL THERAPIST

## 2019-11-21 PROCEDURE — 97535 SELF CARE MNGMENT TRAINING: CPT

## 2019-11-21 PROCEDURE — 74011250637 HC RX REV CODE- 250/637: Performed by: INTERNAL MEDICINE

## 2019-11-21 PROCEDURE — 65270000015 HC RM PRIVATE ONCOLOGY

## 2019-11-21 PROCEDURE — 36415 COLL VENOUS BLD VENIPUNCTURE: CPT

## 2019-11-21 PROCEDURE — 94760 N-INVAS EAR/PLS OXIMETRY 1: CPT

## 2019-11-21 PROCEDURE — 74011250636 HC RX REV CODE- 250/636: Performed by: INTERNAL MEDICINE

## 2019-11-21 PROCEDURE — 80048 BASIC METABOLIC PNL TOTAL CA: CPT

## 2019-11-21 RX ADMIN — DIVALPROEX SODIUM 500 MG: 250 TABLET, EXTENDED RELEASE ORAL at 10:44

## 2019-11-21 RX ADMIN — HEPARIN SODIUM 5000 UNITS: 5000 INJECTION INTRAVENOUS; SUBCUTANEOUS at 06:25

## 2019-11-21 RX ADMIN — LACOSAMIDE 100 MG: 100 TABLET, FILM COATED ORAL at 21:08

## 2019-11-21 RX ADMIN — LACOSAMIDE 100 MG: 100 TABLET, FILM COATED ORAL at 10:45

## 2019-11-21 RX ADMIN — HEPARIN SODIUM 5000 UNITS: 5000 INJECTION INTRAVENOUS; SUBCUTANEOUS at 15:52

## 2019-11-21 RX ADMIN — METOPROLOL TARTRATE 50 MG: 50 TABLET, FILM COATED ORAL at 10:44

## 2019-11-21 RX ADMIN — METOPROLOL TARTRATE 50 MG: 50 TABLET, FILM COATED ORAL at 17:12

## 2019-11-21 RX ADMIN — HEPARIN SODIUM 5000 UNITS: 5000 INJECTION INTRAVENOUS; SUBCUTANEOUS at 21:08

## 2019-11-21 RX ADMIN — ALLOPURINOL 100 MG: 100 TABLET ORAL at 10:44

## 2019-11-21 RX ADMIN — ASPIRIN 325 MG: 325 TABLET, FILM COATED ORAL at 10:44

## 2019-11-21 RX ADMIN — AMLODIPINE BESYLATE 10 MG: 5 TABLET ORAL at 10:45

## 2019-11-21 NOTE — PROGRESS NOTES
Problem: Self Care Deficits Care Plan (Adult)  Goal: *Acute Goals and Plan of Care (Insert Text)  Description    FUNCTIONAL STATUS PRIOR TO ADMISSION: Unable to obtain this session as Pt is not reliable historian. Says he uses a cane and RW yet unsure as to accuracy of statement. HOME SUPPORT: Per chart, pt resides at nursing home or group home, yet unclear. Will update as more information is gleaned. Occupational Therapy Goals  Initiated 11/15/2019  1. Patient will perform self-feeding with supervision/set-up within 7 day(s). 2.  Patient will perform grooming tasks sitting unsupported at EOB with supervision/set-up within 7 day(s). 3.  Patient will perform upper body dressing with supervision/set-up within 7 day(s). 4.  Patient will perform lower body dressing with minimal assistance within 7 days. 5.  Patient will perform toilet transfers with CGA using least restrictive device within 7 day(s). 6.  Patient will perform all aspects of toileting with minimal assistance within 7 day(s). Outcome: Progressing Towards Goal     Problem: Patient Education: Go to Patient Education Activity  Goal: Patient/Family Education  Outcome: Progressing Towards Goal   OCCUPATIONAL THERAPY TREATMENT  Patient: Santiago Blackburn (66 y.o. male)  Date: 11/21/2019  Diagnosis: [facility] [R56.9]   <principal problem not specified>       Precautions: Fall, Bed Alarm  Chart, occupational therapy assessment, plan of care, and goals were reviewed. ASSESSMENT  Patient continues with skilled OT services and is progressing towards goals.   No inappropriate behaviors today; max increased time but willing to participate in all requested tasks; very little initiation but willing to participate with all facilitated tasks    Current Level of Function Impacting Discharge (ADLs): D-max A self care; D-max A x2-3,  functional mobility    Other factors to consider for discharge: would need max A 24/7 care of at least 1 if not 2 PLAN :  Patient continues to benefit from skilled intervention to address the above impairments. Continue treatment per established plan of care. to address goals. Recommend with staff: set up/1:1 with all meals, encourage grooming, UE AROM    Recommend next OT session: PROM R UE, AAROM, reaching light functional use R UE    Recommendation for discharge: (in order for the patient to meet his/her long term goals)  Therapy up to 5 days/week in SNF setting    This discharge recommendation:  Has been made in collaboration with the attending provider and/or case management    IF patient discharges home will need the following DME: bedside commode, gait belt, hospital bed, transfer bench, and wheelchair       SUBJECTIVE:   Patient stated [place] will try that.     OBJECTIVE DATA SUMMARY:   Cognitive/Behavioral Status:  Neurologic State: Alert;Confused  Orientation Level: Oriented to person;Disoriented to place; Disoriented to situation;Disoriented to time(turns to his name while seated)  Cognition: Decreased command following;Decreased attention/concentration(follows 25% 1 step familiar commands)  Perception: Appears intact(able to reach and hold drink accurately)  Perseveration: Perseverates during ADLS(once items grasped with assist, decreased release skills)  Safety/Judgement: Decreased insight into deficits; Decreased awareness of need for safety;Decreased awareness of need for assistance;Decreased awareness of environment; Fall prevention    Functional Mobility and Transfers for ADLs:  Bed Mobility:  Supine to Sit: Total assistance  Sit to Supine: Total assistance    Transfers:  Sit to Stand: Total assistance;Assist x2; Additional time     Bed to Chair: (not yet safe)    Balance:  Sitting: Impaired; With support  Sitting - Static: Poor (constant support)(toward end of session min A; initially D-max x 2)  Sitting - Dynamic: Poor (constant support)(posterior lean)    ADL Intervention:  Feeding  Feeding Assistance: Set-up; Supervision  Container Management: Total assistance (dependent)  Cutting Food: Total assistance (dependent)  Utensil Management: Minimum assistance; Moderate assistance  Food to Mouth: Supervision(muffin)  Drink to Mouth: Set-up(drink had to be physically placed x 1; later did reach for )  Cues: Physical assistance; Tactile cues provided;Verbal cues provided;Visual cues provided    Grooming  Grooming Assistance: Total assistance(dependent)(inferred based on other ADl ability of initiation)                        Toileting  Toileting Assistance: Total assistance(dependent)(incontinent bladder; unaware)    Cognitive Retraining  Safety/Judgement: Decreased insight into deficits; Decreased awareness of need for safety;Decreased awareness of need for assistance;Decreased awareness of environment; Fall prevention        Pain:  Withdrawal reflex upon touch and AROM/PROM R UE/hand; unable to quantify but patient consistently withdraws from movement    Activity Tolerance:   Poor  Please refer to the flowsheet for vital signs taken during this treatment.     After treatment patient left in no apparent distress:   Restraints    COMMUNICATION/COLLABORATION:   The patients plan of care was discussed with: bed with meal; Nsg tech assisting; rails x 3    Velvet Plate OTR/L  Time Calculation: 20 mins

## 2019-11-21 NOTE — PROGRESS NOTES
Hospitalist Progress Note    NAME: Lucy Gustafson   :  1945   MRN:  [id number]       Assessment / Plan:  Acute encephalopathy likely metabolic due to seizures and delirium  -Per family, patient has some underlying baseline confusion  -19 Head CTCT IMPRESSION: No acute intracranial abnormality. Possible acute sinusitis involving the right frontal sinus and ethmoidal air cells  -Ammonia level is within normal limits.  Recent urine analysis normal.   -19 MRI Brain IMPRESSION: No acute intracranial hemorrhage or infarct.  -Conitnue depakote and vimpat per neurology recommendation  -On Ativan PRN for seizures.   -Continue Seizure precautions.   -Continue Fall precautions.  -Continue dysphagia diet  -CM working on transfer to [place]. -Patient's MPOA niece is asking transfer to [place] due to need for schizophrenia medication adjustments as they might be interfering with his seizures.  His mental health care provider is over at the [place].       Acute kidney injury likely prerenal improved cr now 1.04mg/dl today -Creatinine improved with hydration and is normal now  -BMP in am         Acute sinusitis stable   -CT shows evidence of acute sinusitis  -Augmentin completed 19        Hypertension  -Continue home Norvasc and metoprolol. Hold lisinopril because of S[place]NCHEZ         Gouty arthritis stable   -Continue allopurinol      History of schizophrenia  -On paliperidone IM q. Monthly  -now on haldol prn for agitation  Waiting for placement at [facility] , case management on case    Body mass index is 26.13 kg/m². Code status: full  Prophylaxis: Heparin  [facility]     Subjective:     Chief Complaint / Reason for Physician Visit   Discussed with RN events overnight.  Seen and examined this morning no events overnight     Review of Systems:  Symptom Y/N Comments  Symptom Y/N Comments   Fever/Chills n   Chest Pain n    Poor Appetite n   Edema n    Cough n   Abdominal Pain     Sputum n   Joint Pain     SOB/WILSON n   Pruritis/Rash Nausea/vomit n   Tolerating PT/OT     Diarrhea n   Tolerating Diet     Constipation    Other       Could NOT obtain due to:      Objective:     VITALS:   Last 24hrs VS reviewed since prior progress note. Most recent are:  Patient Vitals for the past 24 hrs:   Temp Pulse Resp BP SpO2   11/21/19 0800 98.6 °F (37 °C) 65 18 137/73 94 %   11/20/19 2302 98.3 °F (36.8 °C) 74 18 166/81 97 %   11/20/19 1942 98.6 °F (37 °C) 71 18 111/63 96 %   11/20/19 1841  84  156/74    11/20/19 1555 97.9 °F (36.6 °C) 68 18 121/68 96 %     No intake or output data in the 24 hours ending 11/21/19 1154     PHYSICAL EXAM:  General: WD, WN. Alert, cooperative, no acute distress    EENT:  EOMI. Anicteric sclerae. MMM  Resp:  CTA bilaterally, no wheezing or rales. No accessory muscle use  CV:  Regular  rhythm,  No edema  GI:  Soft, Non distended, Non tender.  +Bowel sounds  Neurologic:  Confused    Psych:    No good insight       Reviewed most current lab test results and cultures  YES  Reviewed most current radiology test results   YES  Review and summation of old records today    NO  Reviewed patient's current orders and MAR    YES  PMH/ reviewed - no change compared to H&P  ________________________________________________________________________  Care Plan discussed with:    Comments   Patient     Family      RN     Care Manager     Consultant                        Multidiciplinary team rounds were held today with , nursing, pharmacist and clinical coordinator. Patient's plan of care was discussed; medications were reviewed and discharge planning was addressed.      ________________________________________________________________________      Total CRITICAL CARE TIME Spent:   Minutes non procedure based      Comments   >50% of visit spent in counseling and coordination of care     ________________________________________________________________________  Jessica Hodge MD     Procedures: see electronic medical records for all procedures/Xrays and details which were not copied into this note but were reviewed prior to creation of Plan. LABS:  I reviewed today's most current labs and imaging studies. Pertinent labs include:  No results for input(s): WBC, HGB, HCT, PLT, HGBEXT, HCTEXT, PLTEXT in the last 72 hours.   Recent Labs     11/21/19  0004      K 4.1      CO2 26   GLU 95   BUN 41*   CREA 1.04   CA 8.3*       Signed: Rhys Woods MD

## 2019-11-21 NOTE — PROGRESS NOTES
Problem: Mobility Impaired (Adult and Pediatric)  Goal: *Acute Goals and Plan of Care (Insert Text)  Description  FUNCTIONAL STATUS PRIOR TO ADMISSION: patient very confused and unable to provide PLOF. Patient reports possibly ambulating with SPC vs RW. HOME SUPPORT PRIOR TO ADMISSION: The patient lives at SNF per chart. Patient unable to provide information. Physical Therapy Goals  Initiated 11/15/2019  1. Patient will move from supine to sit and sit to supine , scoot up and down and roll side to side in bed with supervision/set-up within 7 day(s). 2.  Patient will transfer from bed to chair and chair to bed with minimal assistance/contact guard assist using the least restrictive device within 7 day(s). 3.  Patient will perform sit to stand with minimal assistance/contact guard assist within 7 day(s). 4.  Patient will ambulate with minimal assistance/contact guard assist for 50 feet with the least restrictive device within 7 day(s). Note:   PHYSICAL THERAPY TREATMENT  Patient: Rodrigo Ivy (66 y.o. male)  Date: 11/21/2019  Diagnosis: Seizure ([facility] [R56.9]   <principal problem not specified>       Precautions: Fall, Bed Alarm  Chart, physical therapy assessment, plan of care and goals were reviewed. ASSESSMENT  Patient continues with skilled PT services and is slowly progressing towards goals. Patient agreeable to mobility but has difficulty following any commands. Bed mobility with total assist x 2. Patient able to sit EOB with min assist briefly but begins to lean backwards, requiring max assist to maintain balance. Patient able to assist with holding milk while drinking through straw. Patient sat EOB x 6-7 minutes. Sit to stand x 2 with total assist.  Patient able to clear hips but not come to full stand. Wet pads and brief removed while attempting to stand. Assisted back to supine with total assist x 2.   Assisted patient with rolling for hygiene with max assist.  Patient currently requiring max assist for all mobility and will benefit from continued PT at discharge. Current Level of Function Impacting Discharge (mobility/balance): max assist x 2    Other factors to consider for discharge: Mental status; cooperation         PLAN :  Patient continues to benefit from skilled intervention to address the above impairments. Continue treatment per established plan of care. to address goals. Recommendation for discharge: (in order for the patient to meet his/her long term goals)  Therapy up to 5 days/week in SNF setting    This discharge recommendation:  Has been made in collaboration with the attending provider and/or case management    IF patient discharges home will need the following DME: bedside commode, gait belt, and rolling walker       SUBJECTIVE:   Patient stated Okay.     OBJECTIVE DATA SUMMARY:   Critical Behavior:  Neurologic State: Alert, Confused  Orientation Level: Oriented to person, Disoriented to place, Disoriented to situation, Disoriented to time(turns to his name while seated)  Cognition: Decreased command following, Decreased attention/concentration(follows 25% 1 step familiar commands)  Safety/Judgement: Decreased insight into deficits, Decreased awareness of need for safety, Decreased awareness of need for assistance, Decreased awareness of environment, Fall prevention  Functional Mobility Training:  Bed Mobility:  Rolling: Maximum assistance  Supine to Sit: Total assistance;Assist x2  Sit to Supine: Total assistance;Assist x2  Scooting: Total assistance;Assist x2        Transfers:  Sit to Stand:  Total assistance;Assist x2  Stand to Sit: Minimum assistance;Assist x2        Bed to Chair: (unable)                    Balance:  Sitting: Impaired  Sitting - Static: Poor (constant support)  Sitting - Dynamic: Poor (constant support)  Standing: Impaired  Standing - Static: Poor;Constant support  Ambulation/Gait Training:         unable Pain Rating:  Painful right hand and elbow noted. Activity Tolerance:   Fair  Please refer to the flowsheet for vital signs taken during this treatment.     After treatment patient left in no apparent distress:   Supine in bed, Call bell within reach, Side rails x 3, and telesitter     COMMUNICATION/COLLABORATION:   The patients plan of care was discussed with: Occupational Therapist and Registered Nurse    Ryan Merida, PT   Time Calculation: 19 mins

## 2019-11-21 NOTE — PROGRESS NOTES
Oncology End of Shift Note      Bedside shift change report given to Nayeli Belcher RN (incoming nurse) by Emmanuel Urrutia (outgoing nurse) on [facility]. Report included the following information SBAR, Kardex and MAR. Shift Summary:   Assumed care of Patient at 5:10pm, report received from [facility]. No complaints of pain. Patient resting quietly and fed dinner by tech. Patient vitals assessed and meds given. Hourly rounding completed and patient turned. Issues for Physician to Address:       Patient on Cardiac Monitoring?     [] Yes  [x] No    Rhythm:          Shift Events        Emmanuel Urrutia

## 2019-11-21 NOTE — PROGRESS NOTES
I have reviewed pertinent labs and imaging, discussed/agreed on the plan of care with Dr. Bubba Stewart. Hospitalist Progress Note    NAME: Laura Valero   :  1945   MRN:  [id number]       Interim Hospital Summary: 66 y.o. male whom presented on 2019  with past medical history of seizure disorder, chronic encephalopathy with underlying schizophrenia presented to emergency department due to change in his mentation as it was reported at the nursing facility. There was no family at bedside or caregiver and all history was obtained from EMR and ER doctor. They report that when the patient got here they called code stroke however he did not have any neurologic deficits and he was evaluated by tele-neurologist which he recommended to admit the patient for inpatient EEG. Assessment / Plan: Plan to transfer to [facility]  Acute encephalopathy likely metabolic due to seizures and delirium  -Per family, patient has some underlying baseline confusion  -19 Head CTCT IMPRESSION: No acute intracranial abnormality. Possible acute sinusitis involving the right frontal sinus and ethmoidal air cells  -Ammonia level is within normal limits. Recent urine analysis normal.   -19 MRI Brain IMPRESSION: No acute intracranial hemorrhage or infarct.  -Conitnue depakote and vimpat per neurology recommendation  -On Ativan PRN for seizures.   -Continue Seizure precautions.   -Continue Fall precautions.  -Continue dysphagia diet  -CM working on transfer to [place]. -Patient's MPOA niece is asking transfer to [place] due to need for schizophrenia medication adjustments as they might be interfering with his seizures.   His mental health care provider is over at the [place].       Acute kidney injury likely prerenal based on KDIGO guidelines (more than 0.3 mg/dl increase)  -Creatinine improved with hydration and is normal now  -BMP in am        Acute sinusitis  -CT shows evidence of acute sinusitis  -Augmentin completed 11/19/19       Hypertension  -Continue home Norvasc and metoprolol. Hold lisinopril       Gouty arthritis  -Continue allopurinol      History of schizophrenia  -On paliperidone IM q. Monthly  -now on haldol prn for agitation        Disposition:  Waiting on transfer to [facility]        25.0 - 29.9 Overweight / Body mass index is 26.13 kg/m². Code status: Full  Prophylaxis: Hep SQ  Recommended Disposition: [facility]     Subjective:     Chief Complaint / Reason for Physician Visit  [facility] \" I feel ok today, I ate lunch\". Discussed with RN events overnight. Review of Systems:  Symptom Y/N Comments  Symptom Y/N Comments   Fever/Chills    Chest Pain     Poor Appetite N Ate 75% of lunch tray  Edema     Cough    Abdominal Pain     Sputum    Joint Pain     SOB/WILSON    Pruritis/Rash     Nausea/vomit    Tolerating PT/OT     Diarrhea    Tolerating Diet Y    Constipation    Other       Could NOT obtain due to: confusion     Objective:     VITALS:   Last 24hrs VS reviewed since prior progress note. Most recent are:  Patient Vitals for the past 24 hrs:   Temp Pulse Resp BP SpO2   11/20/19 1942 98.6 °F (37 °C) 71 18 111/63 96 %   11/20/19 1841  84  156/74    11/20/19 1555 97.9 °F (36.6 °C) 68 18 121/68 96 %   11/20/19 0745 97.6 °F (36.4 °C) 73 16 158/78 98 %   11/19/19 2334 97.7 °F (36.5 °C) 75 16 112/66 95 %     No intake or output data in the 24 hours ending 11/20/19 2144     PHYSICAL EXAM:  General: Alert, cooperative, confused, no acute distress    EENT:  EOMI. Anicteric sclerae. MMM  Resp:  CTA bilaterally, no wheezing or rales. No accessory muscle use  CV:  RRR,  No edema  GI:  Soft, Non distended, Non tender. +Bowel sounds  Neurologic:  Alert and oriented to self, slow but normal speech,   Psych:   Not anxious nor agitated  Skin:  No rashes.   No jaundice    Reviewed most current lab test results and cultures  YES  Reviewed most current radiology test results   YES  Review and summation of old records today    NO  Reviewed patient's current orders and MAR    YES  PMH/SH reviewed - no change compared to H&P  ________________________________________________________________________  Care Plan discussed with:    Comments   Patient [address] [place] [address]     Consultant                       Y Multidiciplinary team rounds were held today with , nursing, pharmacist and clinical coordinator. Patient's plan of care was discussed; medications were reviewed and discharge planning was addressed. ________________________________________________________________________   based      Comments   >50% of visit spent in counseling and coordination of care     ________________________________________________________________________  Ervin Marshall NP     Procedures: see electronic medical records for all procedures/Xrays and details which were not copied into this note but were reviewed prior to creation of Plan. LABS:  I reviewed today's most current labs and imaging studies. Pertinent labs include:  No results for input(s): WBC, HGB, HCT, PLT, HGBEXT, HCTEXT, PLTEXT in the last 72 hours. No results for input(s): NA, K, CL, CO2, GLU, BUN, CREA, CA, MG, PHOS, ALB, TBIL, TBILI, SGOT, ALT, INR, INREXT in the last 72 hours.     Signed: )Ervin Marshall NP

## 2019-11-21 NOTE — INTERDISCIPLINARY ROUNDS
Oncology Interdisciplinary rounds were held today to discuss patient plan of care and outcomes. The following members were present: Nursing, Physician, Case Management, Pharmacy, and PT/OT Actual Length of Stay: 7 DRG GLOS: 4.3 Expected Length of Stay: 4d 7h 
 
 
 
               Plan            Discharge PT/OT Waiting on [facility]

## 2019-11-21 NOTE — PROGRESS NOTES
Problem: Seizure Disorder (Adult)  Goal: *STG: Remains free of seizure activity  Outcome: Progressing Towards Goal  Goal: *STG: Maintains lab values within therapeutic range  Outcome: Progressing Towards Goal  Goal: *STG/LTG: Complies with medication therapy  Outcome: Progressing Towards Goal  Goal: *STG: Remains free of injury during seizure activity  Outcome: Progressing Towards Goal  Goal: *STG: Remains safe in hospital  Outcome: Progressing Towards Goal  Goal: Interventions  Outcome: Progressing Towards Goal     Problem: Seizure Disorder (Adult)  Goal: *STG: Maintains lab values within therapeutic range  Outcome: Progressing Towards Goal     Problem: Seizure Disorder (Adult)  Goal: *STG/LTG: Complies with medication therapy  Outcome: Progressing Towards Goal     Problem: Seizure Disorder (Adult)  Goal: *STG: Remains free of injury during seizure activity  Outcome: Progressing Towards Goal     Problem: Seizure Disorder (Adult)  Goal: *STG: Remains safe in hospital  Outcome: Progressing Towards Goal     Problem: Seizure Disorder (Adult)  Goal: Interventions  Outcome: Progressing Towards Goal     Problem: Falls - Risk of  Goal: *Absence of Falls  Outcome: Progressing Towards Goal  Note: Fall Risk Interventions:  Mobility Interventions: Communicate number of staff needed for ambulation/transfer    Mentation Interventions: Adequate sleep, hydration, pain control    Medication Interventions: Evaluate medications/consider consulting pharmacy    Elimination Interventions: Call light in reach    History of Falls Interventions: Evaluate medications/consider consulting pharmacy

## 2019-11-21 NOTE — PROGRESS NOTES
Oncology End of Shift Note      Bedside shift change report given to Anai Gaffney RN (incoming nurse) by Liane Eckert (outgoing nurse) on Siddhartha Couch. Report included the following information SBAR, Kardex and MAR. Shift Summary: assumed patient care at 0100. Patient resting quietly entire shift, until bed side report when RN attempted to check skin with second RN. Patient begin yelling and threatening staff.            Liane Eckert

## 2019-11-22 LAB
GLUCOSE BLD STRIP.AUTO-MCNC: 153 MG/DL (ref 65–100)
SERVICE CMNT-IMP: ABNORMAL

## 2019-11-22 PROCEDURE — 65270000015 HC RM PRIVATE ONCOLOGY

## 2019-11-22 PROCEDURE — 97530 THERAPEUTIC ACTIVITIES: CPT

## 2019-11-22 PROCEDURE — 74011250636 HC RX REV CODE- 250/636: Performed by: INTERNAL MEDICINE

## 2019-11-22 PROCEDURE — 82962 GLUCOSE BLOOD TEST: CPT

## 2019-11-22 PROCEDURE — 74011250637 HC RX REV CODE- 250/637: Performed by: INTERNAL MEDICINE

## 2019-11-22 PROCEDURE — 97535 SELF CARE MNGMENT TRAINING: CPT

## 2019-11-22 PROCEDURE — 94760 N-INVAS EAR/PLS OXIMETRY 1: CPT

## 2019-11-22 RX ORDER — METOPROLOL TARTRATE 5 MG/5ML
5 INJECTION INTRAVENOUS
Status: DISCONTINUED | OUTPATIENT
Start: 2019-11-22 | End: 2019-11-27

## 2019-11-22 RX ADMIN — ASPIRIN 325 MG: 325 TABLET, FILM COATED ORAL at 09:44

## 2019-11-22 RX ADMIN — SODIUM CHLORIDE 1000 ML: 900 INJECTION, SOLUTION INTRAVENOUS at 17:00

## 2019-11-22 RX ADMIN — HEPARIN SODIUM 5000 UNITS: 5000 INJECTION INTRAVENOUS; SUBCUTANEOUS at 21:23

## 2019-11-22 RX ADMIN — ALLOPURINOL 100 MG: 100 TABLET ORAL at 09:44

## 2019-11-22 RX ADMIN — AMLODIPINE BESYLATE 10 MG: 5 TABLET ORAL at 09:44

## 2019-11-22 RX ADMIN — LACOSAMIDE 100 MG: 100 TABLET, FILM COATED ORAL at 09:45

## 2019-11-22 RX ADMIN — HEPARIN SODIUM 5000 UNITS: 5000 INJECTION INTRAVENOUS; SUBCUTANEOUS at 15:26

## 2019-11-22 RX ADMIN — DIVALPROEX SODIUM 500 MG: 250 TABLET, EXTENDED RELEASE ORAL at 09:44

## 2019-11-22 RX ADMIN — LACOSAMIDE 100 MG: 100 TABLET, FILM COATED ORAL at 21:23

## 2019-11-22 RX ADMIN — METOPROLOL TARTRATE 50 MG: 50 TABLET, FILM COATED ORAL at 09:45

## 2019-11-22 NOTE — PROGRESS NOTES
Spiritual Care Assessment/Progress Note  [facility]      NAME: Dorina Chaudhari      MRN: [id number]  AGE: 66 y.o. SEX: male  Temple Affiliation: Unknown   Language: English     11/22/2019     Total Time (in minutes): 15     Spiritual Assessment begun in [facility] through conversation with:         [x]Patient        [] Family    [] Friend(s)        Reason for Consult: Crisis     Spiritual beliefs: (Please include comment if needed)     [] Identifies with a pop tradition:         [] Supported by a pop community:            [] Claims no spiritual orientation:           [] Seeking spiritual identity:                [] Adheres to an individual form of spirituality:           [x] Not able to assess:  Due to pt's condition                         Identified resources for coping:      [] Prayer                               [] Music                  [] Guided Imagery     [] Family/friends                 [] Pet visits     [] Devotional reading                         [x] Unknown     [] Other                                            Interventions offered during this visit: (See comments for more details)    Patient Interventions: Prayer (assurance of)           Plan of Care:     [] Support spiritual and/or cultural needs    [] Support AMD and/or advance care planning process      [] Support grieving process   [] Coordinate Rites and/or Rituals    [] Coordination with community clergy   [] No spiritual needs identified at this time   [] Detailed Plan of Care below (See Comments)  [] Make referral to Music Therapy  [] Make referral to Pet Therapy     [] Make referral to Addiction services  [] Make referral to [facility]  [] Make referral to Spiritual Care Partner  [] No future visits requested        [x] Follow up visits as needed     Comments: Responded to RRT for pt. Staff was present at bedside. Unable to fully assess pt at this time due to his condition.   No visitors or family at bedside. Offered words of comfort to patient and assurance of prayers. Chaplains are available for support as needed.     Jazmin Kenney, [address] [place] [address] Road

## 2019-11-22 NOTE — PROGRESS NOTES
Problem: Self Care Deficits Care Plan (Adult)  Goal: *Acute Goals and Plan of Care (Insert Text)  Description    FUNCTIONAL STATUS PRIOR TO ADMISSION: Unable to obtain this session as Pt is not reliable historian. Says he uses a cane and RW yet unsure as to accuracy of statement. HOME SUPPORT: Per chart, pt resides at nursing home or group home, yet unclear. Will update as more information is gleaned. Occupational Therapy Goals  Initiated 11/15/2019 Goals reviewed 11-22, no progress continue x 1 week, may need to decrease service  1. Patient will perform self-feeding with supervision/set-up within 7 day(s). 2.  Patient will perform grooming tasks sitting unsupported at EOB with supervision/set-up within 7 day(s). 3.  Patient will perform upper body dressing with supervision/set-up within 7 day(s). 4.  Patient will perform lower body dressing with minimal assistance within 7 days. 5.  Patient will perform toilet transfers with CGA using least restrictive device within 7 day(s). 6.  Patient will perform all aspects of toileting with minimal assistance within 7 day(s). Outcome: Not Progressing Towards Goal     Problem: Patient Education: Go to Patient Education Activity  Goal: Patient/Family Education  Outcome: Not Progressing Towards Goal   OCCUPATIONAL THERAPY TREATMENT/WEEKLY RE-EVALUATION  Patient: Kadie Pimentel (1 y.o. male)  Date: 11/22/2019  Diagnosis: Seizure ([facility] [address] [R56.9]   <principal problem not specified>       Precautions: Fall, Bed Alarm  Chart, occupational therapy assessment, plan of care, and goals were reviewed. ASSESSMENT  Based on the objective data described below, D self care and functional mobility; Drooling lightly from L side of mouth/not noted yesterday. Very limited command following, eye contact and verbalization this session.     Current Level of Function Impacting Discharge (ADLs): D x 2 self care and functional mobility    Other factors to consider for discharge: would need 24/7 full D care if discharged to home         PLAN :  Goals have been updated based on progression since last assessment. Patient continues to benefit from skilled intervention to address the above impairments. Continue to follow patient 3 times a week to address goals. Recommend with staff: sit up 30 minutes post weal, supported with head of bed at ~ near 90 degrees for aspiration precautions; noted to be drooling some out L side of mouth    Recommend next OT session: good for A x 2 persons minimum    Recommendation for discharge: (in order for the patient to meet his/her long term goals)  Therapy up to 5 days/week in SNF setting    This discharge recommendation:  Has not yet been discussed the attending provider and/or case management    Equipment recommendations for successful discharge (if) home: hospital bed, mechanical lift, and wheelchair       SUBJECTIVE:   Patient stated I hurt.  (my knees)    OBJECTIVE DATA SUMMARY:   Cognitive/Behavioral Status:  Neurologic State: Drowsy; Confused; Eyes open to voice; Eyes open to stimulus  Orientation Level: Oriented to person  Cognition: Impaired decision making;Decreased command following     Perseveration: Perseverates during ADLS(once hand grasped on command, did not release hand of OTR)       Functional Mobility and Transfers for ADLs:  Bed Mobility:  Rolling: Additional time;Assist x2;Total assistance  Supine to Sit: Total assistance;Assist x2; Additional time  Sit to Supine: Total assistance;Assist x2; Additional time  Scooting: (unable today)    Transfers:  Sit to Stand: (unable today)  Functional Transfers  Toilet Transfer : Total assistance;Assist x2(inferred; not safe at this time)       Balance:  Sitting: Impaired; With support(max A, heavy posterior lean/pushing backwards in sitting)    ADL Intervention:  Feeding  Feeding Assistance: Total assistance (dependent)(note mod/max A yesterday)    Grooming  Grooming Assistance:  Total assistance(dependent)    Upper Body Bathing  Bathing Assistance: Total assistance(dependent)    Lower Body Bathing  Bathing Assistance: Total assistance(dependent)    Upper Body Dressing Assistance  Dressing Assistance: Total assistance(dependent)    Lower Body Dressing Assistance  Dressing Assistance: Total assistance(dependent)    Toileting  Toileting Assistance: Total assistance(dependent)    Cognitive Retraining  Attention to Task: Single task  Maintains Attention For (Time): (less than 30 seconds)  Following Commands: Follows one step commands/directions(very limited; 3 x this session)    Pain:  \"I hurt. \"  \"My knees. \"    Activity Tolerance:   Poor and requires frequent rest breaks  Please refer to the flowsheet for vital signs taken during this treatment.     After treatment patient left in no apparent distress:   Supine in bed, Call bell within reach, Side rails x 3, and safety camera/sitter in room     COMMUNICATION/COLLABORATION:   The patients plan of care was discussed with: Physical Therapist and Registered Nurse    Palomo Valdez OTR/L  Time Calculation: 26 mins

## 2019-11-22 NOTE — PROGRESS NOTES
Oncology End of Shift Note      Bedside shift change report given to [facility], RN (incoming nurse) by Mckenzie Taylor RN (outgoing nurse) on Lew Repress. Report included the following information SBAR. Shift Summary: Safety, meds, meals,skin care,repositioning      Issues for Physician to Address:       Patient on Cardiac Monitoring?     [] Yes  [x] No    Rhythm:                  Melanie Lopez RN

## 2019-11-22 NOTE — INTERDISCIPLINARY ROUNDS
Oncology Interdisciplinary rounds were held today to discuss patient plan of care and outcomes. The following members were present: Nursing, Physician, Case Management, Pharmacy, and PT/OT Actual Length of Stay: 8 DRG GLOS: 4.3 Expected Length of Stay: 4d 7h 
 
 
 
               Plan            Discharge Recalled psy for notes to be placed in the chart. Discharge date:TBA

## 2019-11-22 NOTE — PROGRESS NOTES
EDIN:   1) Psych note   2) Send updated notes to the [place]   3) Pt will need 2ND IM letter   4) Pt will need a UAI completed prior to discharge     5:00 PM- CM spoke with pt's niece [facility] updating her on the EDIN. CM informed [facility] we are still waiting on psych's note to send to the [place]. CM inquired about the long term plans. [facility] states he will probably need to go to a nursing home and she has started to apply for Medicaid and is meeting with someone on Monday to assist. CM will need to complete a UAI for pt. CM also inquired about code status. Pt's niece would like to make pt a DDNR. CM inquired about palliative consult, nijeimy agreed this would be appropriate. Nijeimy is bringing up AMD to place on the chart (states her and her Dad are MPOA and can make these decisions). RN informed. Once updated notes are available CM will fax to the [place] to reconsider for transfer. CM will continue to follow pt and remain available for support. Care Management Interventions  PCP Verified by CM: Yes([facility])  Palliative Care Criteria Met (RRAT>21 & CHF Dx)?: No  Mode of Transport at Discharge: BLS  Transition of Care Consult (CM Consult): Other([facility])  Discharge Durable Medical Equipment: No  Health Maintenance Reviewed: Yes  Physical Therapy Consult: Yes  Occupational Therapy Consult: Yes  Speech Therapy Consult: Yes  Current Support Network:  Other(Lives at [facility])  Confirm Follow Up Transport: Other (see comment)([facility])  Plan discussed with Pt/Family/Caregiver: Yes  Discharge Location  Discharge Placement: [address], [place], [address]   [phone]

## 2019-11-22 NOTE — PROGRESS NOTES
1700 Responded to Rapid Response call for this patient and several low blood pressures. Primary nurse made many attempts to contact attending MD however was unsuccessful prior to calling Rapid. Nurse reports systolic BP in the 55'H. Now 99/54, patient is alert but not following commands. Patient has been less responsive over the past few days according to nurse and chart. Dr. Radames Urban called during Rapid Response and gave orders for NS bolus and D/C medications. Following his call Dr. Paulette Solorio responded to previous calls. Dr. Tod Guidry also stopped by on her way out and assessed patient. All in agreement with the NS bolus and d/cing BP medications. Patient stayed in room.

## 2019-11-22 NOTE — PROGRESS NOTES
Hospitalist Progress Note    NAME: Rah Nunez   :  1945   MRN:  [id number]       Assessment / Plan:  Acute encephalopathy  Etiology multifactorial at baseline now waiting for bed placement at the [facility]. seizure disorder continue Keppra All workup unremarkable . SÁNCHEZ stable last bmp cr 1.04 . Continue to monitor closely     Seizure stable no repeat episode, continue depakote    HTN monitor closely bp tend to run a little on the low side the patient does not move much , remains more in bed . Sinusitis stable no symptoms augmentin completed. Gouty arthrtis stable     Continue current regimen, awaiting bed availability , will consult Voxel (Internap) med for goals of care . I was called by nurse rapid response for low blood , Blood pressure was upper 80's  Repeat bp about 99 systolic, patient given fluid bolus,   / Body mass index is 26.13 kg/m². Code status: full  Prophylaxis: Heparin  Recommended Disposition: [facility]     Subjective:     Chief Complaint / Reason for Physician Visit    Discussed with RN events overnight. Pt seen and evaluated at bedside no issues     Review of Systems:  Symptom Y/N Comments  Symptom Y/N Comments   Fever/Chills n   Chest Pain     Poor Appetite n   Edema     Cough n   Abdominal Pain     Sputum n   Joint Pain     SOB/WILSON nn   Pruritis/Rash     Nausea/vomit n   Tolerating PT/OT     Diarrhea n   Tolerating Diet     Constipation    Other       Could NOT obtain due to:      Objective:     VITALS:   Last 24hrs VS reviewed since prior progress note.  Most recent are:  Patient Vitals for the past 24 hrs:   Temp Pulse Resp BP SpO2   19 0809  70  136/81    19 0733 98 °F (36.7 °C) (!) 56 16 91/58 91 %   19 1933 98.1 °F (36.7 °C) 71 18 142/62 93 %   19 1540 97.7 °F (36.5 °C) 65 18 143/68 93 %       Intake/Output Summary (Last 24 hours) at 2019 1235  Last data filed at 2019 1007  Gross per 24 hour   Intake 300 ml   Output    Net 300 ml PHYSICAL EXAM:  General: WD, WN. Alert, cooperative, no acute distress    EENT:  EOMI. Anicteric sclerae. MMM  Resp:  CTA bilaterally, no wheezing or rales. No accessory muscle use  CV:  Regular  rhythm,  No edema  GI:  Soft, Non distended, Non tender.  +Bowel sounds  Neurologic:  Alert and oriented X 3, normal speech,       Reviewed most current lab test results and cultures  YES  Reviewed most current radiology test results   YES  Review and summation of old records today    NO  Reviewed patient's current orders and MAR    YES  PMH/SH reviewed - no change compared to H&P  ________________________________________________________________________  Care Plan discussed with:    Comments   Patient     Family      RN     Care Manager     Consultant                        Multidiciplinary team rounds were held today with , nursing, pharmacist and clinical coordinator. Patient's plan of care was discussed; medications were reviewed and discharge planning was addressed. ________________________________________________________________________      Total CRITICAL CARE TIME Spent:   Minutes non procedure based      Comments   >50% of visit spent in counseling and coordination of care     ________________________________________________________________________  Daron Rodriguez MD     Procedures: see electronic medical records for all procedures/Xrays and details which were not copied into this note but were reviewed prior to creation of Plan. LABS:  I reviewed today's most current labs and imaging studies. Pertinent labs include:  No results for input(s): WBC, HGB, HCT, PLT, HGBEXT, HCTEXT, PLTEXT in the last 72 hours.   Recent Labs     11/21/19  0004      K 4.1      CO2 26   GLU 95   BUN 41*   CREA 1.04   CA 8.3*       Signed: Daron Rodriguez MD

## 2019-11-22 NOTE — PROGRESS NOTES
Problem: Mobility Impaired (Adult and Pediatric)  Goal: *Acute Goals and Plan of Care (Insert Text)  Description  FUNCTIONAL STATUS PRIOR TO ADMISSION: patient very confused and unable to provide PLOF. Patient reports possibly ambulating with SPC vs RW. HOME SUPPORT PRIOR TO ADMISSION: The patient lives at SNF per chart. Patient unable to provide information. Physical Therapy Goals  Goals reassessed 18/51/1327, remain applicable and appropriate, continue for additional week  Initiated 11/15/2019  1. Patient will move from supine to sit and sit to supine , scoot up and down and roll side to side in bed with supervision/set-up within 7 day(s). 2.  Patient will transfer from bed to chair and chair to bed with minimal assistance/contact guard assist using the least restrictive device within 7 day(s). 3.  Patient will perform sit to stand with minimal assistance/contact guard assist within 7 day(s). 4.  Patient will ambulate with minimal assistance/contact guard assist for 50 feet with the least restrictive device within 7 day(s). Outcome: Progressing Towards Goal   PHYSICAL THERAPY TREATMENT: WEEKLY REASSESSMENT  Patient: Landry Membreno (22 y.o. male)  Date: 11/22/2019  Primary Diagnosis: Seizure [facility]) [R56.9]        Precautions: Fall, Bed Alarm      ASSESSMENT  Patient continues with skilled PT services and is not progressing towards goals. Patient with increased lethargy and limited responses this date. Tolerated sitting EOB but required total A. Noted drooling while patient sitting EOB (which OT reports not present last visit). Patient with c/o pain while sitting with total A for balance. Unable to attempt standing this visit. Will continue to follow. Patient's progression toward goals since last assessment: Progress limited due to alertness and cognitive presentation     Current Level of Function Impacting Discharge (mobility/balance):  Total A for bed mobility, unable to stand/transfer PLAN :  Goals have been updated based on progression since last assessment. Patient continues to benefit from skilled intervention to address the above impairments. Recommendations and Planned Interventions: bed mobility training, transfer training, gait training, therapeutic exercises, patient and family training/education, and therapeutic activities      Frequency/Duration: Patient will be followed by physical therapy:  3 times a week to address goals. Recommendation for discharge: (in order for the patient to meet his/her long term goals)  Therapy up to 5 days/week in SNF setting    This discharge recommendation:  Has been made in collaboration with the attending provider and/or case management    IF patient discharges home will need the following DME: to be determined (TBD)     SUBJECTIVE:   Patient with limited responses this date. OBJECTIVE DATA SUMMARY:   HISTORY:    Past Medical History:   Diagnosis Date    CAD (coronary artery disease)     Gout     Hypertension     Schizophrenia ([facility] [address]     [facility]    History reviewed. No pertinent surgical history. EXAMINATION/PRESENTATION/DECISION MAKING:   Critical Behavior:  Neurologic State: Drowsy, Confused, Eyes open to voice, Eyes open to stimulus  Orientation Level: Oriented to person  Cognition: Impaired decision making, Decreased command following  Safety/Judgement: Decreased insight into deficits, Decreased awareness of need for safety, Decreased awareness of need for assistance, Decreased awareness of environment, Fall prevention  Hearing: Auditory  Auditory Impairment: None  Range Of Motion:   Generally decreased, functional                        Strength:     Generally decreased, functional      Functional Mobility:  Bed Mobility:  Rolling: Additional time;Assist x2;Total assistance  Supine to Sit: Total assistance;Assist x2; Additional time  Sit to Supine: Total assistance;Assist x2; Additional time  Scooting: (unable today)  Transfers:  Sit to Stand: (unable today)     Balance:   Sitting: Impaired;Poor; With support(max A, heavy posterior lean/pushing backwards in sitting)    Pain Rating:  C/o pain with all activity     Activity Tolerance:   Fair    After treatment patient left in no apparent distress:   Supine in bed and Call bell within reach    COMMUNICATION/EDUCATION:   The patients plan of care was discussed with: Occupational Therapist and Registered Nurse. Fall prevention education was provided and the patient/caregiver indicated understanding., Patient/family have participated as able in goal setting and plan of care. , and Patient/family agree to work toward stated goals and plan of care.     Thank you for this referral.  Kody Bhatti, PT, DPT   Time Calculation: 13 mins

## 2019-11-22 NOTE — PROGRESS NOTES
Oncology End of Shift Note      Bedside shift change report given to [facility], RN (incoming nurse) by Tk Rousseau (outgoing nurse) on Rebecca Beam. Report included the following information SBAR, Kardex and MAR. Shift Summary:   Patient tolerated care fairly. Asked MD to assess patient mentation in the beginning of shift. Patient turned throughout shift and medications given and education provided. Patient fed all meals by tech. Attempted to call MD Anguiano multiple times regarding the patients blood pressure at the end of the shift. Called the nursing supervisor, the ER doctors extension, rapid response extension. Called a Rapid response to received orders regarding patients blood pressure. Patient received a bolus and assessed by rapid response nurse. Palliative consult ordered. Issues for Physician to Address:       Patient on Cardiac Monitoring?     [] Yes  [x] No    Rhythm:          Shift Events        Tk Rousseau

## 2019-11-23 LAB
ANION GAP SERPL CALC-SCNC: 6 MMOL/L (ref 5–15)
BASOPHILS # BLD: 0 K/UL (ref 0–0.1)
BASOPHILS NFR BLD: 0 % (ref 0–1)
BUN SERPL-MCNC: 51 MG/DL (ref 6–20)
BUN/CREAT SERPL: 44 (ref 12–20)
CALCIUM SERPL-MCNC: 8.1 MG/DL (ref 8.5–10.1)
CHLORIDE SERPL-SCNC: 106 MMOL/L (ref 97–108)
CO2 SERPL-SCNC: 24 MMOL/L (ref 21–32)
CREAT SERPL-MCNC: 1.17 MG/DL (ref 0.7–1.3)
DIFFERENTIAL METHOD BLD: ABNORMAL
EOSINOPHIL # BLD: 0.2 K/UL (ref 0–0.4)
EOSINOPHIL NFR BLD: 2 % (ref 0–7)
ERYTHROCYTE [DISTWIDTH] IN BLOOD BY AUTOMATED COUNT: 14 % (ref 11.5–14.5)
GLUCOSE SERPL-MCNC: 102 MG/DL (ref 65–100)
HCT VFR BLD AUTO: 36.1 % (ref 36.6–50.3)
HGB BLD-MCNC: 11.8 G/DL (ref 12.1–17)
IMM GRANULOCYTES # BLD AUTO: 0.1 K/UL (ref 0–0.04)
IMM GRANULOCYTES NFR BLD AUTO: 1 % (ref 0–0.5)
LYMPHOCYTES # BLD: 0.9 K/UL (ref 0.8–3.5)
LYMPHOCYTES NFR BLD: 10 % (ref 12–49)
MCH RBC QN AUTO: 31.5 PG (ref 26–34)
MCHC RBC AUTO-ENTMCNC: 32.7 G/DL (ref 30–36.5)
MCV RBC AUTO: 96.3 FL (ref 80–99)
MONOCYTES # BLD: 1 K/UL (ref 0–1)
MONOCYTES NFR BLD: 11 % (ref 5–13)
NEUTS SEG # BLD: 7 K/UL (ref 1.8–8)
NEUTS SEG NFR BLD: 76 % (ref 32–75)
NRBC # BLD: 0 K/UL (ref 0–0.01)
NRBC BLD-RTO: 0 PER 100 WBC
PLATELET # BLD AUTO: 202 K/UL (ref 150–400)
PMV BLD AUTO: 10.2 FL (ref 8.9–12.9)
POTASSIUM SERPL-SCNC: 4.5 MMOL/L (ref 3.5–5.1)
RBC # BLD AUTO: 3.75 M/UL (ref 4.1–5.7)
RBC MORPH BLD: ABNORMAL
SODIUM SERPL-SCNC: 136 MMOL/L (ref 136–145)
WBC # BLD AUTO: 9.2 K/UL (ref 4.1–11.1)

## 2019-11-23 PROCEDURE — 65270000015 HC RM PRIVATE ONCOLOGY

## 2019-11-23 PROCEDURE — 74011250637 HC RX REV CODE- 250/637: Performed by: INTERNAL MEDICINE

## 2019-11-23 PROCEDURE — 74011250636 HC RX REV CODE- 250/636: Performed by: INTERNAL MEDICINE

## 2019-11-23 PROCEDURE — 80048 BASIC METABOLIC PNL TOTAL CA: CPT

## 2019-11-23 PROCEDURE — 85025 COMPLETE CBC W/AUTO DIFF WBC: CPT

## 2019-11-23 PROCEDURE — 36415 COLL VENOUS BLD VENIPUNCTURE: CPT

## 2019-11-23 RX ADMIN — DIVALPROEX SODIUM 500 MG: 250 TABLET, EXTENDED RELEASE ORAL at 09:38

## 2019-11-23 RX ADMIN — HEPARIN SODIUM 5000 UNITS: 5000 INJECTION INTRAVENOUS; SUBCUTANEOUS at 21:05

## 2019-11-23 RX ADMIN — LACOSAMIDE 100 MG: 100 TABLET, FILM COATED ORAL at 21:04

## 2019-11-23 RX ADMIN — LACOSAMIDE 100 MG: 100 TABLET, FILM COATED ORAL at 09:38

## 2019-11-23 RX ADMIN — ALLOPURINOL 100 MG: 100 TABLET ORAL at 09:38

## 2019-11-23 RX ADMIN — HALOPERIDOL LACTATE 2 MG: 5 INJECTION, SOLUTION INTRAMUSCULAR at 22:23

## 2019-11-23 RX ADMIN — HEPARIN SODIUM 5000 UNITS: 5000 INJECTION INTRAVENOUS; SUBCUTANEOUS at 06:47

## 2019-11-23 RX ADMIN — ASPIRIN 325 MG: 325 TABLET, FILM COATED ORAL at 09:38

## 2019-11-23 NOTE — PROGRESS NOTES
Hospitalist Progress Note    NAME: Lucy Gustafson   :  1945   MRN:  [id number]       Assessment / Plan:    Acute encephalopathy  Etiology multifactorial at baseline now waiting for bed placement at the [facility]. seizure disorder continue Keppra All workup unremarkable . repeated blood work stable      SÁNCHEZ stable last bmp cr 1.17 . Continue to monitor closely      Seizure stable no repeat episode, continue depakote     HTN monitor closely bp tend to run a little on the low side the patient does not move much , remains more in bed .        Sinusitis stable no symptoms augmentin completed.     Gouty arthrtis stable    / Body mass index is 26.13 kg/m². Code status: full  Prophylaxis: Heparin  Recommended Disposition: [facility]     Subjective:     Chief Complaint / Reason for Physician Visit   Discussed with RN events overnight. Patient seen and examined at bedside, comfortable no complains, no events overnight     Review of Systems:  Symptom Y/N Comments  Symptom Y/N Comments   Fever/Chills n   Chest Pain n    Poor Appetite n   Edema n    Cough n   Abdominal Pain n    Sputum n   Joint Pain n    SOB/WILSON n   Pruritis/Rash n    Nausea/vomit n   Tolerating PT/OT     Diarrhea n   Tolerating Diet     Constipation    Other       Could NOT obtain due to:      Objective:     VITALS:   Last 24hrs VS reviewed since prior progress note. Most recent are:  Patient Vitals for the past 24 hrs:   Temp Pulse Resp BP SpO2   19 0726 98.4 °F (36.9 °C) 68 16 119/54 93 %   19 0058 98.6 °F (37 °C) 92 18 150/78 92 %   19 2000 99.1 °F (37.3 °C) 90 16 146/85 96 %   19 1805  72  112/59    19 1657  66 16 99/54 100 %   19 1618 98.5 °F (36.9 °C) 68 16 (!) 89/54 96 %       Intake/Output Summary (Last 24 hours) at 2019 0945  Last data filed at 2019 1007  Gross per 24 hour   Intake 300 ml   Output    Net 300 ml        PHYSICAL EXAM:  General: WD, WN.  Alert, cooperative, no acute distress    EENT:  EOMI. Anicteric sclerae. MMM  Resp:  CTA bilaterally, no wheezing or rales. No accessory muscle use  CV:  Regular  rhythm,  No edema  GI:  Soft, Non distended, Non tender.  +Bowel sounds  Neurologic:  Alert and oriented X 3, normal speech,       Reviewed most current lab test results and cultures  YES  Reviewed most current radiology test results   YES  Review and summation of old records today    NO  Reviewed patient's current orders and MAR    YES  PMH/SH reviewed - no change compared to H&P  ________________________________________________________________________  Care Plan discussed with:    Comments   Patient     Family      RN     Care Manager     Consultant                        Multidiciplinary team rounds were held today with , nursing, pharmacist and clinical coordinator. Patient's plan of care was discussed; medications were reviewed and discharge planning was addressed. ________________________________________________________________________      Total CRITICAL CARE TIME Spent:   Minutes non procedure based      Comments   >50% of visit spent in counseling and coordination of care     ________________________________________________________________________  Siddhartha Reina MD     Procedures: see electronic medical records for all procedures/Xrays and details which were not copied into this note but were reviewed prior to creation of Plan. LABS:  I reviewed today's most current labs and imaging studies.   Pertinent labs include:  Recent Labs     11/23/19 0133   WBC 9.2   HGB 11.8*   HCT 36.1*        Recent Labs     11/23/19 0133 11/21/19  0004    138   K 4.5 4.1    107   CO2 24 26   * 95   BUN 51* 41*   CREA 1.17 1.04   CA 8.1* 8.3*       Signed: Siddhartha Reina MD

## 2019-11-23 NOTE — PROGRESS NOTES
RAPID RESPONSE FOLLOW UP    Rounded on patient d/t recent rapid response on 11/22. Spoke with primary nurse. Has no acute concerns. VSS. Patient Vitals for the past 12 hrs:   Temp Pulse Resp BP SpO2   11/23/19 0726 98.4 °F (36.9 °C) 68 16 119/54 93 %   11/23/19 0058 98.6 °F (37 °C) 92 18 150/78 92 %      No rapid response interventions indicated.       Amanda Hassan RN  RRT, [id number]

## 2019-11-23 NOTE — PROGRESS NOTES
RAPID RESPONSE TEAM- Follow Up    Rounded on patient due to recent rapid response. Discussed with primary RN, [address] No acute concerns, VSS. Patient Vitals for the past 8 hrs:   Temp Pulse Resp BP SpO2   11/22/19 1805  72  112/59    11/22/19 1657  66 16 99/54 100 %   11/22/19 1618 98.5 °F (36.9 °C) 68 16 (!) 89/54 96 %           No RRT interventions indicated at this time. Please call with any questions or concerns.      Renae Bergeron  Rapid Response RN  Tong Flynn

## 2019-11-24 PROCEDURE — 74011250637 HC RX REV CODE- 250/637: Performed by: INTERNAL MEDICINE

## 2019-11-24 PROCEDURE — 74011250636 HC RX REV CODE- 250/636: Performed by: INTERNAL MEDICINE

## 2019-11-24 PROCEDURE — 65270000015 HC RM PRIVATE ONCOLOGY

## 2019-11-24 RX ADMIN — ALLOPURINOL 100 MG: 100 TABLET ORAL at 09:36

## 2019-11-24 RX ADMIN — HEPARIN SODIUM 5000 UNITS: 5000 INJECTION INTRAVENOUS; SUBCUTANEOUS at 06:00

## 2019-11-24 RX ADMIN — ACETAMINOPHEN 650 MG: 325 TABLET ORAL at 20:43

## 2019-11-24 RX ADMIN — ASPIRIN 325 MG: 325 TABLET, FILM COATED ORAL at 09:36

## 2019-11-24 RX ADMIN — HEPARIN SODIUM 5000 UNITS: 5000 INJECTION INTRAVENOUS; SUBCUTANEOUS at 13:53

## 2019-11-24 RX ADMIN — DIVALPROEX SODIUM 500 MG: 250 TABLET, EXTENDED RELEASE ORAL at 09:36

## 2019-11-24 RX ADMIN — LACOSAMIDE 100 MG: 100 TABLET, FILM COATED ORAL at 20:43

## 2019-11-24 RX ADMIN — HEPARIN SODIUM 5000 UNITS: 5000 INJECTION INTRAVENOUS; SUBCUTANEOUS at 21:29

## 2019-11-24 RX ADMIN — LACOSAMIDE 100 MG: 100 TABLET, FILM COATED ORAL at 09:36

## 2019-11-24 RX ADMIN — HALOPERIDOL LACTATE 2 MG: 5 INJECTION, SOLUTION INTRAMUSCULAR at 23:23

## 2019-11-24 NOTE — PROGRESS NOTES
Oncology End of Shift Note      Bedside shift change report given to [facility], RN (incoming nurse) by Lizeth Lazaro (outgoing nurse) on [employer]. Report included the following information SBAR, Kardex and MAR. Shift Summary: Patient received all morning medications. Patient slept throughout the shift. No significant changes in patient condition. Issues for Physician to Address:  None. Patient on Cardiac Monitoring?     [] Yes  [x] No    Rhythm:          Lizeth Lazaro

## 2019-11-24 NOTE — PROGRESS NOTES
Oncology End of Shift Note      Bedside shift change report given to [facility] MIGUEL ANGEL, RN (incoming nurse) by Lesia Hodgkin (outgoing nurse) on [facility]. Report included the following information SBAR, Kardex and MAR. Shift Summary: pt received haldol due to hallucinations and pulling out IV. Patient begin resting quietly after haldol dose.          Issues for Physician to Address:          Lesia Hodgkin

## 2019-11-24 NOTE — PROGRESS NOTES
Oncology End of Shift Note      Bedside shift change report given to Kimberly Matos RN (incoming nurse) by Nahed Galindo RN (outgoing nurse) on Adah Junes. Report included the following information SBAR, Kardex and MAR. Shift Summary:   Patient calm and cooperative through out shift. All scheduled medications given, incontinence care provided, patient turned and repositioned. Feeding assistance needed. No PRN medications needed. Hourly rounding completed. Issues for Physician to Address:       Patient on Cardiac Monitoring?     [] Yes  [x] No    Rhythm:          Shift Events        Nahed Galindo RN

## 2019-11-24 NOTE — PROGRESS NOTES
RAPID RESPONSE FOLLOW UP    Rounded on patient d/t recent rapid response on 11/22. Spoke with primary nurse. Has no acute concerns. VSS. Visit Vitals  /70 (BP 1 Location: Right arm, BP Patient Position: At rest)   Pulse 67   Temp 97.8 °F (36.6 °C)   Resp 16   Ht 5' 10\" (1.778 m)   Wt 82.6 kg (182 lb 1.6 oz)   SpO2 96%   BMI 26.13 kg/m²      No rapid response interventions indicated.       Amarjit De Los Santos, RN  RRT, X. [phone]

## 2019-11-24 NOTE — PROGRESS NOTES
RAPID RESPONSE TEAM- Follow Up    Rounded on patient due to recent rapid response. Discussed with primary RN, Valerio Eisenmenger. No acute concerns, VSS, MEWS 1. Patient Vitals for the past 8 hrs:   Temp Pulse Resp BP SpO2   11/23/19 2030 98.1 °F (36.7 °C) 92 18 155/84 96 %   11/23/19 1544 97.9 °F (36.6 °C) (!) 57 18 126/71 92 %           No RRT interventions indicated at this time. Please call with any questions or concerns.      Partha Max  Rapid Response RN  Alfonso Ortiz

## 2019-11-24 NOTE — PROGRESS NOTES
Continued Stay Note  [facility]     Patient Name: Nell Montez  MRN: [id number]  Today's Date: 11/15/2019    Admit Date: 11/11/2019    Discharge Plan     Row Name 11/15/19 0907       Plan    Plan  Home with family    Patient/Family in Agreement with Plan  yes    Plan Comments  Pt. voiced no dc needs at this time. Plan is to dc to home with family. Last dose of this round of chemo is 11/16. Will cont. to follow and update.     Final Discharge Disposition Code  01 - home or self-care        Discharge Codes    No documentation.       Expected Discharge Date and Time     Expected Discharge Date Expected Discharge Time    Nov 15, 2019             Constance Goncalves RN     Hospitalist Progress Note    NAME: Santiago Blackburn   :  1945   MRN:  [id number]       Assessment / Plan:  Acute encephalopathy  Etiology multifactorial at baseline now waiting for bed placement at the [facility]. seizure disorder continue Keppra All workup unremarkable . repeated blood work stable      SÁNCHEZ stable last bmp cr 1.17 . Continue to monitor closely      Seizure stable no repeat episode, continue depakote     HTN monitor closely bp tend to run a little on the low side the patient does not move much , remains more in bed .        Sinusitis stable no symptoms augmentin completed. Body mass index is 26.13 kg/m². Code status: full  Prophylaxis: Heparin  Recommended Disposition: [facility]     Subjective:     Chief Complaint / Reason for Physician Visit  Discussed with RN events overnight. Patient seen and examined at bedside says right knee was huring, no swelling     Review of Systems:  Symptom Y/N Comments  Symptom Y/N Comments   Fever/Chills n   Chest Pain n    Poor Appetite n   Edema n    Cough n   Abdominal Pain nn    Sputum n   Joint Pain     SOB/WILSON n   Pruritis/Rash     Nausea/vomit n   Tolerating PT/OT     Diarrhea n   Tolerating Diet     Constipation    Other       Could NOT obtain due to:      Objective:     VITALS:   Last 24hrs VS reviewed since prior progress note. Most recent are:  Patient Vitals for the past 24 hrs:   Temp Pulse Resp BP SpO2   19 2030 98.1 °F (36.7 °C) 92 18 155/84 96 %   19 1544 97.9 °F (36.6 °C) (!) 57 18 126/71 92 %     No intake or output data in the 24 hours ending 19 0832     PHYSICAL EXAM:  General: WD, WN. Alert, cooperative, no acute distress    EENT:  EOMI. Anicteric sclerae. MMM  Resp:  CTA bilaterally, no wheezing or rales. No accessory muscle use  CV:  Regular  rhythm,  No edema  GI:  Soft, Non distended, Non tender.  +Bowel sounds  Neurologic:  Alert and oriented X 3, normal speech,   Skin:  No rashes.   No jaundice    Reviewed most current lab test results and cultures  YES  Reviewed most current radiology test results   YES  Review and summation of old records today    NO  Reviewed patient's current orders and MAR    YES  PMH/SH reviewed - no change compared to H&P  ________________________________________________________________________  Care Plan discussed with:    Comments   Patient     Family      RN     Care Manager     Consultant                        Multidiciplinary team rounds were held today with , nursing, pharmacist and clinical coordinator. Patient's plan of care was discussed; medications were reviewed and discharge planning was addressed. ________________________________________________________________________            Comments   >50% of visit spent in counseling and coordination of care     ________________________________________________________________________  Nichelle Johnson MD     Procedures: see electronic medical records for all procedures/Xrays and details which were not copied into this note but were reviewed prior to creation of Plan. LABS:  I reviewed today's most current labs and imaging studies.   Pertinent labs include:  Recent Labs     11/23/19  0133   WBC 9.2   HGB 11.8*   HCT 36.1*        Recent Labs     11/23/19  0133      K 4.5      CO2 24   *   BUN 51*   CREA 1.17   CA 8.1*       Signed: Nichelle Johnson MD

## 2019-11-25 LAB
ANION GAP SERPL CALC-SCNC: 4 MMOL/L (ref 5–15)
BUN SERPL-MCNC: 32 MG/DL (ref 6–20)
BUN/CREAT SERPL: 39 (ref 12–20)
CALCIUM SERPL-MCNC: 8.4 MG/DL (ref 8.5–10.1)
CHLORIDE SERPL-SCNC: 107 MMOL/L (ref 97–108)
CO2 SERPL-SCNC: 28 MMOL/L (ref 21–32)
CREAT SERPL-MCNC: 0.82 MG/DL (ref 0.7–1.3)
ERYTHROCYTE [DISTWIDTH] IN BLOOD BY AUTOMATED COUNT: 13.8 % (ref 11.5–14.5)
GLUCOSE SERPL-MCNC: 81 MG/DL (ref 65–100)
HCT VFR BLD AUTO: 32.1 % (ref 36.6–50.3)
HGB BLD-MCNC: 10.5 G/DL (ref 12.1–17)
MCH RBC QN AUTO: 31.3 PG (ref 26–34)
MCHC RBC AUTO-ENTMCNC: 32.7 G/DL (ref 30–36.5)
MCV RBC AUTO: 95.5 FL (ref 80–99)
NRBC # BLD: 0 K/UL (ref 0–0.01)
NRBC BLD-RTO: 0 PER 100 WBC
PLATELET # BLD AUTO: 213 K/UL (ref 150–400)
PMV BLD AUTO: 10.2 FL (ref 8.9–12.9)
POTASSIUM SERPL-SCNC: 4.2 MMOL/L (ref 3.5–5.1)
RBC # BLD AUTO: 3.36 M/UL (ref 4.1–5.7)
SODIUM SERPL-SCNC: 139 MMOL/L (ref 136–145)
WBC # BLD AUTO: 6.2 K/UL (ref 4.1–11.1)

## 2019-11-25 PROCEDURE — 36415 COLL VENOUS BLD VENIPUNCTURE: CPT

## 2019-11-25 PROCEDURE — 74011250637 HC RX REV CODE- 250/637: Performed by: INTERNAL MEDICINE

## 2019-11-25 PROCEDURE — 74011250636 HC RX REV CODE- 250/636: Performed by: INTERNAL MEDICINE

## 2019-11-25 PROCEDURE — 80048 BASIC METABOLIC PNL TOTAL CA: CPT

## 2019-11-25 PROCEDURE — 97530 THERAPEUTIC ACTIVITIES: CPT

## 2019-11-25 PROCEDURE — 97535 SELF CARE MNGMENT TRAINING: CPT

## 2019-11-25 PROCEDURE — 85027 COMPLETE CBC AUTOMATED: CPT

## 2019-11-25 PROCEDURE — 65270000015 HC RM PRIVATE ONCOLOGY

## 2019-11-25 RX ORDER — SODIUM CHLORIDE 0.9 % (FLUSH) 0.9 %
SYRINGE (ML) INJECTION
Status: COMPLETED
Start: 2019-11-25 | End: 2019-11-25

## 2019-11-25 RX ADMIN — ASPIRIN 325 MG: 325 TABLET, FILM COATED ORAL at 09:18

## 2019-11-25 RX ADMIN — HALOPERIDOL LACTATE 2 MG: 5 INJECTION, SOLUTION INTRAMUSCULAR at 20:40

## 2019-11-25 RX ADMIN — LACOSAMIDE 100 MG: 100 TABLET, FILM COATED ORAL at 09:18

## 2019-11-25 RX ADMIN — HEPARIN SODIUM 5000 UNITS: 5000 INJECTION INTRAVENOUS; SUBCUTANEOUS at 13:40

## 2019-11-25 RX ADMIN — HEPARIN SODIUM 5000 UNITS: 5000 INJECTION INTRAVENOUS; SUBCUTANEOUS at 05:29

## 2019-11-25 RX ADMIN — LACOSAMIDE 100 MG: 100 TABLET, FILM COATED ORAL at 20:43

## 2019-11-25 RX ADMIN — ALLOPURINOL 100 MG: 100 TABLET ORAL at 09:18

## 2019-11-25 RX ADMIN — Medication 10 ML: at 20:45

## 2019-11-25 RX ADMIN — ACETAMINOPHEN 650 MG: 325 TABLET ORAL at 20:43

## 2019-11-25 RX ADMIN — HEPARIN SODIUM 5000 UNITS: 5000 INJECTION INTRAVENOUS; SUBCUTANEOUS at 23:24

## 2019-11-25 RX ADMIN — DIVALPROEX SODIUM 500 MG: 250 TABLET, EXTENDED RELEASE ORAL at 09:17

## 2019-11-25 NOTE — PROGRESS NOTES
EDIN:   1) Home with 24/7 caregivers vs. [facility]   2) Complete [facility] per niece request     5:00 PM- CM spoke with Rima Hope with the [place]- who states at this time pt is not appropriate for a transfer. They will re-evaluate once the new psych notes are available. 3:50 PM- CM spoke with Rima Hope at the [place] () regarding updates. CM faxed updated clinical information including the note from psych that states th Rima Hope is requesting a full assessment from psychiatric consult. CM spoke with Eddie the NP who initially saw pt on the 19th, requested an additional consult. NP stated she would not be able to come until tomorrow evening. CM informed attending. 2:50 PM- CM printed off paperwork to send updates to the [place]. CM reviewed psychiatric note and noted they did not assess the patient and signed off. CM informing supervisors and RN's are re consulting as this consult is needed to assist with EDIN.      Verónica Carlos, MSW, [address] [facility]   [phone]

## 2019-11-25 NOTE — PROGRESS NOTES
Oncology End of Shift Note      Bedside shift change report given to DANNA Waddell (incoming nurse) by Tamiko Read RN (outgoing nurse) on Katherine Runner. Report included the following information SBAR, Kardex and MAR. Shift Summary:   Patient tolerated care well through out shift. All scheduled medications given. During interdisciplinary rounds, team discussed next steps with patient and niece (see Care management notes). PT worked with patient, was able to be in chair position. Patient able to be redirected when getting restless. Patient ate all meals. Hourly rounding completed. Issues for Physician to Address:       Patient on Cardiac Monitoring?     [] Yes  [x] No    Rhythm:          Shift Events        Tamiko Read RN

## 2019-11-25 NOTE — PROGRESS NOTES
Hospitalist Progress Note    NAME: Sydnee Sal   :  1945   MRN:  [id number]       Assessment / Plan:  Acute encephalopathy  Etiology multifactorial at baseline now waiting for bed placement at the [facility]. seizure disorder continue Keppra All workup unremarkable . repeated blood work stable      SÁNCHEZ stable last bmp cr 1.17 . Continue to monitor closely      Seizure stable no repeat episode, continue depakote     HTN monitor closely bp tend to run a little on the low side the patient does not move much , remains more in bed .     Continue to wait for bed availability      Body mass index is 26.13 kg/m². Code status: full  Prophylaxis: Heparin  Recommended Disposition: [facility]     Subjective:     Chief Complaint / Reason for Physician Visit    Discussed with RN events overnight. Patient seen and examined at bedside no events over night, comfortable . Review of Systems:  Symptom Y/N Comments  Symptom Y/N Comments   Fever/Chills n   Chest Pain n    Poor Appetite n   Edema n    Cough n   Abdominal Pain n    Sputum n   Joint Pain n    SOB/WILSON n   Pruritis/Rash n    Nausea/vomit n   Tolerating PT/OT     Diarrhea n   Tolerating Diet     Constipation    Other       Could NOT obtain due to:      Objective:     VITALS:   Last 24hrs VS reviewed since prior progress note. Most recent are:  Patient Vitals for the past 24 hrs:   Temp Pulse Resp BP SpO2   19 0800 97.5 °F (36.4 °C) 65 18 162/79 97 %   19 0330 97.3 °F (36.3 °C) 69 16 151/82 97 %   19 2017 98 °F (36.7 °C) 95 16 114/69 94 %   19 1500 97.5 °F (36.4 °C) 87 20 141/78 93 %       Intake/Output Summary (Last 24 hours) at 2019 1127  Last data filed at 2019 1733  Gross per 24 hour   Intake 150 ml   Output    Net 150 ml        PHYSICAL EXAM:  General: WD, WN. Alert, cooperative, no acute distress    EENT:  EOMI. Anicteric sclerae. MMM  Resp:  CTA bilaterally, no wheezing or rales.   No accessory muscle use  CV:  Regular  rhythm,  No edema  GI:  Soft, Non distended, Non tender.  +Bowel sounds  Neurologic:  Alert and oriented X 3, normal speech,   Psych:   Confused   Skin:  No rashes. No jaundice    Reviewed most current lab test results and cultures  YES  Reviewed most current radiology test results   YES  Review and summation of old records today    NO  Reviewed patient's current orders and MAR    YES  PMH/SH reviewed - no change compared to H&P  ________________________________________________________________________  Care Plan discussed with:    Comments   Patient     Family      RN     Care Manager     Consultant                        Multidiciplinary team rounds were held today with , nursing, pharmacist and clinical coordinator. Patient's plan of care was discussed; medications were reviewed and discharge planning was addressed. ________________________________________________________________________            Comments   >50% of visit spent in counseling and coordination of care     ________________________________________________________________________  Ariana Greene MD     Procedures: see electronic medical records for all procedures/Xrays and details which were not copied into this note but were reviewed prior to creation of Plan. LABS:  I reviewed today's most current labs and imaging studies.   Pertinent labs include:  Recent Labs     11/25/19 0340 11/23/19 0133   WBC 6.2 9.2   HGB 10.5* 11.8*   HCT 32.1* 36.1*    202     Recent Labs     11/25/19 0340 11/23/19 0133    136   K 4.2 4.5    106   CO2 28 24   GLU 81 102*   BUN 32* 51*   CREA 0.82 1.17   CA 8.4* 8.1*       Signed: Ariana Greene MD

## 2019-11-25 NOTE — PROGRESS NOTES
Problem: Self Care Deficits Care Plan (Adult)  Goal: *Acute Goals and Plan of Care (Insert Text)  Description    FUNCTIONAL STATUS PRIOR TO ADMISSION: Unable to obtain this session as Pt is not reliable historian. Says he uses a cane and RW yet unsure as to accuracy of statement. HOME SUPPORT: Per chart, pt resides at nursing home or group home, yet unclear. Will update as more information is gleaned. Occupational Therapy Goals  Initiated 11/15/2019 Goals reviewed 11-22, no progress continue x 1 week, may need to decrease service  1. Patient will perform self-feeding with supervision/set-up within 7 day(s). 2.  Patient will perform grooming tasks sitting unsupported at EOB with supervision/set-up within 7 day(s). 3.  Patient will perform upper body dressing with supervision/set-up within 7 day(s). 4.  Patient will perform lower body dressing with minimal assistance within 7 days. 5.  Patient will perform toilet transfers with CGA using least restrictive device within 7 day(s). 6.  Patient will perform all aspects of toileting with minimal assistance within 7 day(s). Outcome: Progressing Towards Goal   OCCUPATIONAL THERAPY TREATMENT  Patient: Andi Bence (59 y.o. male)  Date: 11/25/2019  Diagnosis: [facility] [R56.9]   <principal problem not specified>       Precautions: Fall, Bed Alarm  Chart, occupational therapy assessment, plan of care, and goals were reviewed. ASSESSMENT  Patient continues with skilled OT services and is slowly progressing towards goals. Patient total A x2 for supine > sit, sitting EOB with total A with limited righting reactions. Attempted to wash face however patient becoming more agitated, returned to supine. Recommend SNF/LTC. Current Level of Function Impacting Discharge (ADLs): Washing face max A, sitting EOB total A    Other factors to consider for discharge: Patient is requiring x 2 total A for functional mobility.            PLAN :  Patient continues to benefit from skilled intervention to address the above impairments. Continue treatment per established plan of care. to address goals. Recommend with staff: Rolling every two hours to reposition, encourage highest level of indep with feeding/grooming    Recommend next OT session: bed level ADL tasks to maximize participation    Recommendation for discharge: (in order for the patient to meet his/her long term goals)  Therapy up to 5 days/week in SNF setting    This discharge recommendation:  Has been made in collaboration with the attending provider and/or case management    IF patient discharges home will need the following DME: to be determined (TBD)       SUBJECTIVE:   Patient stated Let me down.     OBJECTIVE DATA SUMMARY:   Cognitive/Behavioral Status:  Neurologic State: Confused  Orientation Level: Oriented to person  Cognition: Follows commands             Functional Mobility and Transfers for ADLs:  Bed Mobility:  Rolling: Total assistance  Supine to Sit: Total assistance  Sit to Supine: Total assistance  Scooting: Total assistance    Transfers:       Balance:  Sitting: Impaired  Sitting - Static: Poor (constant support)  Sitting - Dynamic: Poor (constant support)    ADL Intervention:   Patient received resting in bed, easily awakened to verbal cues. Total A x2 for supine > sit and total A sitting EOB. Encouraged washing face which he initiated but did not fully complete. Limited righting reactions with sitting EOB. With additional time sitting patient with increased agitation. Assisted to supine with decreased agitation. Pain:  Did not c/o pain    Activity Tolerance:   Poor  Please refer to the flowsheet for vital signs taken during this treatment.     After treatment patient left in no apparent distress:   Supine in bed and Call bell within reach    COMMUNICATION/COLLABORATION:   The patients plan of care was discussed with: Physical Therapist and Registered Nurse    Therese Chavez, OT  Time Calculation: 10 mins

## 2019-11-25 NOTE — PROGRESS NOTES
Problem: Mobility Impaired (Adult and Pediatric)  Goal: *Acute Goals and Plan of Care (Insert Text)  Description  FUNCTIONAL STATUS PRIOR TO ADMISSION: patient very confused and unable to provide PLOF. Patient reports possibly ambulating with SPC vs RW. HOME SUPPORT PRIOR TO ADMISSION: The patient lives at SNF per chart. Patient unable to provide information. Physical Therapy Goals  Goals reassessed 96/67/7215, remain applicable and appropriate, continue for additional week  Initiated 11/15/2019  1. Patient will move from supine to sit and sit to supine , scoot up and down and roll side to side in bed with supervision/set-up within 7 day(s). 2.  Patient will transfer from bed to chair and chair to bed with minimal assistance/contact guard assist using the least restrictive device within 7 day(s). 3.  Patient will perform sit to stand with minimal assistance/contact guard assist within 7 day(s). 4.  Patient will ambulate with minimal assistance/contact guard assist for 50 feet with the least restrictive device within 7 day(s). Outcome: Progressing Towards Goal    PHYSICAL THERAPY TREATMENT  Patient: Sydnee Sal (39 y.o. male)  Date: 11/25/2019  Diagnosis: Seizure ([facility] [R56.9]   <principal problem not specified>       Precautions: Fall, Bed Alarm  Chart, physical therapy assessment, plan of care and goals were reviewed. ASSESSMENT  Patient continues with skilled PT services and is not progressing towards goals. He continues to require total assistance for all mobility and demonstrates significant trunk extension force while seated edge of bed. He does not follow commands for LE exercises and is unable to provide reliable insight into pain symptoms. He repeatedly calls out, \"Thank you, Hever Garcia.  Save me, Hermann,\" intermittently during treatment and consistently at rest.    Current Level of Function Impacting Discharge (mobility/balance): total assist    Other factors to consider for discharge: disorientation, poor command following         PLAN :  Patient continues to benefit from skilled intervention to address the above impairments. Continue treatment per established plan of care. to address goals. Recommendation for discharge: (in order for the patient to meet his/her long term goals)  Therapy up to 5 days/week in SNF setting    This discharge recommendation:  A follow-up discussion with the attending provider and/or case management is planned    IF patient discharges home will need the following DME: ambulance transport, hospital bed, 24-hour assistance, HHPT       SUBJECTIVE:   Patient stated I don't want to hurt you,\" during bed mobility and while cueing pt for anterior lean in sitting. Pt received supine, agreeable to PT and cleared by RN. OBJECTIVE DATA SUMMARY:   Critical Behavior:  Neurologic State: Confused  Orientation Level: Oriented to person  Cognition: Follows commands  Safety/Judgement: Decreased insight into deficits, Decreased awareness of need for safety, Decreased awareness of need for assistance, Decreased awareness of environment, Fall prevention  Functional Mobility Training:  Bed Mobility:  Rolling: Total assistance  Supine to Sit: Total assistance  Sit to Supine: Total assistance  Scooting: Total assistance     With bed in chair position pt performs trunk flexion to 90 degrees hip flexion with moderate assistance + rail use. 4 reps with 10 second hold. Transfers:      Infer total assistance. Recommend maxi-move/paul lift for transfers out of bed with attention to passive positioning. Balance:  Sitting: Impaired  Sitting - Static: Poor (constant support)  Sitting - Dynamic: Poor (constant support)  Significant posterior lean, total assistance for upright positioning. Resists attempt at rocking with UE facilitation and sheet to trunk, no improvement with various UE positioning or AROM reaching anteriorly.     Ambulation/Gait Training:            Pt unable                                                 Therapeutic Exercises:   Pt cued for ankle pumps, heel slides, SLR; Does not follow cues and requires total assistance for exercise performance - offers resistance. Pain Rating:  Denies pain    Activity Tolerance:   No pt complaints   Please refer to the flowsheet for vital signs taken during this treatment.     After treatment patient left in no apparent distress:   Call bell within reach, Bed / chair alarm activated, Side rails x 3, and bed in chair position     COMMUNICATION/COLLABORATION:   The patients plan of care was discussed with: Occupational Therapist, Registered Nurse, and Rehabilitation Attendant    Jaren Kendrick, PT, DPT   Time Calculation: 23 mins

## 2019-11-25 NOTE — CONSULTS
Palliative Medicine Consult  Tono: [phone] ([phone])    Patient Name: Soco Medellin  YOB: 1945    Date of Initial Consult: 11/22/2019  Reason for Consult: Care Decisions  Requesting Provider: Melida Hart MD  Primary Care Physician: Mandy Dinh MD     SUMMARY:   Soco Medellin is a 66 y.o. with a past history of CAD, HTN, Schizophrenia, and Seizures, who was admitted on 11/14/2019 from nursing home with a diagnosis of acute on chronic encephalopathy after a seizure- likely post ictal state. Current medical issues leading to Palliative Medicine involvement include: DNR discussion    Social:  Mr Casey Elizondo has no children and was never , his closest family are his brother Cristine Wallis and niece Sarah Gonzalez:   1. Goals of care  2. DNR discussion  3. Frailty  4. Debility        PLAN:   1. Met first with Mr Casey Elizondo- he was just finishing up his lunch with the help of the aide- he was in good spirits, and denies any discomfort. He does not talk much, but when I asked how he was doing, he replied \"You only do what you can do\"  2. I talked with his niece Sudhir Mac on the phone- she shared some of the frustrations she has been battling with since his admission 2 days ago- trying to get him back to the [place]. She also shared that she asked to talked to Palliative because she though he was declining rapidly, but since has realized that she was visiting shortly after he was given Haldol- so he was sedated. She shared that he has never been in this hospital, always at the [place], so the chance in staff, and environment is really taking a toll on him  3. Her and her dad, who are Mr Lynette Lagos, are clear that he would not want to have CPR performed on him at this stage on his life. They would like to get a DDNR signed before he leaves, so we will complete this when one of them are able to get to the hospital this week (they will call me when they are here)  4.  Otherwise, the plan is for him to be transferred and resume his previous level of care prior to this admission  5. At some point, they will be interested in hospice support, but he is not at that stage yet  6. Initial consult note routed to primary continuity provider and/or primary health care team members  7. Communicated plan of care with: Palliative IDTFide [facility] Team     GOALS OF CARE / TREATMENT PREFERENCES:     GOALS OF CARE:  Patient/Health Care Proxy Stated Goals: Rehabilitation    TREATMENT PREFERENCES:   Code Status: DNR    Advance Care Planning:  [] [facility] Interdisciplinary Team has updated the ACP Navigator with Health Care Decision Maker and Patient Capacity      Primary Decision Maker: Tatiannashankar Vickerst Fern     Secondary Decision Maker: Mendel Alva, [address] [place] [address] [phone]  No flowsheet data found. Medical Interventions: Limited additional interventions     Other Instructions: Other:    As far as possible, the palliative care team has discussed with patient / health care proxy about goals of care / treatment preferences for patient.      HISTORY:     History obtained from: chart, niece  CHIEF COMPLAINT: none    HPI/SUBJECTIVE:    The patient is:   [x] Verbal and participatory  [] Non-participatory due to:   Alert, limited conversation, pleasant    Clinical Pain Assessment (nonverbal scale for severity on nonverbal patients):   Clinical Pain Assessment  Severity: 0     Activity (Movement): Lying quietly, normal position    Duration: for how long has pt been experiencing pain (e.g., 2 days, 1 month, years)  Frequency: how often pain is an issue (e.g., several times per day, once every few days, constant)     FUNCTIONAL ASSESSMENT:     Palliative Performance Scale (PPS):  PPS: 40       PSYCHOSOCIAL/SPIRITUAL SCREENING:     Palliative IDT has assessed this patient for cultural preferences / practices and a referral made as appropriate to needs (Cultural Services, Patient Advocacy, Ethics, etc.)    Any spiritual / Congregational concerns:  [] Yes /  [x] No    Caregiver Burnout:  [] Yes /  [x] No /  [] No Caregiver Present      Anticipatory grief assessment:   [x] Normal  / [] Maladaptive       ESAS Anxiety: Anxiety: 0    ESAS Depression: Depression: 0        REVIEW OF SYSTEMS:     Positive and pertinent negative findings in ROS are noted above in HPI. The following systems were [x] reviewed / [] unable to be reviewed as noted in HPI  Other findings are noted below. Systems: constitutional, ears/nose/mouth/throat, respiratory, gastrointestinal, genitourinary, musculoskeletal, integumentary, neurologic, psychiatric, endocrine. Positive findings noted below. Modified ESAS Completed by: provider   Fatigue: 3     Depression: 0 Pain: 0   Anxiety: 0 Nausea: 0   Anorexia: 0 Dyspnea: 0           Stool Occurrence(s): 1        PHYSICAL EXAM:     From RN flowsheet:  Wt Readings from Last 3 Encounters:   11/14/19 182 lb 1.6 oz (82.6 kg)   11/06/19 184 lb 11.9 oz (83.8 kg)     Blood pressure 162/79, pulse 65, temperature 97.5 °F (36.4 °C), resp. rate 18, height 5' 10\" (1.778 m), weight 182 lb 1.6 oz (82.6 kg), SpO2 97 %.     Pain Scale 1: Behavioral Pain Scale (BPS)  Pain Intensity 1: 0     Pain Location 1: Arm           Last bowel movement, if known:     Constitutional: alert, oriented to person and place  Eyes: pupils equal, anicteric  ENMT: no nasal discharge, moist mucous membranes  Cardiovascular: regular rhythm, distal pulses intact  Respiratory: breathing not labored, symmetric  Gastrointestinal: soft non-tender, +bowel sounds  Musculoskeletal: no deformity, no tenderness to palpation  Skin: warm, dry  Neurologic: following commands, moving all extremities  Psychiatric: full affect, no hallucinations  Other:       HISTORY:     Active Problems:    Seiz[facility] ([facility] (11/14/2019)      Past Medical History:   Diagnosis Date    CAD (coronary artery disease)     Gout     Hypertension     Schizophrenia ([facility]     [facility] History reviewed. No pertinent surgical history. History reviewed. No pertinent family history. History reviewed, no pertinent family history. Social History     Tobacco Use    Smoking status: Former Smoker    Smokeless tobacco: Never Used   Substance Use Topics    Alcohol use: Not Currently     Frequency: Never     No Known Allergies   Current Facility-Administered Medications   Medication Dose Route Frequency    metoprolol (LOPRESSOR) injection 5 mg  5 mg IntraVENous Q6H PRN    lacosamide (VIMPAT) tablet 100 mg  100 mg Oral Q12H    divalproex ER (DEPAKOTE ER) 24 hour tablet 500 mg  500 mg Oral DAILY    heparin (porcine) injection 5,000 Units  5,000 Units SubCUTAneous Q8H    allopurinol (ZYLOPRIM) tablet 100 mg  100 mg Oral DAILY    haloperidol lactate (HALDOL) injection 2 mg  2 mg IntraVENous Q4H PRN    aspirin tablet 325 mg  325 mg Oral DAILY    acetaminophen (TYLENOL) tablet 650 mg  650 mg Oral Q4H PRN    Or    acetaminophen (TYLENOL) solution 650 mg  650 mg Per NG tube Q4H PRN    Or    acetaminophen (TYLENOL) suppository 650 mg  650 mg Rectal Q4H PRN          LAB AND IMAGING FINDINGS:     Lab Results   Component Value Date/Time    WBC 6.2 11/25/2019 03:40 AM    HGB 10.5 (L) 11/25/2019 03:40 AM    PLATELET 266 13/13/8597 03:40 AM     Lab Results   Component Value Date/Time    Sodium 139 11/25/2019 03:40 AM    Potassium 4.2 11/25/2019 03:40 AM    Chloride 107 11/25/2019 03:40 AM    CO2 28 11/25/2019 03:40 AM    BUN 32 (H) 11/25/2019 03:40 AM    Creatinine 0.82 11/25/2019 03:40 AM    Calcium 8.4 (L) 11/25/2019 03:40 AM      Lab Results   Component Value Date/Time    AST (SGOT) 24 11/14/2019 08:15 PM    Alk.  phosphatase 64 11/14/2019 08:15 PM    Protein, total 6.4 11/14/2019 08:15 PM    Albumin 2.8 (L) 11/14/2019 08:15 PM    Globulin 3.6 11/14/2019 08:15 PM     Lab Results   Component Value Date/Time    INR 1.0 11/14/2019 08:15 PM    Prothrombin time 10.5 11/14/2019 08:15 PM      No results found for: IRON, FE, TIBC, IBCT, PSAT, FERR   No results found for: PH, PCO2, PO2  No components found for: [place]   Lab Results   Component Value Date/Time     (H) 11/17/2019 02:20 AM                Total time:   Counseling / coordination time, spent as noted above:   > 50% counseling / coordination?:     Prolonged service was provided for  []30 min   []75 min in face to face time in the presence of the patient, spent as noted above. Time Start:   Time End:   Note: this can only be billed with 08153 (initial) or 60252 (follow up). If multiple start / stop times, list each separately.

## 2019-11-25 NOTE — PROGRESS NOTES
Bedside and verbal shift change report given to [facility] DANNA COLLIER (oncoming nurse) by Migdalia Roque RN (offgoing nurse). Report included the following information SBAR, Kardex, Intake/Output, Recent Results, Alarm Parameters  and Quality Measures.

## 2019-11-25 NOTE — PROGRESS NOTES
Problem: Seizure Disorder (Adult)  Goal: *STG: Remains free of seizure activity  Outcome: Progressing Towards Goal  Goal: *STG: Maintains lab values within therapeutic range  Outcome: Progressing Towards Goal  Goal: *STG/LTG: Complies with medication therapy  Outcome: Progressing Towards Goal  Goal: *STG: Remains free of injury during seizure activity  Outcome: Progressing Towards Goal  Goal: *STG: Remains safe in hospital  Outcome: Progressing Towards Goal  Goal: Interventions  Outcome: Progressing Towards Goal     Problem: Falls - Risk of  Goal: *Absence of Falls  Description  Document Twila Arce Fall Risk and appropriate interventions in the flowsheet. Outcome: Progressing Towards Goal  Note: Fall Risk Interventions:  Mobility Interventions: Bed/chair exit alarm, Communicate number of staff needed for ambulation/transfer, PT Consult for mobility concerns, PT Consult for assist device competence, Strengthening exercises (ROM-active/passive)    Mentation Interventions: Bed/chair exit alarm, More frequent rounding, Reorient patient, Room close to nurse's station, Toileting rounds, Update white board    Medication Interventions: Evaluate medications/consider consulting pharmacy, Patient to call before getting OOB, Teach patient to arise slowly    Elimination Interventions: Bed/chair exit alarm, Call light in reach, Patient to call for help with toileting needs, Stay With Me (per policy)    History of Falls Interventions: Bed/chair exit alarm, Door open when patient unattended, Evaluate medications/consider consulting pharmacy, Room close to nurse's station         Problem: Patient Education: Go to Patient Education Activity  Goal: Patient/Family Education  Outcome: Progressing Towards Goal     Problem: Pressure Injury - Risk of  Goal: *Prevention of pressure injury  Description  Document Sandeep Scale and appropriate interventions in the flowsheet.   Outcome: Progressing Towards Goal  Note: Pressure Injury Interventions:  Sensory Interventions: Assess need for specialty bed, Avoid rigorous massage over bony prominences, Check visual cues for pain, Discuss PT/OT consult with provider, Float heels, Keep linens dry and wrinkle-free, Maintain/enhance activity level, Minimize linen layers, Monitor skin under medical devices, Pressure redistribution bed/mattress (bed type), Turn and reposition approx.  every two hours (pillows and wedges if needed)    Moisture Interventions: Absorbent underpads, Check for incontinence Q2 hours and as needed, Limit adult briefs, Maintain skin hydration (lotion/cream), Minimize layers    Activity Interventions: Pressure redistribution bed/mattress(bed type), PT/OT evaluation    Mobility Interventions: HOB 30 degrees or less, Pressure redistribution bed/mattress (bed type), PT/OT evaluation    Nutrition Interventions: Document food/fluid/supplement intake    Friction and Shear Interventions: HOB 30 degrees or less, Lift sheet, Minimize layers, Lift team/patient mobility team                Problem: Patient Education: Go to Patient Education Activity  Goal: Patient/Family Education  Outcome: Progressing Towards Goal     Problem: Patient Education: Go to Patient Education Activity  Goal: Patient/Family Education  Outcome: Progressing Towards Goal     Problem: Patient Education: Go to Patient Education Activity  Goal: Patient/Family Education  Outcome: Progressing Towards Goal     Problem: Non-Violent Restraints  Goal: *Removal from restraints as soon as assessed to be safe  Outcome: Progressing Towards Goal  Goal: *No harm/injury to patient while restraints in use  Outcome: Progressing Towards Goal  Goal: *Patient's dignity will be maintained  Outcome: Progressing Towards Goal  Goal: *Patient Specific Goal (EDIT GOAL, INSERT TEXT)  Outcome: Progressing Towards Goal  Goal: Non-violent Restaints:Standard Interventions  Outcome: Progressing Towards Goal  Goal: Non-violent Restraints:Patient Interventions  Outcome: Progressing Towards Goal  Goal: Patient/Family Education  Outcome: Progressing Towards Goal

## 2019-11-26 PROBLEM — G40.909 SEIZURE DISORDER (HCC): Status: ACTIVE | Noted: 2019-11-14

## 2019-11-26 PROBLEM — I10 HTN (HYPERTENSION): Status: ACTIVE | Noted: 2019-11-26

## 2019-11-26 PROBLEM — F20.9 SCHIZOPHRENIA (HCC): Status: ACTIVE | Noted: 2019-11-26

## 2019-11-26 PROBLEM — N17.9 AKI (ACUTE KIDNEY INJURY) (HCC): Status: ACTIVE | Noted: 2019-11-26

## 2019-11-26 PROBLEM — G93.40 ACUTE ENCEPHALOPATHY: Status: ACTIVE | Noted: 2019-11-26

## 2019-11-26 PROCEDURE — 74011250637 HC RX REV CODE- 250/637: Performed by: INTERNAL MEDICINE

## 2019-11-26 PROCEDURE — 77030041247 HC PROTECTOR HEEL HEELMEDIX MDII -B

## 2019-11-26 PROCEDURE — 65270000015 HC RM PRIVATE ONCOLOGY

## 2019-11-26 PROCEDURE — 74011000250 HC RX REV CODE- 250: Performed by: HOSPITALIST

## 2019-11-26 PROCEDURE — 74011250636 HC RX REV CODE- 250/636: Performed by: HOSPITALIST

## 2019-11-26 PROCEDURE — 74011250636 HC RX REV CODE- 250/636: Performed by: INTERNAL MEDICINE

## 2019-11-26 RX ORDER — MELATONIN
2000 DAILY
COMMUNITY

## 2019-11-26 RX ORDER — LANOLIN ALCOHOL/MO/W.PET/CERES
1000 CREAM (GRAM) TOPICAL DAILY
COMMUNITY

## 2019-11-26 RX ORDER — SODIUM CHLORIDE 0.9 % (FLUSH) 0.9 %
5-40 SYRINGE (ML) INJECTION EVERY 8 HOURS
Status: DISCONTINUED | OUTPATIENT
Start: 2019-11-26 | End: 2019-12-03 | Stop reason: HOSPADM

## 2019-11-26 RX ORDER — ALLOPURINOL 300 MG/1
300 TABLET ORAL DAILY
COMMUNITY
End: 2019-12-03

## 2019-11-26 RX ORDER — SODIUM CHLORIDE 0.9 % (FLUSH) 0.9 %
5-40 SYRINGE (ML) INJECTION AS NEEDED
Status: DISCONTINUED | OUTPATIENT
Start: 2019-11-26 | End: 2019-12-03 | Stop reason: HOSPADM

## 2019-11-26 RX ADMIN — ALLOPURINOL 100 MG: 100 TABLET ORAL at 08:35

## 2019-11-26 RX ADMIN — LACOSAMIDE 100 MG: 100 TABLET, FILM COATED ORAL at 21:32

## 2019-11-26 RX ADMIN — ACETAMINOPHEN 650 MG: 325 TABLET ORAL at 21:32

## 2019-11-26 RX ADMIN — HEPARIN SODIUM 5000 UNITS: 5000 INJECTION INTRAVENOUS; SUBCUTANEOUS at 14:00

## 2019-11-26 RX ADMIN — ASPIRIN 325 MG: 325 TABLET, FILM COATED ORAL at 08:35

## 2019-11-26 RX ADMIN — DIVALPROEX SODIUM 500 MG: 250 TABLET, EXTENDED RELEASE ORAL at 08:34

## 2019-11-26 RX ADMIN — Medication 10 ML: at 05:45

## 2019-11-26 RX ADMIN — HEPARIN SODIUM 5000 UNITS: 5000 INJECTION INTRAVENOUS; SUBCUTANEOUS at 21:32

## 2019-11-26 RX ADMIN — WATER 10 MG: 1 INJECTION INTRAMUSCULAR; INTRAVENOUS; SUBCUTANEOUS at 21:32

## 2019-11-26 RX ADMIN — Medication 10 ML: at 14:00

## 2019-11-26 RX ADMIN — LACOSAMIDE 100 MG: 100 TABLET, FILM COATED ORAL at 09:00

## 2019-11-26 RX ADMIN — HEPARIN SODIUM 5000 UNITS: 5000 INJECTION INTRAVENOUS; SUBCUTANEOUS at 05:45

## 2019-11-26 RX ADMIN — HALOPERIDOL LACTATE 2 MG: 5 INJECTION, SOLUTION INTRAMUSCULAR at 08:35

## 2019-11-26 NOTE — INTERDISCIPLINARY ROUNDS
Oncology Interdisciplinary rounds were held today to discuss patient plan of care and outcomes. The following members were present: Nursing, Physician, Case Management, Pharmacy, and PT/OT Actual Length of Stay: 12 DRG GLOS: 4.3 Expected Length of Stay: 4d 7h 
 
 
 
               Plan            Discharge PT/OT Palliative care Waiting on psy notes. Home with 24/7 caregivers vs [facility] Discharge TBA

## 2019-11-26 NOTE — PROGRESS NOTES
I reviewed pertinent labs and imaging, and discussed /agreed on the plan of care with Dr. Dustin Pastrana. Hospitalist Progress Note    NAME: Jessica Mantilla   :  1945   MRN:  [id number]     History of Present Illness: The patient is 66years old  man with past medical history of seizure disorder, chronic encephalopathy with underlying schizophrenia presented to emergency department due to change in his mentation as it was reported at the nursing facility. There was no family at bedside or caregiver and all history was obtained from EMR and ER doctor. They report that when the patient got here they called code stroke however he did not have any neurologic deficits and he was evaluated by tele-neurologist which he recommended to admit the patient for inpatient EEG     Assessment / Plan:  Acute encephalopathy-back to baseline  · Multifactorial   · Hx of seizure disorder; discontinue Keppra and begin Vimpat  · Work-up unremarkable  · Awaiting placement at [facility]    SÁNCHEZ- resolved  · Cr 0.82    Seizure disorder  · Discontinue Keppra and start Vimpat  · Appreciate Neurology input    HTN  · PRN metoprolol IV    Schizophrenia/Delerium  · Appreciate psychiatry input; does not meet inpatient criteria- signed off 19  · Psychiatry re-consulted 2019    25.0 - 29.9 Overweight / Body mass index is 26.13 kg/m². Code status: DNR  Prophylaxis: None indicated  Recommended Disposition: SNF/LTC     Subjective:     Chief Complaint / Reason for Physician Visit  Patient confused. Oriented to self only. Discussed with RN events overnight.      Review of Systems:  Symptom Y/N Comments  Symptom Y/N Comments   Fever/Chills    Chest Pain     Poor Appetite    Edema     Cough    Abdominal Pain     Sputum    Joint Pain     SOB/WILSON    Pruritis/Rash     Nausea/vomit    Tolerating PT/OT     Diarrhea    Tolerating Diet     Constipation    Other       Could NOT obtain due to: Confusion     Objective:     VITALS: Last 24hrs VS reviewed since prior progress note. Most recent are:  Patient Vitals for the past 24 hrs:   Temp Pulse Resp BP SpO2   11/26/19 0800 97.2 °F (36.2 °C) 66 16 160/85 100 %   11/26/19 0329 96.7 °F (35.9 °C) 68 16 155/86 99 %   11/25/19 1933 98.1 °F (36.7 °C) 99 18 162/71 97 %   11/25/19 1602 97.4 °F (36.3 °C)  14 100/57 95 %       Intake/Output Summary (Last 24 hours) at 11/26/2019 1522  Last data filed at 11/26/2019 0549  Gross per 24 hour   Intake 450 ml   Output    Net 450 ml        PHYSICAL EXAM:  General: Pleasant elderly  male. NAd   EENT:  EOMI. Anicteric sclerae. MMM.  +[place]  Resp:  CTA bilaterally, no wheezing or rales. No accessory muscle use  CV:  Regular  rhythm,  No edema  GI:  Soft, Non distended, Non tender.  +Bowel sounds  Neurologic:  Alert and oriented to person, normal speech,   Psych:   Little to no insight. Not anxious nor agitated  Skin:  No rashes. No jaundice    Reviewed most current lab test results and cultures  YES  Reviewed most current radiology test results   YES  Review and summation of old records today    NO  Reviewed patient's current orders and MAR    YES  PMH/SH reviewed - no change compared to H&P  ________________________________________________________________________  Care Plan discussed with:    Comments   Patient x    Family      RN x    Care Manager x    Consultant  x Psych NP                     Multidiciplinary team rounds were held today with , nursing, pharmacist and clinical coordinator. Patient's plan of care was discussed; medications were reviewed and discharge planning was addressed.      ________________________________________________________________________  Total NON critical care TIME:  25   Minutes    Total CRITICAL CARE TIME Spent:   Minutes non procedure based      Comments   >50% of visit spent in counseling and coordination of care     ________________________________________________________________________  Chepe Cha KYLIE Deleon     Procedures: see electronic medical records for all procedures/Xrays and details which were not copied into this note but were reviewed prior to creation of Plan. LABS:  I reviewed today's most current labs and imaging studies.   Pertinent labs include:  Recent Labs     11/25/19  0340   WBC 6.2   HGB 10.5*   HCT 32.1*        Recent Labs     11/25/19  0340      K 4.2      CO2 28   GLU 81   BUN 32*   CREA 0.82   CA 8.4*       Signed: Radha Douglas NP

## 2019-11-26 NOTE — CONSULTS
PSYCHIATRIC RECONSULTATION NOTE:    REASON FOR CONSULT:    A psychiatric consultation was requested by Sheron Thakkar MD to evaluate or provide advice/opinion related to evaluating history of schizophrenia, on monthly Invega injections. ? whether this would be interfering with pt's anti-epileptic medications causing his mental status to be worse than baseline. HISTORY OF PRESENTING COMPLAINT:     Mr. Karolina Smith is a 66 y.o. WHITE [place]  male who is currently admitted to the medical floor at [facility] on 11/14/2019 for the treatment of seizure or stroke and the primary team called this consult to ensure his monthly injections of Juan C Falter are not interfering the anti-epileptic medications he takes. On assessment patient is sitting up in bed eating breakfast with a virtual sitter. Patient is reported to be hard of hearing. Patient reports that he is on the monthly Invega injections but does not recall how long he has been taking the injections but informs writer that he receives all his care at the [place]. Patient denies SI/HI/AVH. Patient has [address] that has provided some information to primary team,her concern is that the Invega injection is interfering with his seizure medications. REVIEW OF SYMPTOMS:  Patient denies appetite change, weight change, vision change, sleep change, fever, night sweats, headache, cough, shortness of breath, chest pain, palpitations, nausea,vomiting, diarrhea, dizziness, weakness, tremors. PAST MEDICAL HISTORY:  Please see History & Physical for details. Past Medical History:   Diagnosis Date    CAD (coronary artery disease)     Gout     Hypertension     [facility]     [facility]          ALLERGIES:  No Known Allergies    MEDICATIONS PRIOR TO ADMISSION:  Medications Prior to Admission   Medication Sig    ALLOPURINOL PO Take  by mouth.  amLODIPine (NORVASC) 10 mg tablet Take 10 mg by mouth daily.     hydrOXYzine HCl (ATARAX) 10 mg tablet Take 10 mg by mouth two (2) times a day.  loratadine (CLARITIN) 10 mg tablet Take 10 mg by mouth.  omega-3s/dha/epa/fish oil/D3 (VITAMIN-D + OMEGA-3 PO) Take  by mouth.  vitamin B complex (B COMPLEX-VITAMIN B12 PO) Take  by mouth.  lisinopril (PRINIVIL, ZESTRIL) 40 mg tablet Take 20 mg by mouth daily.  PALIPERIDONE PALMITATE IM by IntraMUSCular route. Pt gets this every 4 weeks for schizophrenia    divalproex ER (DEPAKOTE ER) 500 mg ER tablet Take 500 mg by mouth.  metoprolol tartrate (LOPRESSOR) 50 mg tablet Take 50 mg by mouth two (2) times a day.  aspirin (ASPIRIN) 325 mg tablet Take 325 mg by mouth daily.        CURRENT MEDICATIONS:    Current Facility-Administered Medications:     sodium chloride (NS) flush 5-40 mL, 5-40 mL, IntraVENous, Q8H, Anna Taylor MD, 10 mL at 11/26/19 0545    sodium chloride (NS) flush 5-40 mL, 5-40 mL, IntraVENous, PRN, Anna Taylor MD    metoprolol (LOPRESSOR) injection 5 mg, 5 mg, IntraVENous, Q6H PRN, Letty Murcia MD    lacosamide (VIMPAT) tablet 100 mg, 100 mg, Oral, Q12H, Koffi Gage MD, 100 mg at 11/26/19 0900    divalproex ER (DEPAKOTE ER) 24 hour tablet 500 mg, 500 mg, Oral, DAILY, Koffi Scott MD, 500 mg at 11/26/19 0834    heparin (porcine) injection 5,000 Units, 5,000 Units, SubCUTAneous, Q8H, China Farrell MD, 5,000 Units at 11/26/19 0545    allopurinol (ZYLOPRIM) tablet 100 mg, 100 mg, Oral, DAILY, Koffi Scott MD, 100 mg at 11/26/19 0835    haloperidol lactate (HALDOL) injection 2 mg, 2 mg, IntraVENous, Q4H PRN, China Farrell MD, 2 mg at 11/26/19 0835    aspirin tablet 325 mg, 325 mg, Oral, DAILY, Leighann Corral MD, 325 mg at 11/26/19 0835    acetaminophen (TYLENOL) tablet 650 mg, 650 mg, Oral, Q4H PRN, 650 mg at 11/25/19 2043 **OR** acetaminophen (TYLENOL) solution 650 mg, 650 mg, Per NG tube, Q4H PRN **OR** acetaminophen (TYLENOL) suppository 650 mg, 650 mg, Rectal, Q4H PRN, Aldamouk, Amer, MD     LAB RESULTS:  Lab Results   Component Value Date/Time    WBC 6.2 11/25/2019 03:40 AM    HGB 10.5 (L) 11/25/2019 03:40 AM    HCT 32.1 (L) 11/25/2019 03:40 AM    PLATELET 475 82/31/5288 03:40 AM    MCV 95.5 11/25/2019 03:40 AM      Lab Results   Component Value Date/Time    Sodium 139 11/25/2019 03:40 AM    Potassium 4.2 11/25/2019 03:40 AM    Chloride 107 11/25/2019 03:40 AM    CO2 28 11/25/2019 03:40 AM    Anion gap 4 (L) 11/25/2019 03:40 AM    Glucose 81 11/25/2019 03:40 AM    BUN 32 (H) 11/25/2019 03:40 AM    Creatinine 0.82 11/25/2019 03:40 AM    BUN/Creatinine ratio 39 (H) 11/25/2019 03:40 AM    GFR est AA >60 11/25/2019 03:40 AM    GFR est non-AA >60 11/25/2019 03:40 AM    Calcium 8.4 (L) 11/25/2019 03:40 AM    Bilirubin, total 0.4 11/14/2019 08:15 PM    AST (SGOT) 24 11/14/2019 08:15 PM    Alk. phosphatase 64 11/14/2019 08:15 PM    Protein, total 6.4 11/14/2019 08:15 PM    Albumin 2.8 (L) 11/14/2019 08:15 PM    Globulin 3.6 11/14/2019 08:15 PM    A-G Ratio 0.8 (L) 11/14/2019 08:15 PM    ALT (SGPT) 26 11/14/2019 08:15 PM        PAST PSYCHIATRIC HISTORY:  Per the chart view patient has diagnosis of Schizophrenia and is on [place] injections. Patient reports he receives all of his care at the [place]. SUBSTANCE ABUSE HISTORY:  Social History     Substance and Sexual Activity   Drug Use Never      Drug Screen Most Recent Result Date     No resulted procedures found. PSYCHOSOCIAL HISTORY:  Patient reports he doesn't live anywhere. Per chart review patient lives in [facility]   Patient reports he served in the [employer].     MENTAL STATUS EXAM:  General appearance:  Appropriate in [address] contact: Intermittent  Speech: Normal  Affect: Congruent  Mood: Unknown  Orientation: Alert and Oriented to self, patient believes he was at the [facility] and  repeats \"It really doesn't matter it won't make any difference\" when asked about year and season. Thought Process: Logical  Perception: Denies AVH/Paranoia   Thought Content: Denies SI/HI  Insight: Fair  Judgement: Fair  Cognition: Intact grossly  Impulse Control: Fair    ASSESSMENT AND PLAN/RECOMMENDATION:  Mely Batista meets criteria for a diagnosis of Schizophrenia. At this time I recommend/plan the followin. Please confirm how many doses of Invega the patient has received, if the patient has been on the Invega injection for several months along with same anti-epileptic medication and dosage and this is his first seizure it is highly unlikely that the medications are interacting. However if the patient has been seizure free and just began the administration of Invega it is likely the Depakote is increasing the levels of Invega and may be causing the notable side effect of rare seizures. 2. It is recommended that the patient be transferred to his primary team at the [facility] to resume treatment and identify any causes to the sudden seizure. At that time they can determine the next steps to prevent any more seizures. 3. Patient denies any auditory or visual hallucinations, thus the Invega appears to be efficacious. Psychiatry will sign off at this time. Please consult Psychiatry again for any concerns regarding the patient's mental health changes and/or management. Thank you for the opportunity to participate in the care of your patient.

## 2019-11-26 NOTE — PROGRESS NOTES
Pharmacy Medication Reconciliation     The patient's medication list was obtained from the [place] via the patient's niece, Evans Tony. Recommendations/Findings: The following amendments were made to the patient's active medication list on file at [address]:     1) Additions:       - Cyanocobalamin 1000mcg po daily    2) Deletions:        - Vitamin B complex       - Vitamin D/Omega 3     3) Changes:       - Divalproex ER 1000mg daily      - Hydroxyzine 10mg BID prn anxiety      - Paliperidone 78mg IM monthly         Pertinent Findings: n/a      Prior to Admission Medications   Prescriptions Last Dose Informant Patient Reported? Taking? PALIPERIDONE PALMITATE IM 2019 at Unknown time  Yes Yes   Si mg by IntraMUSCular route every month. Pt gets this every 4 weeks for schizophrenia    allopurinol (ZYLOPRIM) 300 mg tablet   Yes Yes   Sig: Take 300 mg by mouth daily. amLODIPine (NORVASC) 10 mg tablet 2019 at Unknown time  Yes Yes   Sig: Take 10 mg by mouth daily. aspirin (ASPIRIN) 325 mg tablet 2019 at Unknown time  Yes Yes   Sig: Take 325 mg by mouth daily. cholecalciferol (VITAMIN D3) (1000 Units /25 mcg) tablet   Yes Yes   Sig: Take 2,000 Units by mouth daily. 2 x 1000 units   cyanocobalamin (VITAMIN B12) 500 mcg tablet   Yes Yes   Sig: Take 1,000 mcg by mouth daily. divalproex ER (DEPAKOTE ER) 500 mg ER tablet 2019 at Unknown time  Yes Yes   Sig: Take 1,000 mg by mouth daily. hydrOXYzine HCl (ATARAX) 10 mg tablet 2019 at Unknown time  Yes Yes   Sig: Take 10 mg by mouth two (2) times daily as needed for Anxiety. lisinopril (PRINIVIL, ZESTRIL) 40 mg tablet 2019 at Unknown time  Yes Yes   Sig: Take 20 mg by mouth daily. 1/2 of 40mg tab   loratadine (CLARITIN) 10 mg tablet 2019 at Unknown time  Yes Yes   Sig: Take 10 mg by mouth daily.    metoprolol tartrate (LOPRESSOR) 50 mg tablet 2019 at Unknown time  Yes Yes   Sig: Take 50 mg by mouth two (2) times a day.      Facility-Administered Medications: None          Thank you,  Vale Santacruz RPH

## 2019-11-26 NOTE — PROGRESS NOTES
Pharmacy Medication Reconciliation     The patient's medication list was obtained from the [place] via the patient's niece, Velasquez Vallejo. Recommendations/Findings: The following amendments were made to the patient's active medication list on file at [facility]:     1) Additions:       - Cyanocobalamin 1000mcg po daily    2) Deletions:        - Vitamin B complex       - Vitamin D/Omega 3     3) Changes:       - Divalproex ER 1000mg daily      - Hydroxyzine 10mg BID prn anxiety      - Paliperidone 78mg IM monthly         Pertinent Findings: n/a      Prior to Admission Medications   Prescriptions Last Dose Informant Patient Reported? Taking? PALIPERIDONE PALMITATE IM 2019 at Unknown time  Yes Yes   Si mg by IntraMUSCular route every month. Pt gets this every 4 weeks for schizophrenia    allopurinol (ZYLOPRIM) 300 mg tablet   Yes Yes   Sig: Take 300 mg by mouth daily. amLODIPine (NORVASC) 10 mg tablet 2019 at Unknown time  Yes Yes   Sig: Take 10 mg by mouth daily. aspirin (ASPIRIN) 325 mg tablet 2019 at Unknown time  Yes Yes   Sig: Take 325 mg by mouth daily. cholecalciferol (VITAMIN D3) (1000 Units /25 mcg) tablet   Yes Yes   Sig: Take 2,000 Units by mouth daily. 2 x 1000 units   cyanocobalamin (VITAMIN B12) 500 mcg tablet   Yes Yes   Sig: Take 1,000 mcg by mouth daily. divalproex ER (DEPAKOTE ER) 500 mg ER tablet 2019 at Unknown time  Yes Yes   Sig: Take 1,000 mg by mouth daily. hydrOXYzine HCl (ATARAX) 10 mg tablet 2019 at Unknown time  Yes Yes   Sig: Take 10 mg by mouth two (2) times daily as needed for Anxiety. lisinopril (PRINIVIL, ZESTRIL) 40 mg tablet 2019 at Unknown time  Yes Yes   Sig: Take 20 mg by mouth daily. 1/2 of 40mg tab   loratadine (CLARITIN) 10 mg tablet 2019 at Unknown time  Yes Yes   Sig: Take 10 mg by mouth daily.    metoprolol tartrate (LOPRESSOR) 50 mg tablet 2019 at Unknown time  Yes Yes   Sig: Take 50 mg by mouth two (2) times a day.      Facility-Administered Medications: None          Thank you,  Juanito Crook [facility]

## 2019-11-26 NOTE — PROGRESS NOTES
Nutrition Assessment:    INTERVENTIONS/RECOMMENDATIONS:   · Continue current diet  · Continue to assist and encourage with PO intake  · Please document % meals consumed in flowsheet I/O's under intake     ASSESSMENT:   Chart reviewed. Pt sleeping during time of visit however RN and PCT report pt is eating well when awake. Diet Order: (Dysphagia Advanced Soft)  % Eaten:    Patient Vitals for the past 72 hrs:   % Diet Eaten   11/24/19 1733 95 %     Pertinent Medications: [x]Reviewed:   Pertinent Labs: [x]Reviewed:   Food Allergies: [x]NKFA  []Other   Last BM:  11/24  Edema: trace   []RUE   []LUE   []RLE   [x]LLE      Pressure Ulcer:   n/a   [] Stage I   [] Stage II   [] Stage III   [] Stage IV      Anthropometrics: Height: 5' 10\" (177.8 cm) Weight: 82.6 kg (182 lb 1.6 oz)    IBW (%IBW):   ( ) UBW (%UBW):   (  %)    BMI: Body mass index is 26.13 kg/m². This BMI is indicative of:  []Underweight   []Normal   [x]Overweight   [] Obesity   [] Extreme Obesity (BMI>40)  Last Weight Metrics:  Weight Loss Metrics 11/14/2019 11/6/2019   Today's Wt 182 lb 1.6 oz 184 lb 11.9 oz   BMI 26.13 kg/m2 26.51 kg/m2       Estimated Nutrition Needs (Based on): 2036 Kcals/day(BMR (1566) x 1. 3AF) , 83 g(1.0 g/kg bw) Protein  Carbohydrate: At Least 130 g/day  Fluids: 2030 mL/day or per primary team    NUTRITION DIAGNOSES:   Problem:  No nutritional diagnosis at this time      Etiology: related to       Signs/Symptoms: as evidenced by      Previous Nutrition Dx:  [x] N/a  [] Unresolved           [] Progressing    NUTRITION INTERVENTIONS:  Meals/Snacks: General/healthful diet                  GOAL:   consume>75% of meals in 5-7 days    NUTRITION MONITORING AND EVALUATION      Food/Nutrient Intake Outcomes:  Total energy intake  Physical Signs/Symptoms Outcomes: Weight/weight change    Previous Goal Met:   [x] Met              [] Progressing Towards Goal              [] Not Progressing Towards Goal   Previous Recommendations:   [] Implemented          [] Not Implemented          [x] Not Applicable    LEARNING NEEDS (Diet, Food/Nutrient-Drug Interaction):    [x] None Identified   [] Identified and Education Provided/Documented   [] Identified and Pt declined/was not appropriate     Cultural, Tenriism, OR Ethnic Dietary Needs:    [x] None Identified   [] Identified and Addressed     [x] Interdisciplinary Care Plan Reviewed/Documented    [x] Discharge Planning: General healthy diet with texture modifications as needed    [] Participated in Interdisciplinary Rounds    NUTRITION RISK:    [] High              [] Moderate           [x]  Low  []  Minimal/Uncompromised      Bay Chapman RDN  Pager [phone]  Weekend Pager [phone]

## 2019-11-26 NOTE — PROGRESS NOTES
Oncology End of Shift Note      Bedside shift change report given to [facility], RN (incoming nurse) by Mare Ptae (outgoing nurse) on Amy Chawla. Report included the following information SBAR, Kardex and MAR. Shift Summary: Heel boots were applied. Patient was turned throughout the day. Patient pulled out IV and RN was not called at all in regards to patient picking at or pulling out IV. Patient received haldol once. Issues for Physician to Address:  None. Patient on Cardiac Monitoring?     [] Yes  [x] No    Rhythm:          Mare Pate

## 2019-11-26 NOTE — PROGRESS NOTES
Bedside shift change report given to [address] [place] [address] (oncoming nurse) by Nadira CLAY (offgoing nurse). Report included the following information SBAR, Kardex, ED Summary, Intake/Output, MAR and Recent Results.

## 2019-11-27 PROCEDURE — 74011250636 HC RX REV CODE- 250/636: Performed by: INTERNAL MEDICINE

## 2019-11-27 PROCEDURE — 74011250637 HC RX REV CODE- 250/637: Performed by: INTERNAL MEDICINE

## 2019-11-27 PROCEDURE — 74011250636 HC RX REV CODE- 250/636: Performed by: HOSPITALIST

## 2019-11-27 PROCEDURE — 97530 THERAPEUTIC ACTIVITIES: CPT

## 2019-11-27 PROCEDURE — 65270000015 HC RM PRIVATE ONCOLOGY

## 2019-11-27 PROCEDURE — 94760 N-INVAS EAR/PLS OXIMETRY 1: CPT

## 2019-11-27 PROCEDURE — 74011250636 HC RX REV CODE- 250/636: Performed by: NURSE PRACTITIONER

## 2019-11-27 RX ORDER — LORAZEPAM 2 MG/ML
1 INJECTION INTRAMUSCULAR ONCE
Status: COMPLETED | OUTPATIENT
Start: 2019-11-27 | End: 2019-11-27

## 2019-11-27 RX ORDER — OLANZAPINE 2.5 MG/1
5 TABLET ORAL ONCE
Status: DISCONTINUED | OUTPATIENT
Start: 2019-11-27 | End: 2019-11-27

## 2019-11-27 RX ORDER — HYDRALAZINE HYDROCHLORIDE 20 MG/ML
20 INJECTION INTRAMUSCULAR; INTRAVENOUS
Status: DISCONTINUED | OUTPATIENT
Start: 2019-11-27 | End: 2019-12-03 | Stop reason: HOSPADM

## 2019-11-27 RX ADMIN — HALOPERIDOL LACTATE 2 MG: 5 INJECTION, SOLUTION INTRAMUSCULAR at 16:39

## 2019-11-27 RX ADMIN — Medication 10 ML: at 14:05

## 2019-11-27 RX ADMIN — LACOSAMIDE 100 MG: 100 TABLET, FILM COATED ORAL at 21:21

## 2019-11-27 RX ADMIN — Medication 10 ML: at 21:23

## 2019-11-27 RX ADMIN — Medication 5 ML: at 06:17

## 2019-11-27 RX ADMIN — HEPARIN SODIUM 5000 UNITS: 5000 INJECTION INTRAVENOUS; SUBCUTANEOUS at 14:04

## 2019-11-27 RX ADMIN — DIVALPROEX SODIUM 500 MG: 250 TABLET, EXTENDED RELEASE ORAL at 08:46

## 2019-11-27 RX ADMIN — HEPARIN SODIUM 5000 UNITS: 5000 INJECTION INTRAVENOUS; SUBCUTANEOUS at 21:21

## 2019-11-27 RX ADMIN — LORAZEPAM 1 MG: 2 INJECTION INTRAMUSCULAR; INTRAVENOUS at 23:21

## 2019-11-27 RX ADMIN — HYDRALAZINE HYDROCHLORIDE 20 MG: 20 INJECTION INTRAMUSCULAR; INTRAVENOUS at 09:52

## 2019-11-27 RX ADMIN — ALLOPURINOL 100 MG: 100 TABLET ORAL at 08:46

## 2019-11-27 RX ADMIN — HEPARIN SODIUM 5000 UNITS: 5000 INJECTION INTRAVENOUS; SUBCUTANEOUS at 06:17

## 2019-11-27 RX ADMIN — ASPIRIN 325 MG: 325 TABLET, FILM COATED ORAL at 08:46

## 2019-11-27 RX ADMIN — HALOPERIDOL LACTATE 2 MG: 5 INJECTION, SOLUTION INTRAMUSCULAR at 22:00

## 2019-11-27 RX ADMIN — LACOSAMIDE 100 MG: 100 TABLET, FILM COATED ORAL at 08:46

## 2019-11-27 NOTE — INTERDISCIPLINARY ROUNDS
Oncology Interdisciplinary rounds were held today to discuss patient plan of care and outcomes. The following members were present: Nursing, Physician, Case Management, Pharmacy, and PT/OT Actual Length of Stay: 13 DRG GLOS: 4.3 Expected Length of Stay: 4d 7h 
 
 
 
               Plan            [facility] [address] Discharge TBA

## 2019-11-27 NOTE — PROGRESS NOTES
EDIN:   1) [facility] with 24/7 caregivers vs. [facility]   2) Complete [facility] per niece request      2:30 PM- CM spoke with pt's niece regarding EDIN and updated psych note. Pt's niece is requesting it be sent to the [place] to pursue pt going to the [place] as she does not believe it is safe for him to go home with 24/7 caregivers at this time. Pt's niece states they are not able to get pt approved for home based primary care through the [place] until pt is discharged and she does not feel as though that is appropriate. CM will fax updated information to the [place] and follow up on Friday 11/29/19.      Ayaz Pastrana, MSTUNDE, [address] [facility]   [phone]

## 2019-11-27 NOTE — PROGRESS NOTES
Problem: Mobility Impaired (Adult and Pediatric)  Goal: *Acute Goals and Plan of Care (Insert Text)  Description  FUNCTIONAL STATUS PRIOR TO ADMISSION: patient very confused and unable to provide PLOF. Patient reports possibly ambulating with SPC vs RW. HOME SUPPORT PRIOR TO ADMISSION: The patient lives at SNF per chart. Patient unable to provide information. Physical Therapy Goals  Goals reassessed 37/81/3688, remain applicable and appropriate, continue for additional week  Initiated 11/15/2019  1. Patient will move from supine to sit and sit to supine , scoot up and down and roll side to side in bed with supervision/set-up within 7 day(s). 2.  Patient will transfer from bed to chair and chair to bed with minimal assistance/contact guard assist using the least restrictive device within 7 day(s). 3.  Patient will perform sit to stand with minimal assistance/contact guard assist within 7 day(s). 4.  Patient will ambulate with minimal assistance/contact guard assist for 50 feet with the least restrictive device within 7 day(s). Outcome: Progressing Towards Goal   PHYSICAL THERAPY TREATMENT  Patient: Renee Benz (89 y.o. male)  Date: 11/27/2019  Diagnosis: Seizure ([facility] [address] [R56.9]   Acute encephalopathy       Precautions: Fall, Bed Alarm  Chart, physical therapy assessment, plan of care and goals were reviewed. ASSESSMENT  Patient continues with skilled PT services and is slowly progressing towards goals. Pt alert, confused, calling out during session and speaking nonsensically, however followed simple commands consistently. Pt demo improved function with bed mob and transfer sit to stand this date requiring max A x 1. Pt fatigued quickly in standing, noted stooped posture with inability to reach full upright position. Barriers to indep with mobility remain impaired cognition, impaired balance, general weakness, increased risk for fall.  Will progress mobility training as tolerated. Current Level of Function Impacting Discharge (mobility/balance): bed mob mod/ max A, static sitting balance SBA/ CGA,  transfer sit to stand max A    Other factors to consider for discharge: pt will require 24/7 assist at d/c         PLAN :  Patient continues to benefit from skilled intervention to address the above impairments. Continue treatment per established plan of care. to address goals. Recommendation for discharge: (in order for the patient to meet his/her long term goals)  Therapy up to 5 days/week in SNF setting    This discharge recommendation:  Has not yet been discussed the attending provider and/or case management    IF patient discharges home will need the following DME: to be determined (TBD)       SUBJECTIVE:   Patient confused, asking for help removing socks    OBJECTIVE DATA SUMMARY:   Critical Behavior:  Neurologic State: Alert, Eyes open spontaneously, Confused, Restless  Orientation Level: Oriented to person, Disoriented to place, Disoriented to situation, Disoriented to time  Cognition: Decreased command following, Decreased attention/concentration, Poor safety awareness, Memory loss, Impulsive  Safety/Judgement: Decreased insight into deficits, Decreased awareness of need for safety, Decreased awareness of need for assistance, Decreased awareness of environment, Fall prevention  Functional Mobility Training:  Bed Mobility:     Supine to Sit: Moderate assistance; Additional time(assist for trunk and LEs, pt demo good effort to assist)  Sit to Supine: Maximum assistance(assist for LEs >trunk)  Scooting: Maximum assistance(for scoot toward R in sit, A for lift to clear buttocks)        Transfers:  Sit to Stand: Maximum assistance; Additional time(sit to partial stand, A for lift/ balance, stooped posture)  Stand to Sit: Moderate assistance(assist for eccentric control)                             Balance:  Sitting: Impaired  Sitting - Static: Good (unsupported)  Sitting - Dynamic: Fair (occasional)  Standing: Impaired; With support(squat position)  Standing - Static: Poor;Constant support  Pain Rating:  Pt denied c/o pain    Activity Tolerance:   Fair  Please refer to the flowsheet for vital signs taken during this treatment.     After treatment patient left in no apparent distress:   Call bell within reach, Side rails x 3, and sitting up in bed with lunch tray set up     COMMUNICATION/COLLABORATION:   The patients plan of care was discussed with: Registered Nurse    Syl Walden, PT   Time Calculation: 15 mins

## 2019-11-27 NOTE — PROGRESS NOTES
I reviewed pertinent labs and imaging, and discussed /agreed on the plan of care with Dr. Susi James. Hospitalist Progress Note    NAME: Amy Chawla   :  1945   MRN:  [id number]     History of Present Illness: The patient is 66years old  man with past medical history of seizure disorder, chronic encephalopathy with underlying schizophrenia presented to emergency department due to change in his mentation as it was reported at the nursing facility. There was no family at bedside or caregiver and all history was obtained from EMR and ER doctor. They report that when the patient got here they called code stroke however he did not have any neurologic deficits and he was evaluated by tele-neurologist which he recommended to admit the patient for inpatient EEG     Assessment / Plan:  Acute encephalopathy-back to baseline  · Multifactorial   · Hx of seizure disorder; discontinue Keppra and begin Vimpat  · Work-up unremarkable  · CM working on placement/discharge planning    SÁNCHEZ- resolved  · Cr 0.82    Seizure disorder  · Discontinue Keppra and start Vimpat  · Appreciate Neurology input    HTN  · PRN metoprolol IV    Schizophrenia/Delerium  · Appreciate psychiatry input; does not meet inpatient criteria- signed off 19  · Psychiatry re-consulted 2019; signed off 2019    25.0 - 29.9 Overweight / Body mass index is 26.13 kg/m². Code status: DNR  Prophylaxis: None indicated  Recommended Disposition: SNF/LTC     Subjective:     Chief Complaint / Reason for Physician Visit  Patient in bed eating breakfast.  Patient more impulsive overnight. Discussed with RN events overnight.      Review of Systems:  Symptom Y/N Comments  Symptom Y/N Comments   Fever/Chills    Chest Pain     Poor Appetite    Edema     Cough    Abdominal Pain     Sputum    Joint Pain     SOB/WILSON    Pruritis/Rash     Nausea/vomit    Tolerating PT/OT     Diarrhea    Tolerating Diet     Constipation    Other       Could NOT obtain due to: Confusion     Objective:     VITALS:   Last 24hrs VS reviewed since prior progress note. Most recent are:  Patient Vitals for the past 24 hrs:   Temp Pulse Resp BP SpO2   11/27/19 0832 97.3 °F (36.3 °C) 69  (!) 174/91 96 %   11/26/19 2245 98.7 °F (37.1 °C) 78 16 119/71 94 %   11/26/19 1941 98.2 °F (36.8 °C) 85 18 132/77 95 %   11/26/19 1523 97.6 °F (36.4 °C) 79 18 141/74 96 %     No intake or output data in the 24 hours ending 11/27/19 1305     PHYSICAL EXAM:  General: No acute distress.  male. EENT:  EOMI. Anicteric sclerae. MMM +[place]  Resp:  Lung sounds clear throughout, no wheezing or rales. No accessory muscle use  CV:  RRR  No edema  GI:  Soft, Non distended, Non tender.  +Bowel sounds  Neurologic:  Alert and oriented to person, normal speech,   Psych:   Little to no insight. Not anxious, +agitated, +impulsive   Skin:  No rashes. No jaundice    Reviewed most current lab test results and cultures  YES  Reviewed most current radiology test results   YES  Review and summation of old records today    NO  Reviewed patient's current orders and MAR    YES  PMH/ reviewed - no change compared to H&P  ________________________________________________________________________  Care Plan discussed with:    Comments   Patient x    Family      RN x    Care Manager x    Consultant                        Multidiciplinary team rounds were held today with , nursing, pharmacist and clinical coordinator. Patient's plan of care was discussed; medications were reviewed and discharge planning was addressed.      ________________________________________________________________________  Total NON critical care TIME:  25   Minutes    Total CRITICAL CARE TIME Spent:   Minutes non procedure based      Comments   >50% of visit spent in counseling and coordination of care     ________________________________________________________________________  Malachi Whittington NP     Procedures: see electronic medical records for all procedures/Xrays and details which were not copied into this note but were reviewed prior to creation of Plan. LABS:  I reviewed today's most current labs and imaging studies.   Pertinent labs include:  Recent Labs     11/25/19  0340   WBC 6.2   HGB 10.5*   HCT 32.1*        Recent Labs     11/25/19  0340      K 4.2      CO2 28   GLU 81   BUN 32*   CREA 0.82   CA 8.4*       Signed: Maria L Valles NP

## 2019-11-28 PROCEDURE — 74011250637 HC RX REV CODE- 250/637: Performed by: INTERNAL MEDICINE

## 2019-11-28 PROCEDURE — 65270000015 HC RM PRIVATE ONCOLOGY

## 2019-11-28 PROCEDURE — 74011250636 HC RX REV CODE- 250/636: Performed by: INTERNAL MEDICINE

## 2019-11-28 RX ADMIN — ASPIRIN 325 MG: 325 TABLET, FILM COATED ORAL at 10:01

## 2019-11-28 RX ADMIN — Medication 10 ML: at 06:44

## 2019-11-28 RX ADMIN — HEPARIN SODIUM 5000 UNITS: 5000 INJECTION INTRAVENOUS; SUBCUTANEOUS at 06:43

## 2019-11-28 RX ADMIN — HEPARIN SODIUM 5000 UNITS: 5000 INJECTION INTRAVENOUS; SUBCUTANEOUS at 21:14

## 2019-11-28 RX ADMIN — HEPARIN SODIUM 5000 UNITS: 5000 INJECTION INTRAVENOUS; SUBCUTANEOUS at 13:44

## 2019-11-28 RX ADMIN — ALLOPURINOL 100 MG: 100 TABLET ORAL at 10:02

## 2019-11-28 RX ADMIN — LACOSAMIDE 100 MG: 100 TABLET, FILM COATED ORAL at 10:02

## 2019-11-28 RX ADMIN — LACOSAMIDE 100 MG: 100 TABLET, FILM COATED ORAL at 21:14

## 2019-11-28 RX ADMIN — Medication 10 ML: at 21:14

## 2019-11-28 RX ADMIN — HALOPERIDOL LACTATE 2 MG: 5 INJECTION, SOLUTION INTRAMUSCULAR at 22:28

## 2019-11-28 RX ADMIN — DIVALPROEX SODIUM 500 MG: 250 TABLET, EXTENDED RELEASE ORAL at 10:01

## 2019-11-28 RX ADMIN — Medication 10 ML: at 13:44

## 2019-11-28 NOTE — PROGRESS NOTES
I reviewed pertinent labs and imaging, and discussed /agreed on the plan of care with Dr. Sukhdeep Farmer. Hospitalist Progress Note    NAME: Kraolina Smith   :  1945   MRN:  [id number]     History of Present Illness: The patient is 66years old  man with past medical history of seizure disorder, chronic encephalopathy with underlying schizophrenia presented to emergency department due to change in his mentation as it was reported at the nursing facility. There was no family at bedside or caregiver and all history was obtained from EMR and ER doctor. They report that when the patient got here they called code stroke however he did not have any neurologic deficits and he was evaluated by tele-neurologist which he recommended to admit the patient for inpatient EEG     Assessment / Plan:  Acute encephalopathy-back to baseline  · Multifactorial   · Hx of seizure disorder; discontinue Keppra and begin Vimpat  · Work-up unremarkable  · CM working on placement/discharge planning    SÁNCHEZ- resolved  · Cr 0.82    Seizure disorder  · Discontinue Keppra and start Vimpat  · Appreciate Neurology input    HTN  · PRN metoprolol IV    Schizophrenia/Delerium  · Appreciate psychiatry input; does not meet inpatient criteria- signed off 19  · Psychiatry re-consulted 2019; signed off 2019    25.0 - 29.9 Overweight / Body mass index is 26.13 kg/m². Code status: DNR  Prophylaxis: None indicated  Recommended Disposition: SNF/LTC   Family continues to request patient be discharged to [facility]. His niece does not feel patient is safe to go home even with 24 hour care. CM working on placement at [address]. Subjective:     Chief Complaint / Reason for Physician Visit  Patient less impulsive overnight. Received one dose of Ativan. Discussed with RN events overnight.      Review of Systems:  Symptom Y/N Comments  Symptom Y/N Comments   Fever/Chills    Chest Pain     Poor Appetite    Edema Cough    Abdominal Pain     Sputum    Joint Pain     SOB/WILSON    Pruritis/Rash     Nausea/vomit    Tolerating PT/OT     Diarrhea    Tolerating Diet     Constipation    Other       Could NOT obtain due to: Confusion     Objective:     VITALS:   Last 24hrs VS reviewed since prior progress note. Most recent are:  Patient Vitals for the past 24 hrs:   Temp Pulse Resp BP SpO2   11/28/19 0844 97.8 °F (36.6 °C) 69 16 145/72 96 %   11/27/19 2000 98.1 °F (36.7 °C) 95 18 151/84 96 %   11/27/19 1519 98.3 °F (36.8 °C) 89 18 91/62 94 %     No intake or output data in the 24 hours ending 11/28/19 0909     PHYSICAL EXAM:  General:  male. NAD   EENT:  EOMI. Anicteric sclerae. MMM. Hard of hearing   Resp:  CTA, no wheezing or rales. No accessory muscle use  CV:  Regular rate and rhythm  No edema  GI:  Soft, Non distended, Non tender.  +BSx4 quads  Neurologic:  Alert and oriented to person, normal speech,   Psych:   Little to no insight. Not anxious, agitated, less impulsive overnight  Skin:  No rashes. No jaundice    Reviewed most current lab test results and cultures  YES  Reviewed most current radiology test results   YES  Review and summation of old records today    NO  Reviewed patient's current orders and MAR    YES  PMH/ reviewed - no change compared to H&P  ________________________________________________________________________  Care Plan discussed with:    Comments   Patient x    Family      RN x    Care Manager x    Consultant                        Multidiciplinary team rounds were held today with , nursing, pharmacist and clinical coordinator. Patient's plan of care was discussed; medications were reviewed and discharge planning was addressed.      ________________________________________________________________________  Total NON critical care TIME:  25   Minutes    Total CRITICAL CARE TIME Spent:   Minutes non procedure based      Comments   >50% of visit spent in counseling and coordination of care     ________________________________________________________________________  Madalyn Suero NP     Procedures: see electronic medical records for all procedures/Xrays and details which were not copied into this note but were reviewed prior to creation of Plan. LABS:  I reviewed today's most current labs and imaging studies. Pertinent labs include:  No results for input(s): WBC, HGB, HCT, PLT, HGBEXT, HCTEXT, PLTEXT, HGBEXT, HCTEXT, PLTEXT in the last 72 hours. No results for input(s): NA, K, CL, CO2, GLU, BUN, CREA, CA, MG, PHOS, ALB, TBIL, TBILI, SGOT, ALT, INR, INREXT, INREXT in the last 72 hours.     Signed: Madalyn Suero NP

## 2019-11-28 NOTE — PROGRESS NOTES
Oncology End of Shift Note      Bedside shift change report given to Ania Khan RN (incoming nurse) by Don Riojas RN (outgoing nurse) on [facility]. Report included the following information SBAR, Kardex and MAR. Shift Summary:   Patient tolerated care well through out shift. No complaints of pain. Family came to visit at beginning of shift. All scheduled medications given. Hourly rounding completed. Issues for Physician to Address:       Patient on Cardiac Monitoring?     [] Yes  [x] No    Rhythm:          Shift Events        Don Riojas RN

## 2019-11-28 NOTE — PROGRESS NOTES
Oncology End of Shift Note      Bedside shift change report given to [facility] DANNA COLLIER (incoming nurse) by Teodora Kinsey (outgoing nurse) on Lu[facility] Bulls. Report included the following information SBAR, Kardex and MAR. Shift Summary: patient stable during shift. Received prn haldol and one time dose of ativan. Patient attempted to removed IV and get out of bed multiple times which prompted ativan administration. Issues for Physician to Address:       Patient on Cardiac Monitoring?     [] Yes  [] No    Rhythm: Telemetry: normal sinus rhythm         Shift Events    Ira Calvillo

## 2019-11-29 ENCOUNTER — APPOINTMENT (OUTPATIENT)
Dept: GENERAL RADIOLOGY | Age: 74
DRG: 100 | End: 2019-11-29
Attending: INTERNAL MEDICINE
Payer: MEDICARE

## 2019-11-29 PROCEDURE — 74011250637 HC RX REV CODE- 250/637: Performed by: INTERNAL MEDICINE

## 2019-11-29 PROCEDURE — 97530 THERAPEUTIC ACTIVITIES: CPT | Performed by: PHYSICAL THERAPIST

## 2019-11-29 PROCEDURE — 65270000015 HC RM PRIVATE ONCOLOGY

## 2019-11-29 PROCEDURE — 71045 X-RAY EXAM CHEST 1 VIEW: CPT

## 2019-11-29 PROCEDURE — 74011250636 HC RX REV CODE- 250/636: Performed by: NURSE PRACTITIONER

## 2019-11-29 PROCEDURE — 74011250636 HC RX REV CODE- 250/636: Performed by: HOSPITALIST

## 2019-11-29 PROCEDURE — 74011250636 HC RX REV CODE- 250/636: Performed by: INTERNAL MEDICINE

## 2019-11-29 RX ORDER — LORAZEPAM 2 MG/ML
1 INJECTION INTRAMUSCULAR ONCE
Status: COMPLETED | OUTPATIENT
Start: 2019-11-29 | End: 2019-11-29

## 2019-11-29 RX ORDER — HALOPERIDOL 5 MG/ML
5 INJECTION INTRAMUSCULAR ONCE
Status: COMPLETED | OUTPATIENT
Start: 2019-11-29 | End: 2019-11-29

## 2019-11-29 RX ADMIN — DIVALPROEX SODIUM 500 MG: 250 TABLET, EXTENDED RELEASE ORAL at 09:11

## 2019-11-29 RX ADMIN — HALOPERIDOL LACTATE 5 MG: 5 INJECTION INTRAMUSCULAR at 21:18

## 2019-11-29 RX ADMIN — ALLOPURINOL 100 MG: 100 TABLET ORAL at 09:11

## 2019-11-29 RX ADMIN — ASPIRIN 325 MG: 325 TABLET, FILM COATED ORAL at 09:11

## 2019-11-29 RX ADMIN — Medication 10 ML: at 05:08

## 2019-11-29 RX ADMIN — HEPARIN SODIUM 5000 UNITS: 5000 INJECTION INTRAVENOUS; SUBCUTANEOUS at 05:08

## 2019-11-29 RX ADMIN — Medication 10 ML: at 13:30

## 2019-11-29 RX ADMIN — LACOSAMIDE 100 MG: 100 TABLET, FILM COATED ORAL at 09:11

## 2019-11-29 RX ADMIN — HEPARIN SODIUM 5000 UNITS: 5000 INJECTION INTRAVENOUS; SUBCUTANEOUS at 13:30

## 2019-11-29 RX ADMIN — LACOSAMIDE 100 MG: 100 TABLET, FILM COATED ORAL at 21:19

## 2019-11-29 RX ADMIN — HEPARIN SODIUM 5000 UNITS: 5000 INJECTION INTRAVENOUS; SUBCUTANEOUS at 21:17

## 2019-11-29 RX ADMIN — LORAZEPAM 1 MG: 2 INJECTION INTRAMUSCULAR; INTRAVENOUS at 22:50

## 2019-11-29 RX ADMIN — HALOPERIDOL LACTATE 2 MG: 5 INJECTION, SOLUTION INTRAMUSCULAR at 19:50

## 2019-11-29 RX ADMIN — Medication 10 ML: at 21:17

## 2019-11-29 NOTE — PROGRESS NOTES
Problem: Mobility Impaired (Adult and Pediatric)  Goal: *Acute Goals and Plan of Care (Insert Text)  Description  FUNCTIONAL STATUS PRIOR TO ADMISSION: patient very confused and unable to provide PLOF. Patient reports possibly ambulating with SPC vs RW. HOME SUPPORT PRIOR TO ADMISSION: The patient lives at SNF per chart. Patient unable to provide information. Physical Therapy Goals  Revised 11/29/2019  1. Patient will move from supine to sit and sit to supine , scoot up and down and roll side to side in bed with moderate assistance  within 7 day(s). 2.  Patient will transfer from bed to chair and chair to bed with moderate assistance using the least restrictive device within 7 day(s). 3.  Patient will perform sit to stand with moderate assistance  within 7 day(s). 4.  Patient will ambulate with moderate assistance  for 10 feet with the least restrictive device within 7 day(s). Physical Therapy Goals  Goals reassessed 87/86/9710, remain applicable and appropriate, continue for additional week  Initiated 11/15/2019  1. Patient will move from supine to sit and sit to supine , scoot up and down and roll side to side in bed with supervision/set-up within 7 day(s). 2.  Patient will transfer from bed to chair and chair to bed with minimal assistance/contact guard assist using the least restrictive device within 7 day(s). 3.  Patient will perform sit to stand with minimal assistance/contact guard assist within 7 day(s). 4.  Patient will ambulate with minimal assistance/contact guard assist for 50 feet with the least restrictive device within 7 day(s). Outcome: Not Met   PHYSICAL THERAPY TREATMENT: WEEKLY REASSESSMENT  Patient: Bradley Pino (43 y.o. male)  Date: 11/29/2019  Primary Diagnosis: Seizure [facility]) [R56.9]        Precautions:   Fall, Bed Alarm      ASSESSMENT  Patient continues with skilled PT services and is not progressing towards goals.  Patient remains confused and verbally perseverative throughout tx session. Patient able to follow commands with verbal and tactile cues. Patient requiring max to total A for overall mobility. Once standing patient is not able to advance LEs requiring manual weight shift and max A to side step to head of bed. Recommend SNF following discharge to maximize functional mobility. May ultimately need LTC. Lula Corrales Patient's progression toward goals since last assessment: continues to requires max to total A of 2; no significant progress noted    Current Level of Function Impacting Discharge (mobility/balance): max to total A of 2      Other factors to consider for discharge: significant confusion         PLAN :  Goals have been updated based on progression since last assessment. Patient continues to benefit from skilled intervention to address the above impairments. Recommendations and Planned Interventions: bed mobility training, transfer training, gait training, patient and family training/education, and therapeutic activities      Frequency/Duration: Patient will be followed by physical therapy:  3 times a week to address goals. Recommendation for discharge: (in order for the patient to meet his/her long term goals)  Therapy up to 5 days/week in SNF setting    This discharge recommendation:  Has been made in collaboration with the attending provider and/or case management    IF patient discharges home will need the following DME: to be determined (TBD)         SUBJECTIVE:   Patient stated I like to eat food. Any kind of food.     OBJECTIVE DATA SUMMARY:   HISTORY:    Past Medical History:   Diagnosis Date    CAD (coronary artery disease)     Gout     Hypertension     Schizophrenia ([facility] [address]     Seizures ([facility] [address]    History reviewed. No pertinent surgical history.       Home Situation  Patient Expects to be Discharged to[de-identified] Private residence    EXAMINATION/PRESENTATION/DECISION MAKING:   Critical Behavior:  Neurologic State: Alert, Eyes open spontaneously, Confused  Orientation Level: Oriented to person, Disoriented to time, Disoriented to situation, Disoriented to place  Cognition: Decreased command following, Decreased attention/concentration, Impulsive, Poor safety awareness  Safety/Judgement: Decreased insight into deficits, Decreased awareness of need for safety, Decreased awareness of need for assistance, Decreased awareness of environment, Fall prevention  Hearing: Auditory  Auditory Impairment: None    Range Of Motion:  AROM: Generally decreased, functional                       Strength:    Strength: Generally decreased, functional                    Tone & Sensation:   Tone: Normal                              Coordination:  Coordination: Generally decreased, functional    Functional Mobility:  Bed Mobility:  Rolling: Total assistance  Supine to Sit: Maximum assistance;Assist x2  Sit to Supine: Total assistance;Assist x2  Scooting: Total assistance  Transfers:  Sit to Stand: Minimum assistance; Moderate assistance;Assist x2  Stand to Sit: Moderate assistance;Assist x2                       Balance:   Sitting: Impaired  Sitting - Static: Good (unsupported)  Sitting - Dynamic: Good (unsupported)  Standing: Impaired  Standing - Static: Poor;Constant support  Standing - Dynamic : Poor;Constant support  Ambulation/Gait Training:   Patient unable to advance LEs to step requiring manual weight shift and max A to advance LEs         Activity Tolerance:   Fair  Please refer to the flowsheet for vital signs taken during this treatment. After treatment patient left in no apparent distress:   Supine in bed, Heels elevated for pressure relief, Call bell within reach, and Restraints    COMMUNICATION/EDUCATION:   The patients plan of care was discussed with: Registered Nurse, , and Rehabilitation Attendant. Patient is unable to participate in goal setting and plan of care.     Thank you for this referral.  Richard Bess, PT   Time Calculation: 25 mins

## 2019-11-29 NOTE — PROGRESS NOTES
Oncology End of Shift Note      Bedside shift change report given to  [facility], RN (incoming nurse) by Liam Galvan (outgoing nurse) on Evia Glass. Report included the following information SBAR, MAR and Quality Measures. Shift Summary:  Pt became restless and confused overnight, Hadol given. VSS      Issues for Physician to Address:       Patient on Cardiac Monitoring?     [] Yes  [x] No    Rhythm:          Shift Events        Liam Galvan

## 2019-11-29 NOTE — PROGRESS NOTES
EDIN:   1) LTC   2) Pt will need 2ND IM letter at time of discharge   3) Pt will need [facility] transportation at time of discharge     3:18 PM- CM spoke with [facility] regarding EDIN. CM informed pt's niece that pt is not able to transfer to the [place] as this is his new baseline. Pt's niece was upset and states she will not allow him to go to a [place] funded placement due to the quality of care (per her research). CM informed her that if she does not want him to go to a [address] [place] [address] placement then she would need to take him home with 24/7 care or pay for another LTC out of pocket. Nijeimy stated she is contacting the [place] for further options and assistance. 11:16 AM- CM contacted [facility] who stated she was busy and requested CM contact her in 30/45 minutes. CM will follow up shortly and update her on EDIN. 10:30 AM- CM contacted Rima Geronimon at the [place] who states they have received everything and reviewed it and does not believe pt qualifies for a transfer. This is likely pt's new baseline and the mental health and medical side will not be able to assist with identifying the reasons for the seizures. HERLINDA spoke with Misha Saeed at the [place] who states pt does not need Medicaid as he is covered under the [place] waiver for LTC pending he goes to their facilities ([facility] in the local area). CM faxed updated clinicals to Misha Saeed at [phone]. Pt needs a chest x-ray, CM informed hospitalist NP. Once done CM will fax to the [place], anticipate pt can go early next week. CM informing pt's [facility].      Verónica Carlos, MSW, [address]   [phone]

## 2019-11-29 NOTE — PROGRESS NOTES
I reviewed pertinent labs and imaging, and discussed /agreed on the plan of care with Dr. Pelon Stephen. Hospitalist Progress Note    NAME: Car Stallings   :  1945   MRN:  [id number]     History of Present Illness: The patient is 66years old  man with past medical history of seizure disorder, chronic encephalopathy with underlying schizophrenia presented to emergency department due to change in his mentation as it was reported at the nursing facility. There was no family at bedside or caregiver and all history was obtained from EMR and ER doctor. They report that when the patient got here they called code stroke however he did not have any neurologic deficits and he was evaluated by tele-neurologist which he recommended to admit the patient for inpatient EEG     Assessment / Plan:  Acute encephalopathy-back to baseline  · Multifactorial   · Hx of seizure disorder; discontinue Keppra and begin Vimpat  · Work-up unremarkable  · CM working on placement/discharge planning    SÁNCHEZ- resolved  · Cr 0.82    Seizure disorder  · Discontinue Keppra and start Vimpat  · Appreciate Neurology input    HTN  · PRN metoprolol IV    Schizophrenia/Delerium  · Appreciate psychiatry input; does not meet inpatient criteria- signed off 19  · Psychiatry re-consulted 2019; signed off 2019    25.0 - 29.9 Overweight / Body mass index is 26.13 kg/m². Code status: DNR  Prophylaxis: None indicated  Recommended Disposition: SNF/LTC   Family continues to request patient be discharged to [facility]. His niece does not feel patient is safe to go home even with 24 hour care. CM working on placement at [address]. Subjective:     Chief Complaint / Reason for Physician Visit  Patient in room without any new complaints. NAD. Discussed with RN events overnight.      Review of Systems:  Symptom Y/N Comments  Symptom Y/N Comments   Fever/Chills    Chest Pain     Poor Appetite    Edema     Cough Abdominal Pain     Sputum    Joint Pain     SOB/WILSON    Pruritis/Rash     Nausea/vomit    Tolerating PT/OT     Diarrhea    Tolerating Diet     Constipation    Other       Could NOT obtain due to: Confusion     Objective:     VITALS:   Last 24hrs VS reviewed since prior progress note. Most recent are:  Patient Vitals for the past 24 hrs:   Temp Pulse Resp BP SpO2   11/29/19 0735 97.9 °F (36.6 °C) 63 18 110/70 96 %   11/28/19 2317 97.9 °F (36.6 °C) 78 18 131/72 97 %   11/28/19 1957 97.5 °F (36.4 °C) 94 18 130/74 97 %   11/28/19 1608 98.7 °F (37.1 °C) 78 16 120/76 97 %     No intake or output data in the 24 hours ending 11/29/19 1009     PHYSICAL EXAM:  General: No acute distress.  male. EENT:  EOMI. Anicteric sclerae. MMM.  +[place]  Resp:  Clear bilaterally, no wheezing or rales. No accessory muscle use  CV:  RRR  No edema  GI:  Soft, Non distended, Non tender.  +bowel sounds   Neurologic:  Alert and oriented to person, normal speech,   Psych:   Little to no insight. Not anxious, not agitated  Skin:  No rashes. No jaundice    Reviewed most current lab test results and cultures  YES  Reviewed most current radiology test results   YES  Review and summation of old records today    NO  Reviewed patient's current orders and MAR    YES  PMH/ reviewed - no change compared to H&P  ________________________________________________________________________  Care Plan discussed with:    Comments   Patient x    Family      RN x    Care Manager x    Consultant                        Multidiciplinary team rounds were held today with , nursing, pharmacist and clinical coordinator. Patient's plan of care was discussed; medications were reviewed and discharge planning was addressed.      ________________________________________________________________________  Total NON critical care TIME:  25   Minutes    Total CRITICAL CARE TIME Spent:   Minutes non procedure based      Comments   >50% of visit spent in counseling and coordination of care     ________________________________________________________________________  Te Slaughter NP     Procedures: see electronic medical records for all procedures/Xrays and details which were not copied into this note but were reviewed prior to creation of Plan. LABS:  I reviewed today's most current labs and imaging studies. Pertinent labs include:  No results for input(s): WBC, HGB, HCT, PLT, HGBEXT, HCTEXT, PLTEXT, HGBEXT, HCTEXT, PLTEXT in the last 72 hours. No results for input(s): NA, K, CL, CO2, GLU, BUN, CREA, CA, MG, PHOS, ALB, TBIL, TBILI, SGOT, ALT, INR, INREXT, INREXT in the last 72 hours.     Signed: Te Slaughter NP

## 2019-11-29 NOTE — PROGRESS NOTES
Problem: Seizure Disorder (Adult)  Goal: *STG: Remains free of seizure activity  Outcome: Progressing Towards Goal  Goal: *STG: Maintains lab values within therapeutic range  Outcome: Progressing Towards Goal  Goal: *STG/LTG: Complies with medication therapy  Outcome: Progressing Towards Goal  Goal: *STG: Remains free of injury during seizure activity  Outcome: Progressing Towards Goal  Goal: *STG: Remains safe in hospital  Outcome: Progressing Towards Goal  Goal: Interventions  Outcome: Progressing Towards Goal     Problem: Falls - Risk of  Goal: *Absence of Falls  Description  Document Ju Sanders Fall Risk and appropriate interventions in the flowsheet. Outcome: Progressing Towards Goal  Note: Fall Risk Interventions:  Mobility Interventions: Communicate number of staff needed for ambulation/transfer    Mentation Interventions: Adequate sleep, hydration, pain control, Door open when patient unattended    Medication Interventions: Bed/chair exit alarm    Elimination Interventions: Bed/chair exit alarm, Toileting schedule/hourly rounds    History of Falls Interventions: Bed/chair exit alarm         Problem: Patient Education: Go to Patient Education Activity  Goal: Patient/Family Education  Outcome: Progressing Towards Goal     Problem: Pressure Injury - Risk of  Goal: *Prevention of pressure injury  Description  Document Sandeep Scale and appropriate interventions in the flowsheet.   Outcome: Progressing Towards Goal  Note: Pressure Injury Interventions:  Sensory Interventions: Float heels, Keep linens dry and wrinkle-free, Minimize linen layers    Moisture Interventions: Absorbent underpads, Apply protective barrier, creams and emollients    Activity Interventions: Pressure redistribution bed/mattress(bed type)    Mobility Interventions: HOB 30 degrees or less    Nutrition Interventions: Document food/fluid/supplement intake    Friction and Shear Interventions: Apply protective barrier, creams and emollients, HOB 30 degrees or less, Minimize layers                Problem: Patient Education: Go to Patient Education Activity  Goal: Patient/Family Education  Outcome: Progressing Towards Goal     Problem: Patient Education: Go to Patient Education Activity  Goal: Patient/Family Education  Outcome: Progressing Towards Goal     Problem: Patient Education: Go to Patient Education Activity  Goal: Patient/Family Education  Outcome: Progressing Towards Goal     Problem: Non-Violent Restraints  Goal: *Removal from restraints as soon as assessed to be safe  Outcome: Progressing Towards Goal  Goal: *No harm/injury to patient while restraints in use  Outcome: Progressing Towards Goal  Goal: *Patient's dignity will be maintained  Outcome: Progressing Towards Goal  Goal: *Patient Specific Goal (EDIT GOAL, INSERT TEXT)  Outcome: Progressing Towards Goal  Goal: Non-violent Restaints:Standard Interventions  Outcome: Progressing Towards Goal  Goal: Non-violent Restraints:Patient Interventions  Outcome: Progressing Towards Goal  Goal: Patient/Family Education  Outcome: Progressing Towards Goal

## 2019-11-30 PROCEDURE — 74011250637 HC RX REV CODE- 250/637: Performed by: INTERNAL MEDICINE

## 2019-11-30 PROCEDURE — 74011250636 HC RX REV CODE- 250/636: Performed by: NURSE PRACTITIONER

## 2019-11-30 PROCEDURE — 65270000015 HC RM PRIVATE ONCOLOGY

## 2019-11-30 PROCEDURE — 74011250636 HC RX REV CODE- 250/636: Performed by: INTERNAL MEDICINE

## 2019-11-30 RX ORDER — LORAZEPAM 2 MG/ML
1 INJECTION INTRAMUSCULAR
Status: DISCONTINUED | OUTPATIENT
Start: 2019-11-30 | End: 2019-12-03 | Stop reason: HOSPADM

## 2019-11-30 RX ORDER — HALOPERIDOL 5 MG/ML
5 INJECTION INTRAMUSCULAR
Status: DISCONTINUED | OUTPATIENT
Start: 2019-11-30 | End: 2019-12-03 | Stop reason: HOSPADM

## 2019-11-30 RX ADMIN — LORAZEPAM 1 MG: 2 INJECTION INTRAMUSCULAR; INTRAVENOUS at 18:10

## 2019-11-30 RX ADMIN — LACOSAMIDE 100 MG: 100 TABLET, FILM COATED ORAL at 20:46

## 2019-11-30 RX ADMIN — Medication 10 ML: at 06:42

## 2019-11-30 RX ADMIN — HEPARIN SODIUM 5000 UNITS: 5000 INJECTION INTRAVENOUS; SUBCUTANEOUS at 21:33

## 2019-11-30 RX ADMIN — HALOPERIDOL LACTATE 5 MG: 5 INJECTION INTRAMUSCULAR at 20:46

## 2019-11-30 RX ADMIN — LACOSAMIDE 100 MG: 100 TABLET, FILM COATED ORAL at 09:35

## 2019-11-30 RX ADMIN — Medication 10 ML: at 21:34

## 2019-11-30 RX ADMIN — HEPARIN SODIUM 5000 UNITS: 5000 INJECTION INTRAVENOUS; SUBCUTANEOUS at 06:42

## 2019-11-30 RX ADMIN — ASPIRIN 325 MG: 325 TABLET, FILM COATED ORAL at 09:34

## 2019-11-30 RX ADMIN — Medication 10 ML: at 15:00

## 2019-11-30 RX ADMIN — DIVALPROEX SODIUM 500 MG: 250 TABLET, EXTENDED RELEASE ORAL at 09:34

## 2019-11-30 RX ADMIN — HEPARIN SODIUM 5000 UNITS: 5000 INJECTION INTRAVENOUS; SUBCUTANEOUS at 15:00

## 2019-11-30 NOTE — PROGRESS NOTES
**Consult Information**  Member Facility: [facility] Mariana Cruz MRN: [id number]  Consult ID: [id number]  Facility Time Zone: ET  Date and Time of Consult: 11/29/2019 08:57:51 PM  Requesting Clinician: [phone]  Patient Name: Neymar Huntley  Date of Birth: [birth year]  Gender: Male    **Clinical Note**  Clinical Note: Pt agitated/restless. - received haloperidol 2mg IV at 1900 without improvement in symptoms. - will try an additional dose of haldol 5 mg IM once. **Attestation**  Interaction Mode: Electronic Interaction  Interaction Attestation: Interprofessional internet consultation was delivered through telephonic and/ or electronic communication upon the request of the patients treating physician, while the requesting and the rendering provider were not in the same physical location. Written report was provided to the requesting provider.   Evaluation Duration (mins): 2

## 2019-11-30 NOTE — PROGRESS NOTES
Oncology End of Shift Note      Bedside shift change report given to [facility], RN (incoming nurse) by Tameka De Luna (outgoing nurse) on Luann Bulls. Report included the following information SBAR, Kardex and MAR. Shift Summary: Patient received all medications. Patient did not receive haldol even though patient was restless throughout the day. Portable chest x ray was completed. Patient received a bath. Issues for Physician to Address:  None. Patient on Cardiac Monitoring?     [] Yes  [x] No    Rhythm:          Tameka De Luna

## 2019-11-30 NOTE — PROGRESS NOTES
Oncology End of Shift Note      Bedside shift change report given to [facility], RN (incoming nurse) by Nikos Bright (outgoing nurse) on [place] [place]. Report included the following information SBAR, Kardex and MAR. Shift Summary: pt confused during shift. Attempted to get out of bed multiple times, screaming and pulling on IV. Gave PRN haldol and ativan. Issues for Physician to Address:  Prn ativan?               Shift Events        Ira Calvillo

## 2019-11-30 NOTE — PROGRESS NOTES
I reviewed pertinent labs and imaging, and discussed /agreed on the plan of care with Dr. Heriberto Mortensen. Hospitalist Progress Note    NAME: Landry Membreno   :  1945   MRN:  [id number]     History of Present Illness: The patient is 66years old  man with past medical history of seizure disorder, chronic encephalopathy with underlying schizophrenia presented to emergency department due to change in his mentation as it was reported at the nursing facility. There was no family at bedside or caregiver and all history was obtained from EMR and ER doctor. They report that when the patient got here they called code stroke however he did not have any neurologic deficits and he was evaluated by tele-neurologist which he recommended to admit the patient for inpatient EEG     Assessment / Plan:  Acute encephalopathy-back to baseline  · Multifactorial   · Hx of seizure disorder; discontinue Keppra and begin Vimpat  · Work-up unremarkable  · CM working on placement/discharge planning    SÁNCHEZ- resolved  · Cr 0.82    Seizure disorder  · Discontinue Keppra and start Vimpat  · Appreciate Neurology input    HTN  · PRN metoprolol IV    Schizophrenia/Delerium  · Appreciate psychiatry input; does not meet inpatient criteria- signed off 19  · Psychiatry re-consulted 2019; signed off 2019  · Continue PRN haldol and ativan for increased agitation     25.0 - 29.9 Overweight / Body mass index is 26.13 kg/m². Code status: DNR  Prophylaxis: None indicated  Recommended Disposition: SNF/LTC   Family continues to request patient be discharged to [facility]. His niece does not feel patient is safe to go home even with 24 hour care or to a [place] funded placement. CM continues to work on placement and discharge planning      Subjective:     Chief Complaint / Reason for Physician Visit  Patient in room without any new complaints. NAD. Discussed with RN events overnight.      Review of Systems:  Symptom Y/N Comments  Symptom Y/N Comments   Fever/Chills    Chest Pain     Poor Appetite    Edema     Cough    Abdominal Pain     Sputum    Joint Pain     SOB/WILSON    Pruritis/Rash     Nausea/vomit    Tolerating PT/OT     Diarrhea    Tolerating Diet     Constipation    Other       Could NOT obtain due to: Confusion     Objective:     VITALS:   Last 24hrs VS reviewed since prior progress note. Most recent are:  Patient Vitals for the past 24 hrs:   Temp Pulse Resp BP SpO2   11/30/19 0839 97.6 °F (36.4 °C) 63 16 119/83 97 %   11/29/19 2011 98.5 °F (36.9 °C) 92 16 105/68 99 %   11/29/19 1952 98.2 °F (36.8 °C) 99 20 (!) 145/91 95 %   11/29/19 1546 97.3 °F (36.3 °C) 83 18 112/85 96 %     No intake or output data in the 24 hours ending 11/30/19 0931     PHYSICAL EXAM:  General: Elderly  male. NAD    EENT:  EOMI. Anicteric sclerae. MMM. Hard of hearing   Resp:  Lungs CTA, no wheezing or rales. No accessory muscle use  CV:  SR.  No edema  GI:  Soft, Non distended, Non tender.  +bowel sounds   Neurologic:  Alert and oriented to person, normal speech,   Psych:   Little to no insight. Not anxious, increased agitation overnight per nursing. Resting quietly with eyes closed at this time. Skin:  No rashes. No jaundice    Reviewed most current lab test results and cultures  YES  Reviewed most current radiology test results   YES  Review and summation of old records today    NO  Reviewed patient's current orders and MAR    YES  PMH/SH reviewed - no change compared to H&P  ________________________________________________________________________  Care Plan discussed with:    Comments   Patient x    Family      RN x    Care Manager x    Consultant                        Multidiciplinary team rounds were held today with , nursing, pharmacist and clinical coordinator. Patient's plan of care was discussed; medications were reviewed and discharge planning was addressed. ________________________________________________________________________  Total NON critical care TIME:  25   Minutes    Total CRITICAL CARE TIME Spent:   Minutes non procedure based      Comments   >50% of visit spent in counseling and coordination of care     ________________________________________________________________________  Radha Douglas NP     Procedures: see electronic medical records for all procedures/Xrays and details which were not copied into this note but were reviewed prior to creation of Plan. LABS:  I reviewed today's most current labs and imaging studies. Pertinent labs include:  No results for input(s): WBC, HGB, HCT, PLT, HGBEXT, HCTEXT, PLTEXT, HGBEXT, HCTEXT, PLTEXT in the last 72 hours. No results for input(s): NA, K, CL, CO2, GLU, BUN, CREA, CA, MG, PHOS, ALB, TBIL, TBILI, SGOT, ALT, INR, INREXT, INREXT in the last 72 hours.     Signed: Radha Douglas NP

## 2019-12-01 PROCEDURE — 74011250636 HC RX REV CODE- 250/636: Performed by: INTERNAL MEDICINE

## 2019-12-01 PROCEDURE — 65270000015 HC RM PRIVATE ONCOLOGY

## 2019-12-01 PROCEDURE — 74011250637 HC RX REV CODE- 250/637: Performed by: INTERNAL MEDICINE

## 2019-12-01 PROCEDURE — 74011250636 HC RX REV CODE- 250/636: Performed by: NURSE PRACTITIONER

## 2019-12-01 RX ADMIN — LACOSAMIDE 100 MG: 100 TABLET, FILM COATED ORAL at 22:58

## 2019-12-01 RX ADMIN — Medication 10 ML: at 14:23

## 2019-12-01 RX ADMIN — HEPARIN SODIUM 5000 UNITS: 5000 INJECTION INTRAVENOUS; SUBCUTANEOUS at 14:23

## 2019-12-01 RX ADMIN — ASPIRIN 325 MG: 325 TABLET, FILM COATED ORAL at 09:38

## 2019-12-01 RX ADMIN — ALLOPURINOL 100 MG: 100 TABLET ORAL at 09:38

## 2019-12-01 RX ADMIN — HALOPERIDOL LACTATE 5 MG: 5 INJECTION INTRAMUSCULAR at 19:35

## 2019-12-01 RX ADMIN — LACOSAMIDE 100 MG: 100 TABLET, FILM COATED ORAL at 09:38

## 2019-12-01 RX ADMIN — LORAZEPAM 1 MG: 2 INJECTION INTRAMUSCULAR; INTRAVENOUS at 22:58

## 2019-12-01 RX ADMIN — DIVALPROEX SODIUM 500 MG: 250 TABLET, EXTENDED RELEASE ORAL at 09:38

## 2019-12-01 RX ADMIN — Medication 10 ML: at 22:00

## 2019-12-01 RX ADMIN — Medication 10 ML: at 06:07

## 2019-12-01 RX ADMIN — HEPARIN SODIUM 5000 UNITS: 5000 INJECTION INTRAVENOUS; SUBCUTANEOUS at 06:07

## 2019-12-01 RX ADMIN — HEPARIN SODIUM 5000 UNITS: 5000 INJECTION INTRAVENOUS; SUBCUTANEOUS at 22:57

## 2019-12-01 NOTE — PROGRESS NOTES
Problem: Falls - Risk of  Goal: *Absence of Falls  Description  Document Rolando Avalos Fall Risk and appropriate interventions in the flowsheet.   Outcome: Progressing Towards Goal  Note: Fall Risk Interventions:  Mobility Interventions: Communicate number of staff needed for ambulation/transfer    Mentation Interventions: Adequate sleep, hydration, pain control    Medication Interventions: Bed/chair exit alarm    Elimination Interventions: Bed/chair exit alarm    History of Falls Interventions: Bed/chair exit alarm

## 2019-12-01 NOTE — PROGRESS NOTES
Oncology End of Shift Note      Bedside shift change report given to Ching Whitley RN (incoming nurse) by Saintclair Mead (outgoing nurse) on [facility]. Report included the following information SBAR, Kardex and MAR. Shift Summary: Patient received all medications. Patient was getting restless later in the day and one dose of ativan was administered. Patient was cleaned up throughout the day. Heel boots were reapplied to patient's feet. Issues for Physician to Address:  None. Patient on Cardiac Monitoring?     [] Yes  [x] No    Rhythm:          Saintclair Mead

## 2019-12-01 NOTE — PROGRESS NOTES
Oncology End of Shift Note      Bedside shift change report given to [facility] DANNA COLLIER (incoming nurse) by Mia Lauren RN (outgoing nurse) on Rebecca Beam. Report included the following information SBAR, Kardex and Intake/Output. Shift Summary: Pt slept intermittently during night, given 1 dose of haldol for agitation. Incontinent of urine, wears diaper and requires 2 staff to turn and change him. Issues for Physician to Address:  --     Patient on Cardiac Monitoring?     [] Yes  [x] No    Rhythm:          Shift Events  --      Mia Lauren RN

## 2019-12-01 NOTE — PROGRESS NOTES
I reviewed pertinent labs and imaging, and discussed /agreed on the plan of care with Dr. Pelon Stephen. Hospitalist Progress Note    NAME: Car Stallings   :  1945   MRN:  [id number]     History of Present Illness: The patient is 66years old  man with past medical history of seizure disorder, chronic encephalopathy with underlying schizophrenia presented to emergency department due to change in his mentation as it was reported at the nursing facility. There was no family at bedside or caregiver and all history was obtained from EMR and ER doctor. They report that when the patient got here they called code stroke however he did not have any neurologic deficits and he was evaluated by tele-neurologist which he recommended to admit the patient for inpatient EEG     Assessment / Plan:  Acute encephalopathy-back to baseline  · Multifactorial   · Hx of seizure disorder; discontinue Keppra and begin Vimpat  · Work-up unremarkable  · CM working on placement/discharge planning    SÁNCHEZ- resolved  · Cr 0.82    Seizure disorder  · Discontinue Keppra and start Vimpat  · Appreciate Neurology input    HTN  · PRN metoprolol IV    Schizophrenia/Delerium  · Appreciate psychiatry input; does not meet inpatient criteria- signed off 19  · Psychiatry re-consulted 2019; signed off 2019  · Continue PRN haldol and ativan for increased agitation     25.0 - 29.9 Overweight / Body mass index is 26.13 kg/m². Code status: DNR  Prophylaxis: None indicated  Recommended Disposition: SNF/LTC   Family continues to request patient be discharged to [facility]. His niece does not feel patient is safe to go home even with 24 hour care or to a [facility] funded placement. CM continues to work on placement and discharge planning      Subjective:     Chief Complaint / Reason for Physician Visit  Patient resting in room quietly. No new complaints. NAD. Discussed with RN events overnight.      Review of Systems:  Symptom Y/N Comments  Symptom Y/N Comments   Fever/Chills    Chest Pain     Poor Appetite    Edema     Cough    Abdominal Pain     Sputum    Joint Pain     SOB/WILSON    Pruritis/Rash     Nausea/vomit    Tolerating PT/OT     Diarrhea    Tolerating Diet     Constipation    Other       Could NOT obtain due to: Confusion     Objective:     VITALS:   Last 24hrs VS reviewed since prior progress note. Most recent are:  Patient Vitals for the past 24 hrs:   Temp Pulse Resp BP SpO2   12/01/19 0807 97.7 °F (36.5 °C) 73 18 147/78 96 %   12/01/19 0350 97.5 °F (36.4 °C) 82 18 154/69 96 %   11/30/19 2042 97.8 °F (36.6 °C) 75 18 124/88 97 %   11/30/19 1535 98.5 °F (36.9 °C) 75 16 139/75 98 %     No intake or output data in the 24 hours ending 12/01/19 0848     PHYSICAL EXAM:  General: No acute distress. Elderly  male. EENT:  EOMI. Anicteric sclerae. MMM. +[place]  Resp:  CTA bilaterally, no wheezing or rales. No accessory muscle use  CV:  RRR.  No edema  GI:  Soft, Non distended, Non tender.  +bowel sounds   Neurologic:  Alert and oriented to person, normal speech,   Psych:   Little to no insight. Not anxious, not agitated at this time. Skin:  No rashes. No jaundice    Reviewed most current lab test results and cultures  YES  Reviewed most current radiology test results   YES  Review and summation of old records today    NO  Reviewed patient's current orders and MAR    YES  PMH/SH reviewed - no change compared to H&P  ________________________________________________________________________  Care Plan discussed with:    Comments   Patient x    Family      RN x    Care Manager     Consultant                        Multidiciplinary team rounds were held today with , nursing, pharmacist and clinical coordinator. Patient's plan of care was discussed; medications were reviewed and discharge planning was addressed.      ________________________________________________________________________  Total NON critical care TIME: 25   Minutes    Total CRITICAL CARE TIME Spent:   Minutes non procedure based      Comments   >50% of visit spent in counseling and coordination of care     ________________________________________________________________________  Danilo Sam NP     Procedures: see electronic medical records for all procedures/Xrays and details which were not copied into this note but were reviewed prior to creation of Plan. LABS:  I reviewed today's most current labs and imaging studies. Pertinent labs include:  No results for input(s): WBC, HGB, HCT, PLT, HGBEXT, HCTEXT, PLTEXT, HGBEXT, HCTEXT, PLTEXT in the last 72 hours. No results for input(s): NA, K, CL, CO2, GLU, BUN, CREA, CA, MG, PHOS, ALB, TBIL, TBILI, SGOT, ALT, INR, INREXT, INREXT in the last 72 hours.     Signed: Danilo Sam NP

## 2019-12-02 PROCEDURE — 94760 N-INVAS EAR/PLS OXIMETRY 1: CPT

## 2019-12-02 PROCEDURE — 74011250637 HC RX REV CODE- 250/637: Performed by: INTERNAL MEDICINE

## 2019-12-02 PROCEDURE — 74011250636 HC RX REV CODE- 250/636: Performed by: INTERNAL MEDICINE

## 2019-12-02 PROCEDURE — 97535 SELF CARE MNGMENT TRAINING: CPT

## 2019-12-02 PROCEDURE — 97530 THERAPEUTIC ACTIVITIES: CPT

## 2019-12-02 PROCEDURE — 74011250636 HC RX REV CODE- 250/636: Performed by: NURSE PRACTITIONER

## 2019-12-02 PROCEDURE — 65270000015 HC RM PRIVATE ONCOLOGY

## 2019-12-02 RX ORDER — LACOSAMIDE 100 MG/1
100 TABLET ORAL EVERY 12 HOURS
Qty: 30 TAB | Refills: 0 | Status: SHIPPED | OUTPATIENT
Start: 2019-12-02

## 2019-12-02 RX ORDER — ALLOPURINOL 100 MG/1
100 TABLET ORAL DAILY
Qty: 30 TAB | Refills: 0 | Status: SHIPPED | OUTPATIENT
Start: 2019-12-03

## 2019-12-02 RX ADMIN — HEPARIN SODIUM 5000 UNITS: 5000 INJECTION INTRAVENOUS; SUBCUTANEOUS at 21:51

## 2019-12-02 RX ADMIN — Medication 10 ML: at 06:00

## 2019-12-02 RX ADMIN — HEPARIN SODIUM 5000 UNITS: 5000 INJECTION INTRAVENOUS; SUBCUTANEOUS at 15:32

## 2019-12-02 RX ADMIN — ALLOPURINOL 100 MG: 100 TABLET ORAL at 08:00

## 2019-12-02 RX ADMIN — LACOSAMIDE 100 MG: 100 TABLET, FILM COATED ORAL at 20:00

## 2019-12-02 RX ADMIN — LACOSAMIDE 100 MG: 100 TABLET, FILM COATED ORAL at 08:00

## 2019-12-02 RX ADMIN — HALOPERIDOL LACTATE 5 MG: 5 INJECTION INTRAMUSCULAR at 19:59

## 2019-12-02 RX ADMIN — Medication 10 ML: at 21:50

## 2019-12-02 RX ADMIN — DIVALPROEX SODIUM 500 MG: 250 TABLET, EXTENDED RELEASE ORAL at 08:00

## 2019-12-02 RX ADMIN — ASPIRIN 325 MG: 325 TABLET, FILM COATED ORAL at 08:00

## 2019-12-02 RX ADMIN — Medication 10 ML: at 14:00

## 2019-12-02 RX ADMIN — LORAZEPAM 1 MG: 2 INJECTION INTRAMUSCULAR; INTRAVENOUS at 21:50

## 2019-12-02 NOTE — PROGRESS NOTES
Problem: Mobility Impaired (Adult and Pediatric)  Goal: *Acute Goals and Plan of Care (Insert Text)  Description  FUNCTIONAL STATUS PRIOR TO ADMISSION: patient very confused and unable to provide PLOF. Patient reports possibly ambulating with SPC vs RW. HOME SUPPORT PRIOR TO ADMISSION: The patient lives at SNF per chart. Patient unable to provide information. Physical Therapy Goals  Revised and continued 12/2/19  1. Patient will move from supine to sit and sit to supine , scoot up and down and roll side to side in bed with moderate assistance  within 7 day(s). 2.  Patient will transfer from bed to chair and chair to bed with moderate assistance using the least restrictive device within 7 day(s). 3.  Patient will perform sit to stand with moderate assistance  within 7 day(s). 4.  Patient will ambulate with moderate assistance  for 10 feet with the least restrictive device within 7 day(s). Revised 11/29/2019  1. Patient will move from supine to sit and sit to supine , scoot up and down and roll side to side in bed with moderate assistance  within 7 day(s). 2.  Patient will transfer from bed to chair and chair to bed with moderate assistance using the least restrictive device within 7 day(s). 3.  Patient will perform sit to stand with moderate assistance  within 7 day(s). 4.  Patient will ambulate with moderate assistance  for 10 feet with the least restrictive device within 7 day(s). Physical Therapy Goals  Goals reassessed 23/72/9931, remain applicable and appropriate, continue for additional week  Initiated 11/15/2019  1. Patient will move from supine to sit and sit to supine , scoot up and down and roll side to side in bed with supervision/set-up within 7 day(s). 2.  Patient will transfer from bed to chair and chair to bed with minimal assistance/contact guard assist using the least restrictive device within 7 day(s).   3.  Patient will perform sit to stand with minimal assistance/contact guard assist within 7 day(s). 4.  Patient will ambulate with minimal assistance/contact guard assist for 50 feet with the least restrictive device within 7 day(s). Outcome: Progressing Towards Goal   PHYSICAL THERAPY TREATMENT: WEEKLY REASSESSMENT  Patient: Saul Johns (1 y.o. male)  Date: 12/2/2019  Primary Diagnosis: Seizure [facility]) [R56.9]        Precautions:   Fall, Bed Alarm      ASSESSMENT  Patient continues with skilled PT services and is progressing towards goals. Patient continues to require total assistance x2 for hygiene and bed mobility and supine<>sit. He demonstrates the ability to maintain sitting balance with bilateral UE supported on rolling walker for 1-2 minutes before beginning to lean posteriorly. Patient is unable to eat using bilateral UE and maintain sitting balance. He reports seeing knives in the trash can while seated EOB. Standing trial deferred secondary to patient fatigue and decreased sitting balance. Patient's progression toward goals since last assessment: Patient continues to need total assistance x2 for mobility, he is confused and reports seeing objects that are not present, he does not alert therapy that he has been incontinent on arrival    Current Level of Function Impacting Discharge (mobility/balance): total Ax2    Functional Outcome Measure: The patient scored 10/100 on the Barthel outcome measure which is indicative of the need for assistance with at least 90% of ADLs and IADLs. Other factors to consider for discharge: medical stability, decreased functional independence          PLAN :  Goals have been updated based on progression since last assessment. Patient continues to benefit from skilled intervention to address the above impairments.     Recommendations and Planned Interventions: bed mobility training, transfer training, gait training, therapeutic exercises, neuromuscular re-education, edema management/control, patient and family training/education, and therapeutic activities      Frequency/Duration: Patient will be followed by physical therapy:  3 times a week to address goals. Recommendation for discharge: (in order for the patient to meet his/her long term goals)  To be determined: SNF v. LTC     This discharge recommendation:  Has been made in collaboration with the attending provider and/or case management    IF patient discharges home will need the following DME: patient would benefit from wheelchair if he continues to be immobile at time of D/C         SUBJECTIVE:   Patient stated what are those knives doing in there.     OBJECTIVE DATA SUMMARY:   HISTORY:    Past Medical History:   Diagnosis Date    CAD (coronary artery disease)     Gout     Hypertension     Schizophrenia ([facility]     Seizures ([facility]    History reviewed. No pertinent surgical history. Personal factors and/or comorbidities impacting plan of care: please see above    Home Situation  Patient Expects to be Discharged to[de-identified] Private residence    EXAMINATION/PRESENTATION/DECISION MAKING:   Critical Behavior:  Neurologic State: Alert, Eyes open spontaneously, Confused  Orientation Level: Oriented to person  Cognition: Impulsive, Poor safety awareness  Safety/Judgement: Decreased insight into deficits, Decreased awareness of need for safety, Decreased awareness of need for assistance, Decreased awareness of environment, Fall prevention  Hearing: Auditory  Auditory Impairment: None  Skin:    Edema:   Range Of Motion:  AROM: Generally decreased, functional                       Strength:    Strength: Generally decreased, functional                    Tone & Sensation:   Tone: Normal                              Coordination:  Coordination: Generally decreased, functional  Vision:      Functional Mobility:  Bed Mobility:     Supine to Sit: Total assistance  Sit to Supine: Total assistance  Scooting:  Total assistance  Transfers:                             Balance: Sitting: Impaired; With support  Sitting - Static: Fair (occasional); Poor (constant support)  Sitting - Dynamic: Poor (constant support)  Ambulation/Gait Training:                                                         Stairs: Therapeutic Exercises:       Functional Measure:  Barthel Index:    Bathin  Bladder: 0  Bowels: 5  Groomin  Dressin  Feedin  Mobility: 0  Stairs: 0  Toilet Use: 0  Transfer (Bed to Chair and Back): 0  Total: 10/100       The Barthel ADL Index: Guidelines  1. The index should be used as a record of what a patient does, not as a record of what a patient could do. 2. The main aim is to establish degree of independence from any help, physical or verbal, however minor and for whatever reason. 3. The need for supervision renders the patient not independent. 4. A patient's performance should be established using the best available evidence. Asking the patient, friends/relatives and nurses are the usual sources, but direct observation and common sense are also important. However direct testing is not needed. 5. Usually the patient's performance over the preceding 24-48 hours is important, but occasionally longer periods will be relevant. 6. Middle categories imply that the patient supplies over 50 per cent of the effort. 7. Use of aids to be independent is allowed. Ajit Lane., Barthel, D.W. (). Functional evaluation: the Barthel Index. [address] [facility] (14)2. RADHA Dixon, Brandi Mayfield., Stephenson Prim., Berry, [address] [place] [address] (). Measuring the change indisability after inpatient rehabilitation; comparison of the responsiveness of the Barthel Index and Functional Brown Measure. Journal of Neurology, Neurosurgery, and Psychiatry, 66(4), 825-456. SEBASTIAN Infante.MARITA, AYSE Chandler, & Gera Paul, MKhurramA. (2004.) Assessment of post-stroke quality of life in cost-effectiveness studies: The usefulness of the Barthel Index and the EuroQoL-5D. Quality of Life Research, 13, 266-73           Pain Rating:      Activity Tolerance:   Fair  Please refer to the flowsheet for vital signs taken during this treatment. After treatment patient left in no apparent distress:   Supine in bed, Call bell within reach, Bed / chair alarm activated, and Side rails x 3    COMMUNICATION/EDUCATION:   The patients plan of care was discussed with: Occupational Therapist, Registered Nurse, and Rehabilitation Attendant. Fall prevention education was provided and the patient/caregiver indicated understanding., Patient/family have participated as able in goal setting and plan of care. , and Patient/family agree to work toward stated goals and plan of care.     Thank you for this referral.  Truman Chavez, PT   Time Calculation: 30 mins

## 2019-12-02 NOTE — PROGRESS NOTES
Oncology End of Shift Note      Bedside shift change report given to Johnathon Aguilera RN (incoming nurse) by Radha Coy RN (outgoing nurse) on [place]. Report included the following information SBAR, Kardex and MAR. Shift Summary:   Patient tolerated care well through out shift. Patient received all scheduled medications. Patient ate all meals, incontinence care and bathing completed. Patient able to be easily redirected with the increased confusion and impulsiveness. Upon giving report, increased agitation and restlessness. Hourly rounding completed. Issues for Physician to Address:       Patient on Cardiac Monitoring?     [] Yes  [x] No    Rhythm:          Shift Events        Radha Coy RN

## 2019-12-02 NOTE — DISCHARGE SUMMARY
Hospitalist Discharge Summary     Patient ID:  Vane Orozco  [id number]  3 y.o.  1945 11/14/2019    PCP on record: Mandy Dinh MD    Admit date: 11/14/2019  Discharge date and time: 12/2/2019    DISCHARGE DIAGNOSIS:    Active Hospital Problems    Diagnosis Date Noted    Acute encephalopathy 11/26/2019    SÁNCHEZ (acute kidney injury) ([facility] 11/26/2019    HTN (hypertension) 11/26/2019    Schizophrenia ([facility] 11/26/2019    Seizure disorder ([facility] 11/14/2019       CONSULTATIONS:  IP CONSULT TO HOSPITALIST  IP CONSULT TO NEUROLOGY  IP CONSULT TO PSYCHIATRY  IP CONSULT TO PALLIATIVE CARE - PROVIDER  IP CONSULT TO PSYCHIATRY    Excerpted HPI from H&P of Shay Haney MD:  \"The patient is 66years old  man with past medical history of seizure disorder, chronic encephalopathy with underlying schizophrenia presented to emergency department due to change in his mentation as it was reported at the nursing facility. There was no family at bedside or caregiver and all history was obtained from EMR and ER doctor. They report that when the patient got here they called code stroke however he did not have any neurologic deficits and he was evaluated by tele-neurologist which he recommended to admit the patient for inpatient EEG. \"  ______________________________________________________________________  DISCHARGE SUMMARY/HOSPITAL COURSE:  for full details see H&P, daily progress notes, labs, consult notes.    Eriberto Gayle y.o. male was admitted to [facility] on 11/14/2019 and treated for the following medical complaints:     Acute encephalopathy likely d/t seizures and/or delirium- back to baseline  · Multifactorial   · Hx of seizure disorder; discontinue Keppra and begin Vimpat  · Work-up unremarkable  · CM working on placement/discharge planning     SÁNCHEZ- resolved  · Cr 0.82     Seizure disorder  · Discontinue Keppra and start Vimpat  · Appreciate Neurology input     HTN  · PRN metoprolol IV     Schizophrenia/Delerium  · Appreciate psychiatry input; does not meet inpatient criteria- signed off 11/19/19  · Psychiatry re-consulted 11/25/2019; signed off 11/26/2019  · Continue PRN haldol and ativan for increased agitation     Patient's plan of care has been reviewed with them. Patient and/or family have verbally conveyed their understanding and agreement of the patient's signs, symptoms, diagnosis, treatment and prognosis and additionally agree to follow up as recommended or return to [facility] should their condition change prior to follow-up. Discharge instructions have also been provided to the patient with some educational information regarding their diagnosis as well a list of reasons why they would want to return to the office prior to their follow-up appointment should their condition change. Mason Carrillo to avoid frequent ED visits. [facility] can treat; pains, sprains, cuts, wounds, high fevers, upper respiratory infections and much more. There medical team is equipped with all the tools necessary to provide advanced medical care in the comfort of you home, workplace, or location of need. The medical team consists of doctors, nurse practitioners, and EMTs. [facility] is available 7 days per week 9 am to 9 pm.   Request care by calling [phone] or by going online at [facility]  ______________________________________________________________________  Patient seen and examined by me on discharge day. Pertinent Findings:  Gen:    Not in distress  Chest: Clear lungs  CVS:   Regular rhythm. No edema  Abd:  Soft, not distended, not tender  Neuro:  Alert, oriented to self   _______________________________________________________________________  DISCHARGE MEDICATIONS:   Current Discharge Medication List      START taking these medications    Details   lacosamide (VIMPAT) 100 mg tab tablet Take 1 Tab by mouth every twelve (12) hours. Max Daily Amount: 200 mg. Qty: 30 Tab, Refills: 0    Associated Diagnoses: Seizure disorder ([address]         CONTINUE these medications which have CHANGED    Details   allopurinol (ZYLOPRIM) 100 mg tablet Take 1 Tab by mouth daily. Qty: 30 Tab, Refills: 0         CONTINUE these medications which have NOT CHANGED    Details   cyanocobalamin (VITAMIN B12) 500 mcg tablet Take 1,000 mcg by mouth daily. cholecalciferol (VITAMIN D3) (1000 Units /25 mcg) tablet Take 2,000 Units by mouth daily. 2 x 1000 units      amLODIPine (NORVASC) 10 mg tablet Take 10 mg by mouth daily. hydrOXYzine HCl (ATARAX) 10 mg tablet Take 10 mg by mouth two (2) times daily as needed for Anxiety. loratadine (CLARITIN) 10 mg tablet Take 10 mg by mouth daily. lisinopril (PRINIVIL, ZESTRIL) 40 mg tablet Take 20 mg by mouth daily. 1/2 of 40mg tab      PALIPERIDONE PALMITATE IM 78 mg by IntraMUSCular route every month. Pt gets this every 4 weeks for schizophrenia       divalproex ER (DEPAKOTE ER) 500 mg ER tablet Take 1,000 mg by mouth daily. metoprolol tartrate (LOPRESSOR) 50 mg tablet Take 50 mg by mouth two (2) times a day. aspirin (ASPIRIN) 325 mg tablet Take 325 mg by mouth daily. Patient Follow Up Instructions: Activity: Activity as tolerated  Diet: Regular Diet  Wound Care: None needed    Follow-up with PCP in 1 week. Follow-up Information     Follow up With Specialties Details Why Contact Info    Other, MD Mandy    Patient can only remember the practice name and not the physician      [facility]   [facility] [address] [facility]  [phone]        ________________________________________________________________    Risk of deterioration: Moderate    Condition at Discharge:  Stable  __________________________________________________________________    Disposition  Home with family and home health services    ____________________________________________________________________    Code Status: DNR/DNI  ___________________________________________________________________      Total time in minutes spent coordinating this discharge (includes going over instructions, follow-up, prescriptions, and preparing report for sign off to her PCP) :  31 minutes    Signed:  Sherry Rene NP

## 2019-12-02 NOTE — PROGRESS NOTES
Problem: Self Care Deficits Care Plan (Adult)  Goal: *Acute Goals and Plan of Care (Insert Text)  Description    FUNCTIONAL STATUS PRIOR TO ADMISSION: Unable to obtain this session as Pt is not reliable historian. Says he uses a cane and RW yet unsure as to accuracy of statement. HOME SUPPORT: Per chart, pt resides at nursing home or group home, yet unclear. Will update as more information is gleaned. Occupational Therapy Goals  Initiated 11/15/2019 Goals reviewed 11-22, no progress continue x 1 week, may need to decrease service; Goals reviewed 12/2/2019, goals updated due to minimal progress. 1.  Patient will perform self-feeding with supervision/set-up within 7 day(s). Continue  2. Patient will perform grooming tasks sitting unsupported at EOB with supervision/set-up within 7 day(s). Continue  3. Patient will perform upper body dressing with supervision/set-up within 7 day(s). Downgrade to min A  4. Patient will perform lower body dressing with minimal assistance within 7 days. Downgrade to mod A  5. Patient will perform toilet transfers with CGA using least restrictive device within 7 day(s). Downgrade to max A   6. Patient will perform all aspects of toileting with minimal assistance within 7 day(s). Continue  Goal added 12/2/2019;   7. Patient will tolerate sitting EOB for 5 minutes unsupported with CGA while performing functional task within 7 days. Outcome: Progressing Towards Goal     OCCUPATIONAL THERAPY TREATMENT/WEEKLY RE-ASSESSMENT  Patient: Katherine Runner (1 y.o. male)  Date: 12/2/2019  Diagnosis: Seizure ([facility] [R56.9]   Acute encephalopathy       Precautions: Fall, Bed Alarm  Chart, occupational therapy assessment, plan of care, and goals were reviewed. ASSESSMENT  Patient continues with skilled OT services and is making slow progress towards goals. Patient remains limited by confusion, decreased sitting balance, decreased awareness, and generalized weakness.  He requires max to total assistance x2 for rolling in bed for toileting hygiene and clothing management, with constant verbal cues for sequencing. At EOB, patient with initial improvement in sitting balance, but is unable to participate in functional task without heavy posterior lean. He is able to manage self-feeding task with overall CGA/min A. Will continue to see patient per POC. Goals updated above. Current Level of Function Impacting Discharge (ADLs): min to max/total A for ADLs    Other factors to consider for discharge: unclear PLOF, mental health history, confusion, poor safety awareness         PLAN :  Goals have been updated based on progression since last assessment. Patient continues to benefit from skilled intervention to address the above impairments. Continue to follow patient 3 times a week to address goals. Recommend with staff: bedpan for toileting, raise HOB for meals    Recommend next OT session: continue with activity at EOB; simple grooming task    Recommendation for discharge: (in order for the patient to meet his/her long term goals)  Therapy up to 5 days/week in SNF setting vs. LTC    This discharge recommendation:  Has been made in collaboration with the attending provider and/or case management    IF patient discharges home will need the following DME: to be determined (TBD)       SUBJECTIVE:   Patient stated Kirk Hollis.     OBJECTIVE DATA SUMMARY:   Cognitive/Behavioral Status:  Neurologic State: Alert;Confused     Cognition: Poor safety awareness; Impulsive; Impaired decision making  Perception: Cues to maintain midline in sitting  Perseveration: No perseveration noted  Safety/Judgement: Decreased awareness of environment;Decreased awareness of need for assistance;Decreased awareness of need for safety; Lack of insight into deficits    Functional Mobility and Transfers for ADLs:  Bed Mobility:  Supine to Sit: Total assistance  Sit to Supine: Total assistance  Scooting:  Total assistance    Transfers:  Not attempted this session for safety    Balance:  Sitting: Impaired; With support  Sitting - Static: Fair (occasional); Poor (constant support)  Sitting - Dynamic: Poor (constant support)    ADL Intervention:  Feeding  Container Management: Moderate assistance(assist to open applesauce; able to hold it with L hand)  Utensil Management: Contact guard assistance;Minimum assistance(initial assist to orient spoon; improved with time)  Food to Mouth: Supervision(with spoon to bring applesauce to mouth)  Cues: Physical assistance; Tactile cues provided;Verbal cues provided;Visual cues provided    Upper [address]: Moderate assistance;Maximum assistance  Cues: Physical assistance; Tactile cues provided;Verbal cues provided;Visual cues provided    Toileting  Toileting Assistance: (rolling in bed to R/L due to incontinence)  Bladder Hygiene: Maximum assistance  Bowel Hygiene: Total assistance (dependent)  Cues: Verbal cues provided(physical assist for hygiene and brief management)  Adaptive Equipment: Other (comment)(bed rails)    Cognitive Retraining  Safety/Judgement: Decreased awareness of environment;Decreased awareness of need for assistance;Decreased awareness of need for safety; Lack of insight into deficits    Pain:  Pt denied pain this session. Activity Tolerance:   Fair, Poor and SpO2 stable on RA  Please refer to the flowsheet for vital signs taken during this treatment.     After treatment patient left in no apparent distress:   Supine in bed, Call bell within reach, Bed / chair alarm activated and Side rails x 3    COMMUNICATION/COLLABORATION:   The patients plan of care was discussed with: Physical Therapist and Registered Nurse    Delroy Latif OT  Time Calculation: 31 mins

## 2019-12-02 NOTE — PROGRESS NOTES
Physical Therapy Note    Per nursing staff patient has required Haldol and Ativan secondary to agitation over night. Patient is currently in bed sleeping soundly. Will re-attempt PT tx as able.      Thank you,  Jannet PATELT

## 2019-12-02 NOTE — PROGRESS NOTES
verified that [facility] will transport patient to [address] [facility]    Phone Number - [phone]    The Plan for Transition of Care is related to the following treatment goals:Return to  [place] [address] with [facility] and increased services planned from [facility]    The Patient and/or patient representative, balaji -Maximino Li -  was provided with a choice of provider and agrees   with the discharge plan. [x] Yes [] No    Freedom of choice list was provided with basic dialogue that supports the patient's individualized plan of care/goals, treatment preferences and shares the quality data associated with the providers. [] Yes [x] No  Patient has VA Benefits and all services are paid through the [facility].   Balaji is aware and in agreement with home health through the Carole Lewis RN, BSN, [address] [place] [address]     [phone]

## 2019-12-02 NOTE — DISCHARGE INSTRUCTIONS
HOSPITALIST DISCHARGE INSTRUCTIONS    NAME: Lucy Gustafson   :  1945   MRN:  [id number]     Date/Time:  2019 1:19 PM    ADMIT DATE: 2019   DISCHARGE DATE: 2019     Attending Physician: Radha Douglas NP    DISCHARGE DIAGNOSIS:  FRANCES/Hiwot [address] Problems    Diagnosis Date Noted    Acute encephalopathy 2019    S[facility]NCHEZ (acute kidney [facility]) ([facility] 2019    HTN (hypertension) 2019    Schizophrenia ([facility] 2019    Seizure disorder ([facility] 2019     Medications: Per above medication reconciliation. Pain Management: per above medications    Recommended diet: Regular Diet    Recommended activity: Activity as tolerated    Wound care: None    Indwelling devices:  None    Supplemental Oxygen: None    Code status: DNR        Outside physician follow up: Follow-up Information     Follow up With Specialties Details Why Contact Info    Other, MD Mandy    Patient can only remember the practice name and not the physician      [facility]   [place] [facility]  [phone]          Information obtained by :  I understand that if any problems occur once I am at home I am to contact my physician. I understand and acknowledge receipt of the instructions indicated above.                                                                                                                                            Physician's or R.N.'s Signature                                                                  Date/Time                                                                                                                                              Patient or Repres

## 2019-12-02 NOTE — PROGRESS NOTES
Occupational Therapy:  12/02/19    Chart reviewed for OT tx, discussed with treating PT. Patient currently sleeping soundly after receiving Haldol and Ativan secondary to agitation overnight. Will re-attempt this afternoon as able and appropriate.      Thank you,  Ronaldo Zuluaga, OTR/L

## 2019-12-03 VITALS
HEART RATE: 72 BPM | OXYGEN SATURATION: 93 % | SYSTOLIC BLOOD PRESSURE: 136 MMHG | BODY MASS INDEX: 26.07 KG/M2 | RESPIRATION RATE: 18 BRPM | WEIGHT: 182.1 LBS | HEIGHT: 70 IN | DIASTOLIC BLOOD PRESSURE: 81 MMHG | TEMPERATURE: 96.3 F

## 2019-12-03 LAB
ALBUMIN SERPL-MCNC: 2.6 G/DL (ref 3.5–5)
ALBUMIN/GLOB SERPL: 0.7 {RATIO} (ref 1.1–2.2)
ALP SERPL-CCNC: 74 U/L (ref 45–117)
ALT SERPL-CCNC: 80 U/L (ref 12–78)
ANION GAP SERPL CALC-SCNC: 5 MMOL/L (ref 5–15)
AST SERPL-CCNC: 51 U/L (ref 15–37)
BASOPHILS # BLD: 0 K/UL (ref 0–0.1)
BASOPHILS NFR BLD: 1 % (ref 0–1)
BILIRUB SERPL-MCNC: 1 MG/DL (ref 0.2–1)
BUN SERPL-MCNC: 29 MG/DL (ref 6–20)
BUN/CREAT SERPL: 39 (ref 12–20)
CALCIUM SERPL-MCNC: 9 MG/DL (ref 8.5–10.1)
CHLORIDE SERPL-SCNC: 107 MMOL/L (ref 97–108)
CO2 SERPL-SCNC: 28 MMOL/L (ref 21–32)
CREAT SERPL-MCNC: 0.74 MG/DL (ref 0.7–1.3)
DIFFERENTIAL METHOD BLD: ABNORMAL
EOSINOPHIL # BLD: 0.3 K/UL (ref 0–0.4)
EOSINOPHIL NFR BLD: 6 % (ref 0–7)
ERYTHROCYTE [DISTWIDTH] IN BLOOD BY AUTOMATED COUNT: 14.3 % (ref 11.5–14.5)
GLOBULIN SER CALC-MCNC: 3.7 G/DL (ref 2–4)
GLUCOSE SERPL-MCNC: 83 MG/DL (ref 65–100)
HCT VFR BLD AUTO: 36.2 % (ref 36.6–50.3)
HGB BLD-MCNC: 12 G/DL (ref 12.1–17)
IMM GRANULOCYTES # BLD AUTO: 0 K/UL (ref 0–0.04)
IMM GRANULOCYTES NFR BLD AUTO: 1 % (ref 0–0.5)
LYMPHOCYTES # BLD: 0.9 K/UL (ref 0.8–3.5)
LYMPHOCYTES NFR BLD: 19 % (ref 12–49)
MCH RBC QN AUTO: 31 PG (ref 26–34)
MCHC RBC AUTO-ENTMCNC: 33.1 G/DL (ref 30–36.5)
MCV RBC AUTO: 93.5 FL (ref 80–99)
MONOCYTES # BLD: 0.3 K/UL (ref 0–1)
MONOCYTES NFR BLD: 8 % (ref 5–13)
NEUTS SEG # BLD: 2.9 K/UL (ref 1.8–8)
NEUTS SEG NFR BLD: 65 % (ref 32–75)
NRBC # BLD: 0 K/UL (ref 0–0.01)
NRBC BLD-RTO: 0 PER 100 WBC
PLATELET # BLD AUTO: 250 K/UL (ref 150–400)
PMV BLD AUTO: 9.7 FL (ref 8.9–12.9)
POTASSIUM SERPL-SCNC: 3.9 MMOL/L (ref 3.5–5.1)
PROT SERPL-MCNC: 6.3 G/DL (ref 6.4–8.2)
RBC # BLD AUTO: 3.87 M/UL (ref 4.1–5.7)
SODIUM SERPL-SCNC: 140 MMOL/L (ref 136–145)
WBC # BLD AUTO: 4.5 K/UL (ref 4.1–11.1)

## 2019-12-03 PROCEDURE — 74011250637 HC RX REV CODE- 250/637: Performed by: INTERNAL MEDICINE

## 2019-12-03 PROCEDURE — 80053 COMPREHEN METABOLIC PANEL: CPT

## 2019-12-03 PROCEDURE — 74011250636 HC RX REV CODE- 250/636: Performed by: INTERNAL MEDICINE

## 2019-12-03 PROCEDURE — 36415 COLL VENOUS BLD VENIPUNCTURE: CPT

## 2019-12-03 PROCEDURE — 85025 COMPLETE CBC W/AUTO DIFF WBC: CPT

## 2019-12-03 RX ORDER — LORAZEPAM 1 MG/1
1 TABLET ORAL
Qty: 8 TAB | Refills: 0 | Status: SHIPPED | OUTPATIENT
Start: 2019-12-03

## 2019-12-03 RX ADMIN — HEPARIN SODIUM 5000 UNITS: 5000 INJECTION INTRAVENOUS; SUBCUTANEOUS at 05:58

## 2019-12-03 RX ADMIN — DIVALPROEX SODIUM 500 MG: 250 TABLET, EXTENDED RELEASE ORAL at 08:00

## 2019-12-03 RX ADMIN — ALLOPURINOL 100 MG: 100 TABLET ORAL at 08:00

## 2019-12-03 RX ADMIN — LACOSAMIDE 100 MG: 100 TABLET, FILM COATED ORAL at 08:00

## 2019-12-03 RX ADMIN — Medication 10 ML: at 05:58

## 2019-12-03 RX ADMIN — ASPIRIN 325 MG: 325 TABLET, FILM COATED ORAL at 08:00

## 2019-12-03 NOTE — FACE TO FACE
Home Health Care Discharge Planning: [facility] Face to Face Encounter NAME: Laura Valero :  1945 MRN:  [id number] Primary Diagnosis: Principal Problem: 
  Acute encephalopathy (2019) Active Problems: 
  Seizure disorder ([facility] (2019) SÁNCHEZ (acute kidney injury) ([facility] (2019) HTN (hypertension) (2019) Schizophrenia ([facility] (2019) Date of Face to Face:  12/3/2019 10:19 AM        
                        
Face to Face Encounter findings are related to primary reason for home care:   YES 
 
1. I certify that the patient needs intermittent skilled nursing care, physical therapy and/or speech therapy. I will not be following this patient in the Community and Mandy Soriano, MD will be responsible for signing the [address] of Care. 2. Initial Orders for Care: Skilled Nursing and Physical Therapy 3. I certify that this patient is homebound because of illness, need the aid of supportive devices such as walker and the assistance of another person in order to leave their place of residence. There exists a normal inability to leave home and leaving home requires a considerable and taxing effort. 4. I certify that this patient is under my care and that I had a Face-to-Face Encounter that meets the physician Face-to-Face Encounter requirements. Pt admitted to Hospital  From 2019 to 12/3/2019, I saw the patient on the day of discharge. Seen by Physical therapy and occupational therapy in house and recommended that home therapy be set up. Document the physical findings from the Face-to-Face Encounter that support the need for skilled services: Has new medications that requires skilled nursing teaching and monitoring for understanding and compliance  and Has new finding of weakness and altered mobility that requires skilled physical therapy services for evaluation and interventions. Stanton Nuñez MD 
Discharging Physician Office:  [phone] Fax:    [phone]

## 2019-12-03 NOTE — PALLIATIVE CARE DISCHARGE
Goals of Care/Treatment Preferences The Palliative Medicine team was consulted as part of your/your loved one's care in the hospital. Our team is a supportive service; we strive to relieve suffering and improve quality of life. You met with Herber Fairchild NP and she discussed the need for a DDNR order for home, to follow up on your wishes for No CPR at time of death. We were not able to reach and meet with your family who would need to sign this document. It is recommended that you have this document that can be completed by your PCP once you are home. We reviewed advance care planning information, which includes the following: 
  
 
Patient/Health Care Proxy Stated Goals: Rehabilitation We reviewed / discussed your code status as: DNR 
   Full Code means perform CPR in the event of cardiac arrest. 
    DNR means do NOT perform CPR in the event of cardiac arrest. 
    Partial Code means you have specific preferences, please discuss with your healthcare team. 
    Joel Sanders means this issue was not addressed / resolved during your stay Medical Interventions: Limited additional interventions Because of the importance of this information, we are providing you with a printed copy to share with other healthcare providers after this hospitalization is complete.  We hope you have some quality time when you leave the hospital.

## 2019-12-03 NOTE — INTERDISCIPLINARY ROUNDS
Oncology Interdisciplinary rounds were held today to discuss patient plan of care and outcomes. The following members were present: Nursing, Physician, Case Management, Pharmacy, and PT/OT Actual Length of Stay: 23 DRG GLOS: 4.3 Expected Length of Stay: 4d 7h 
 
 
 
               Plan            Discharge Discharge today 12/2 Home with home health

## 2019-12-03 NOTE — PROGRESS NOTES
I reviewed pertinent labs and imaging, and discussed /agreed on the plan of care with Dr. Garry Loza. Hospitalist Progress Note    NAME: Sloane Hardin   :  1945   MRN:  [id number]     History of Present Illness: The patient is 66years old  man with past medical history of seizure disorder, chronic encephalopathy with underlying schizophrenia presented to emergency department due to change in his mentation as it was reported at the nursing facility. There was no family at bedside or caregiver and all history was obtained from EMR and ER doctor. They report that when the patient got here they called code stroke however he did not have any neurologic deficits and he was evaluated by tele-neurologist which he recommended to admit the patient for inpatient EEG     Assessment / Plan:  Acute encephalopathy POA ? back to baseline  · Multifactorial   · Please note I assumed care for pt today  · Hx of seizure disorder; changed to Vimpat  · Work-up unremarkable  · CM working on placement/discharge planning, not clear where things stand    SÁNCHEZ- resolved  · Cr 0.82    Seizure disorder  · Discontinue Keppra and start Vimpat  · Appreciate Neurology input    HTN  · PRN metoprolol IV    Schizophrenia/Delerium  · Appreciate psychiatry input; does not meet inpatient criteria- signed off 19  · Psychiatry re-consulted 2019; signed off 2019  · Continue PRN haldol and ativan for increased agitation     25.0 - 29.9 Overweight / Body mass index is 26.13 kg/m². Code status: DNR  Prophylaxis: None indicated  Recommended Disposition: SNF/LTC   Family continues to request patient be discharged to [facility]. His niece does not feel patient is safe to go home even with 24 hour care or to a [place] funded placement.   CM continues to work on placement and discharge planning      Subjective:     Chief Complaint / Reason for Physician Visit  \"I am going to make it\"  Seen at bedside, denies complaints  Confused, able to tell me his birthday, not current year or where he was  [employer], asking for food    Discussed with RN events overnight. Review of Systems:  Symptom Y/N Comments  Symptom Y/N Comments   Fever/Chills n   Chest Pain n    Poor Appetite    Edema     Cough n   Abdominal Pain n    Sputum    Joint Pain     SOB/WILSON n   Pruritis/Rash     Nausea/vomit n   Tolerating PT/OT     Diarrhea n   Tolerating Diet y    Constipation    Other             Objective:     VITALS:   Last 24hrs VS reviewed since prior progress note. Most recent are:  Patient Vitals for the past 24 hrs:   Temp Pulse Resp BP SpO2   12/03/19 0800 96.3 °F (35.7 °C) 72 18 136/81 93 %   12/03/19 0300 97.3 °F (36.3 °C) 66 18 147/84 92 %   12/02/19 2010 98.1 °F (36.7 °C) (!) 102 20 (!) 152/100 99 %   12/02/19 1547 98.3 °F (36.8 °C) 70  139/84 96 %       Intake/Output Summary (Last 24 hours) at 12/3/2019 0845  Last data filed at 12/2/2019 1425  Gross per 24 hour   Intake    Output 150 ml   Net -150 ml        PHYSICAL EXAM:  General: No acute distress. Elderly  male. EENT:   Anicteric sclerae. MMM. +[place]  Resp:  CTA bilaterally, no wheezing or rales. No accessory muscle use  CV:  RRR.  No edema  GI:  Soft, Non distended, Non tender.  +bowel sounds   Neurologic:  Alert and oriented to birthday only, normal speech,  Moves all limbs equally  Psych:   no insight. Not anxious, not agitated at this time. Skin:  No rashes.   No jaundice    Reviewed most current lab test results and cultures  YES  Reviewed most current radiology test results   YES  Review and summation of old records today    NO  Reviewed patient's current orders and MAR    YES  PMH/SH reviewed - no change compared to H&P  ________________________________________________________________________  Care Plan discussed with:    Comments   Patient x    Family      RN x    Care Manager     Consultant                        Multidiciplinary team rounds were held today with , nursing, pharmacist and clinical coordinator. Patient's plan of care was discussed; medications were reviewed and discharge planning was addressed. ________________________________________________________________________  Total NON critical care TIME:  25   Minutes    Total CRITICAL CARE TIME Spent:   Minutes non procedure based      Comments   >50% of visit spent in counseling and coordination of care     ________________________________________________________________________  Jean Claude Diamond MD     Procedures: see electronic medical records for all procedures/Xrays and details which were not copied into this note but were reviewed prior to creation of Plan. LABS:  I reviewed today's most current labs and imaging studies. Pertinent labs include:  No results for input(s): WBC, HGB, HCT, PLT, HGBEXT, HCTEXT, PLTEXT, HGBEXT, HCTEXT, PLTEXT in the last 72 hours. No results for input(s): NA, K, CL, CO2, GLU, BUN, CREA, CA, MG, PHOS, ALB, TBIL, TBILI, SGOT, ALT, INR, INREXT, INREXT in the last 72 hours.     Signed: Jean Claude Diamond MD

## 2019-12-03 NOTE — PROGRESS NOTES
EDIN:   1) Home with HH (RN, PT and OT)   2) Home via [facility] transportation     12:17 PM- CM faxed updated clinicals to the [place]. Balaji Patiño aware that she needs to follow up on the [facility] services with the [place]. Pt is cleared to d/c from  perspective. 9:19 AM- CM contacted the [place] regarding [facility]. [facility] as well as additional orders and updated clinicals. CM informing attending, once available CM will fax to [phone].  contacted Rossana with [facility] informing her of delay, and requested a 1:00 PM  time. RN and pt's balaji Patiño informed.      [place] MISTI [place], [address]   [phone]

## 2019-12-03 NOTE — PROGRESS NOTES
Oncology End of Shift Note      Bedside shift change report given to Sofiya Carr RN (incoming nurse) by Berlin Cazares (outgoing nurse) on Miah Glass. Report included the following information SBAR, Kardex and MAR. Shift Summary: Patient received all medications. PRN ativan or haldol was not administered. Patient had two bowel movements and was cleaned up throughout the day. Issues for Physician to Address:  None. Patient on Cardiac Monitoring?     [] Yes  [x] No    Rhythm:        Berlin Cazares

## 2019-12-03 NOTE — DISCHARGE SUMMARY
Hospitalist Discharge Summary      Patient ID:  Katherine Runner  [id number]  50 y.o.  1945 11/14/2019     PCP on record: Mandy Dinh MD     Admit date: 11/14/2019    Discharge date and time: 12/3/2019     DISCHARGE DIAGNOSIS:          Active Hospital Problems     Diagnosis Date Noted    Acute encephalopathy 11/26/2019    SÁNCHEZ (acute kidney injury) ([facility] 11/26/2019    HTN (hypertension) 11/26/2019    Schizophrenia ([facility] 11/26/2019    Seizure disorder ([facility] 11/14/2019      Current Discharge Medication List      START taking these medications    Details   LORazepam (ATIVAN) 1 mg tablet Take 1 Tab by mouth every eight (8) hours as needed for Anxiety (or agitation). Max Daily Amount: 3 mg. Qty: 8 Tab, Refills: 0    Associated Diagnoses: Acute encephalopathy      lacosamide (VIMPAT) 100 mg tab tablet Take 1 Tab by mouth every twelve (12) hours. Max Daily Amount: 200 mg. Qty: 30 Tab, Refills: 0    Associated Diagnoses: Seizure disorder ([facility]         CONTINUE these medications which have CHANGED    Details   allopurinol (ZYLOPRIM) 100 mg tablet Take 1 Tab by mouth daily. Qty: 30 Tab, Refills: 0         CONTINUE these medications which have NOT CHANGED    Details   cyanocobalamin (VITAMIN B12) 500 mcg tablet Take 1,000 mcg by mouth daily. cholecalciferol (VITAMIN D3) (1000 Units /25 mcg) tablet Take 2,000 Units by mouth daily. 2 x 1000 units      amLODIPine (NORVASC) 10 mg tablet Take 10 mg by mouth daily. hydrOXYzine HCl (ATARAX) 10 mg tablet Take 10 mg by mouth two (2) times daily as needed for Anxiety. loratadine (CLARITIN) 10 mg tablet Take 10 mg by mouth daily. lisinopril (PRINIVIL, ZESTRIL) 40 mg tablet Take 20 mg by mouth daily. 1/2 of 40mg tab      PALIPERIDONE PALMITATE IM 78 mg by IntraMUSCular route every month. Pt gets this every 4 weeks for schizophrenia       divalproex ER (DEPAKOTE ER) 500 mg ER tablet Take 1,000 mg by mouth daily.       metoprolol tartrate (LOPRESSOR) 50 mg tablet Take 50 mg by mouth two (2) times a day. aspirin (ASPIRIN) 325 mg tablet Take 325 mg by mouth daily.              CONSULTATIONS:  IP CONSULT TO HOSPITALIST  IP CONSULT TO NEUROLOGY  IP CONSULT TO PSYCHIATRY  IP CONSULT TO PALLIATIVE CARE - PROVIDER  IP CONSULT TO PSYCHIATRY     Excerpted HPI from H&P of Rika Key MD:  \"The patient is 66years old  man with past medical history of seizure disorder, chronic encephalopathy with underlying schizophrenia presented to emergency department due to change in his mentation as it was reported at the nursing facility. There was no family at bedside or caregiver and all history was obtained from EMR and ER doctor. They report that when the patient got here they called code stroke however he did not have any neurologic deficits and he was evaluated by tele-neurologist which he recommended to admit the patient for inpatient EEG. \"  ______________________________________________________________________  DISCHARGE SUMMARY/HOSPITAL COURSE:  for full details see H&P, daily progress notes, labs, consult notes.    Tg Lopes y.o. male was admitted to [address] [place] [address] on 11/14/2019 and treated for the following medical complaints:      Acute encephalopathy-back to baseline  · Multifactorial   · Hx of seizure disorder; discontinue Keppra and begin Vimpat  · Work-up unremarkable  · CM working on placement/discharge planning     SÁNCHEZ- resolved  · Cr 0.82     Seizure disorder  · Discontinue Keppra and start Vimpat  · Appreciate Neurology input     HTN  · PRN metoprolol IV     Schizophrenia/Delerium  · Appreciate psychiatry input; does not meet inpatient criteria- signed off 11/19/19  · Psychiatry re-consulted 11/25/2019; signed off 11/26/2019  · Continue PRN haldol and ativan for increased agitation      Patient's plan of care has been reviewed with them.  Patient and/or family have verbally conveyed their understanding and agreement of the patient's signs, symptoms, diagnosis, treatment and prognosis and additionally agree to follow up as recommended or return to [facility] should their condition change prior to follow-up. Aime Mane instructions have also been provided to the patient with some educational information regarding their diagnosis as well a list of reasons why they would want to return to the office prior to their follow-up appointment should their condition change.     Mason Carrillo to avoid frequent ED visits. [facility] can treat; pains, sprains, cuts, wounds, high fevers, upper respiratory infections and much more. There medical team is equipped with all the tools necessary to provide advanced medical care in the comfort of you home, workplace, or location of need. The medical team consists of doctors, nurse practitioners, and EMTs. [facility] is available 7 days per week 9 am to 9 pm.   Request care by calling [phone] or by going online at [phone] M/A-COM unar  ______________________________________________________________________  Patient seen and examined by me on discharge day. Patient Follow Up Instructions: Activity: Activity as tolerated  Diet: Regular Diet  Wound Care: None needed     Follow-up with PCP in 1 week.              Follow-up Information      Follow up With Specialties Details Why Contact Info     Other, MD Mandy       Patient can only remember the practice name and not the physician        [facility], In-Home Clinical Assessments     Mobile Urgent Care That Comes To [address] [facility]  [phone]          ________________________________________________________________     Risk of deterioration: Moderate     Condition at Discharge:  Stable  __________________________________________________________________     Disposition  Home with family and home health services     ____________________________________________________________________     Code Status: DNR/DNI  ___________________________________________________________________        Total time in minutes spent coordinating this discharge (includes going over instructions, follow-up, prescriptions, and preparing report for sign off to her PCP) :  31 minutes     Signed:  Kalpana Giordano MD

## 2019-12-03 NOTE — PROGRESS NOTES
I have reviewed discharge instructions with the patient. Discharge medications reviewed with patient and appropriate educational materials and side effects teaching were provided. Follow-up appointments reviewed. Opportunity for questions and clarification was provided. Venous access removed without difficulty. Patient's belongings gathered and sent with patient. Patient is ready for discharge. Hard scripts were given to patient.      Livan Tarik

## 2019-12-03 NOTE — PROGRESS NOTES
1907 Bedside and Verbal shift change report given to Miguel Ángel Yeboah (oncoming nurse) by Saintclair Patricia (offgoing nurse). Report included the following information SBAR, MAR and Recent Results. 6722 Bedside and Verbal shift change report given to Saintclair Patricia (oncoming nurse) by Miguel Ángel Yeboah (offgoing nurse). Report included the following information SBAR and MAR.

## 2019-12-06 NOTE — CDMP QUERY
Patient admitted with [facility] [place]. Noted documentation of acute encephalopathy in dcs. Please provide clinical indicators/treatment to support this diagnosis or if ruled out. The medical record reflects the following:   
   Risk Factors:seizure disorder, schio Clinical Indicators: 11/16 pn-Acute encephalopathy likely metabolic due to seizures and delirium 
-Per family, patient has some underlying baseline confusion, H&P- patient chronically confused Treatment: ct head neg, ammonia wnl, repeat ua, mri brain, ativan for seizures Amarilis Herring RN. [facility]

## 2019-12-10 ENCOUNTER — HOSPITAL ENCOUNTER (EMERGENCY)
Age: 74
Discharge: HOME | End: 2019-12-11
Attending: EMERGENCY MEDICINE | Admitting: EMERGENCY MEDICINE
Payer: MEDICARE

## 2019-12-10 DIAGNOSIS — R45.1 AGITATION: ICD-10-CM

## 2019-12-10 DIAGNOSIS — R62.7 FAILURE TO THRIVE IN ADULT: ICD-10-CM

## 2019-12-10 DIAGNOSIS — Z66 DNR (DO NOT RESUSCITATE): ICD-10-CM

## 2019-12-10 DIAGNOSIS — G30.9 ALZHEIMER'S DEMENTIA WITH BEHAVIORAL DISTURBANCE, UNSPECIFIED TIMING OF DEMENTIA ONSET: Primary | ICD-10-CM

## 2019-12-10 DIAGNOSIS — F02.81 ALZHEIMER'S DEMENTIA WITH BEHAVIORAL DISTURBANCE, UNSPECIFIED TIMING OF DEMENTIA ONSET: Primary | ICD-10-CM

## 2019-12-10 DIAGNOSIS — F20.9 SCHIZOPHRENIA, UNSPECIFIED TYPE (HCC): ICD-10-CM

## 2019-12-10 LAB
ALBUMIN SERPL-MCNC: 3 G/DL (ref 3.5–5)
ALBUMIN/GLOB SERPL: 0.9 {RATIO} (ref 1.1–2.2)
ALP SERPL-CCNC: 68 U/L (ref 45–117)
ALT SERPL-CCNC: 70 U/L (ref 12–78)
ANION GAP SERPL CALC-SCNC: 8 MMOL/L (ref 5–15)
APPEARANCE UR: CLEAR
AST SERPL-CCNC: 39 U/L (ref 15–37)
BACTERIA URNS QL MICRO: NEGATIVE /HPF
BASOPHILS # BLD: 0.1 K/UL (ref 0–0.1)
BASOPHILS NFR BLD: 1 % (ref 0–1)
BILIRUB SERPL-MCNC: 0.5 MG/DL (ref 0.2–1)
BILIRUB UR QL CFM: NEGATIVE
BUN SERPL-MCNC: 40 MG/DL (ref 6–20)
BUN/CREAT SERPL: 45 (ref 12–20)
CALCIUM SERPL-MCNC: 9.4 MG/DL (ref 8.5–10.1)
CHLORIDE SERPL-SCNC: 108 MMOL/L (ref 97–108)
CO2 SERPL-SCNC: 26 MMOL/L (ref 21–32)
COLOR UR: ABNORMAL
CREAT SERPL-MCNC: 0.88 MG/DL (ref 0.7–1.3)
DIFFERENTIAL METHOD BLD: ABNORMAL
EOSINOPHIL # BLD: 0.2 K/UL (ref 0–0.4)
EOSINOPHIL NFR BLD: 3 % (ref 0–7)
EPITH CASTS URNS QL MICRO: ABNORMAL /LPF
ERYTHROCYTE [DISTWIDTH] IN BLOOD BY AUTOMATED COUNT: 14.8 % (ref 11.5–14.5)
GLOBULIN SER CALC-MCNC: 3.4 G/DL (ref 2–4)
GLUCOSE SERPL-MCNC: 119 MG/DL (ref 65–100)
GLUCOSE UR STRIP.AUTO-MCNC: NEGATIVE MG/DL
HCT VFR BLD AUTO: 40.6 % (ref 36.6–50.3)
HGB BLD-MCNC: 13.2 G/DL (ref 12.1–17)
HGB UR QL STRIP: ABNORMAL
IMM GRANULOCYTES # BLD AUTO: 0 K/UL (ref 0–0.04)
IMM GRANULOCYTES NFR BLD AUTO: 1 % (ref 0–0.5)
KETONES UR QL STRIP.AUTO: ABNORMAL MG/DL
LEUKOCYTE ESTERASE UR QL STRIP.AUTO: NEGATIVE
LYMPHOCYTES # BLD: 0.8 K/UL (ref 0.8–3.5)
LYMPHOCYTES NFR BLD: 13 % (ref 12–49)
MCH RBC QN AUTO: 31.1 PG (ref 26–34)
MCHC RBC AUTO-ENTMCNC: 32.5 G/DL (ref 30–36.5)
MCV RBC AUTO: 95.8 FL (ref 80–99)
MONOCYTES # BLD: 0.5 K/UL (ref 0–1)
MONOCYTES NFR BLD: 8 % (ref 5–13)
MUCOUS THREADS URNS QL MICRO: ABNORMAL /LPF
NEUTS SEG # BLD: 5 K/UL (ref 1.8–8)
NEUTS SEG NFR BLD: 74 % (ref 32–75)
NITRITE UR QL STRIP.AUTO: NEGATIVE
NRBC # BLD: 0 K/UL (ref 0–0.01)
NRBC BLD-RTO: 0 PER 100 WBC
PH UR STRIP: 5 [PH] (ref 5–8)
PLATELET # BLD AUTO: 236 K/UL (ref 150–400)
PMV BLD AUTO: 10.3 FL (ref 8.9–12.9)
POTASSIUM SERPL-SCNC: 3.9 MMOL/L (ref 3.5–5.1)
PROT SERPL-MCNC: 6.4 G/DL (ref 6.4–8.2)
PROT UR STRIP-MCNC: NEGATIVE MG/DL
RBC # BLD AUTO: 4.24 M/UL (ref 4.1–5.7)
RBC #/AREA URNS HPF: ABNORMAL /HPF (ref 0–5)
SODIUM SERPL-SCNC: 142 MMOL/L (ref 136–145)
SP GR UR REFRACTOMETRY: 1.02 (ref 1–1.03)
UA: UC IF INDICATED,UAUC: ABNORMAL
UROBILINOGEN UR QL STRIP.AUTO: 1 EU/DL (ref 0.2–1)
WBC # BLD AUTO: 6.6 K/UL (ref 4.1–11.1)
WBC URNS QL MICRO: ABNORMAL /HPF (ref 0–4)

## 2019-12-10 PROCEDURE — 80053 COMPREHEN METABOLIC PANEL: CPT

## 2019-12-10 PROCEDURE — 74011250636 HC RX REV CODE- 250/636: Performed by: EMERGENCY MEDICINE

## 2019-12-10 PROCEDURE — 85025 COMPLETE CBC W/AUTO DIFF WBC: CPT

## 2019-12-10 PROCEDURE — 77030011943

## 2019-12-10 PROCEDURE — 96372 THER/PROPH/DIAG INJ SC/IM: CPT

## 2019-12-10 PROCEDURE — 81001 URINALYSIS AUTO W/SCOPE: CPT

## 2019-12-10 PROCEDURE — 99285 EMERGENCY DEPT VISIT HI MDM: CPT

## 2019-12-10 PROCEDURE — 36415 COLL VENOUS BLD VENIPUNCTURE: CPT

## 2019-12-10 RX ORDER — HALOPERIDOL 5 MG/ML
5 INJECTION INTRAMUSCULAR
Status: COMPLETED | OUTPATIENT
Start: 2019-12-10 | End: 2019-12-10

## 2019-12-10 RX ORDER — HALOPERIDOL 5 MG/ML
5 INJECTION INTRAMUSCULAR
Status: DISCONTINUED | OUTPATIENT
Start: 2019-12-10 | End: 2019-12-10

## 2019-12-10 RX ADMIN — HALOPERIDOL LACTATE 5 MG: 5 INJECTION INTRAMUSCULAR at 23:58

## 2019-12-10 NOTE — ED TRIAGE NOTES
Pt presents to ED via EMS from home after caregiver reported generalized weakness and failure to thrive since d/c from this facility recently. Caregiver states pt has been sliding himself out of bed but too weak to get up. Hx of dementia, schizophrenia. Denies any injury from sliding out of bed. Endorses diarrhea x 6 days. Reports good po intake.  Pt a&ox2

## 2019-12-11 VITALS
WEIGHT: 168.43 LBS | SYSTOLIC BLOOD PRESSURE: 132 MMHG | DIASTOLIC BLOOD PRESSURE: 90 MMHG | BODY MASS INDEX: 24.11 KG/M2 | RESPIRATION RATE: 16 BRPM | HEART RATE: 65 BPM | OXYGEN SATURATION: 100 % | HEIGHT: 70 IN | TEMPERATURE: 98 F

## 2019-12-11 NOTE — DISCHARGE INSTRUCTIONS
Patient Education        Alzheimer's Disease: Care Instructions  Your Care Instructions    Alzheimer's disease is a type of dementia. It causes memory loss and affects judgment, language, and behavior. You may have trouble making decisions or may get lost in places that you used to know well. Alzheimer's disease is different than mild memory loss that occurs with aging. It is not clear what causes Alzheimer's disease, but it is the most common form of dementia in older adults. Finding out that you have this disease is a shock. You may be afraid and worried about how the condition will change your life. Although there is no cure at this time, medicine in some cases may slow memory loss for a while. Other medicines may be able to help you sleep or cope with depression and behavior changes. Alzheimer's disease is different for everyone. It may take many years to develop. In some cases, people can function well for a long time. In the early stage of the disease, you can do things at home to make life easier and safer. You also can keep doing your hobbies and other activities. Many people find comfort in planning now for their future needs. Follow-up care is a key part of your treatment and safety. Be sure to make and go to all appointments, and call your doctor if you are having problems. It's also a good idea to know your test results and keep a list of the medicines you take. How can you care for yourself at home? Taking care of yourself  · If your doctor gives you medicines, take them exactly as prescribed. Call your doctor if you think you are having a problem with your medicine. You will get more details on the medicines your doctor prescribes. · Eat a balanced diet. Get plenty of whole grains, fruits, and vegetables every day. If you are not hungry at mealtimes, eat snacks at midmorning and in the afternoon.  Try drinks such as Boost, Ensure, or Sustacal if you are having trouble keeping your weight up.  · Stay active. Exercise such as walking may slow the decline of your mental abilities. Try to stay active mentally too. Read and work crossword puzzles if you enjoy these activities. · If you have trouble sleeping, do not nap during the day. Get regular exercise (but not within several hours of bedtime). Drink a glass of warm milk or caffeine-free herbal tea before going to bed. · Ask your doctor about support groups and other resources in your area. They can help people who have Alzheimer's disease and their families. · Be patient. You may find that a task takes you longer than it used to. · If you have not already done so, make a list of advance directives. Advance directives are instructions to your doctor and family members about what kind of care you want if you become unable to speak or express yourself. Talk to a  about making a will, if you do not already have one. Keeping schedules  · Develop a routine. You will feel less frustrated or confused if you have a clear, simple plan of what to do every day. ? Make lists of your medicines and when to take them. ? Write down appointments and other tasks in a calendar. ? Put sticky notes around the house to help you remember events and other things you have to do. ? Schedule activities and tasks for times of the day when you are best able to handle them. Staying safe  · Tell someone when you are going out and where you are going. Let the person know when you will be back. Before you go out alone, write down where you are going, how to get there, and how to get back home. Do this even if you have gone there many times before. Take someone along with you when possible. · Make your home safe. Tack down rugs, put no-slip tape in the tub, use handrails, and put safety switches on stoves and appliances. · Have a family member or other caregiver tell you whether you are driving badly. Deciding to stop driving is very hard for many people.  Driving helps you feel independent. Your state 's license bureau can do a driving test if there is any question. Plan for other means of getting around when you are no longer able to drive. · Use strong lighting, especially at night. Put night-lights in bedrooms, hallways, and bathrooms. · Lower the hot water temperature setting to 120°F or lower to avoid burns. When should you call for help? Call 911 anytime you think you may need emergency care. For example, call if:    · You are lost and do not know whom to call.     · You are injured and do not know whom to call.    Call your doctor now or seek immediate medical care if:    · Your symptoms suddenly get much worse.    Watch closely for changes in your health, and be sure to contact your doctor if:    · You want more information about how you can take care of yourself. Where can you learn more? Go to http://venessa-nathan.info/. Enter Y179 in the search box to learn more about \"Alzheimer's Disease: Care Instructions. \"  Current as of: May 28, 2019  Content Version: 12.2  © 3789-7802 [employer]. Care instructions adapted under license by [employer] (which disclaims liability or warranty for this information). If you have questions about a medical condition or this instruction, always ask your healthcare professional. [place] [address] any warranty or liability for your use of this information. Patient Education        Schizophrenia: Care Instructions  Your Care Instructions  Schizophrenia is a disease that makes it hard to think clearly, manage emotions, and interact with other people. It can cause:  · Delusions. These are beliefs that are not real.  · Hallucinations. These are things that you may see or hear that are not really there. · Paranoia. This is the belief that others are lying, cheating, using you, or trying to harm you.   The disease may change your ability to enjoy life, express emotions, or function. At times, you may hear voices, behave strangely, have trouble speaking or understanding speech, or keep to yourself. The goal of treatment is to lower your stress and help your brain function normally. You may need lifelong treatment with medicines and counseling to keep your schizophrenia under control. When schizophrenia is not treated, the risks are higher for suicide, a hospital stay, and other problems. Early treatment called coordinated specialty care [facility]) may help a person who is having his or her first episode of psychotic thoughts. Ask your doctor about [address]. Follow-up care is a key part of your treatment and safety. Be sure to make and go to all appointments, and call your doctor if you are having problems. It's also a good idea to know your test results and keep a list of the medicines you take. How can you care for yourself at home? · Be safe with medicines. Take your medicines exactly as prescribed. Call your doctor if you think you are having a problem with your medicine. When you feel good, you may think that you do not need your medicines. But it is important to keep taking them as scheduled. · Go to your counseling sessions. Call and talk with your counselor if you can't attend or if you don't think the sessions are helping. Do not just stop going. · Eat a healthy diet. Talk with a dietitian about what type of diet may be best for you. · Do not use alcohol or illegal drugs. · Keep the numbers for these national suicide hotlines: 8-153-753-TALK (5-907.865.2372) and 6-413-UBAQWBB (3-302.728.5471). If you or someone you know talks about suicide or feeling hopeless, get help right away. What should you do if someone in your family has schizophrenia? · Learn about the disease and how it may get worse over time. · Remind your family member that you love him or her.   · Make a plan with all family members about how to take care of your loved one when his or her symptoms are bad. · Talk about your fears and concerns and those of other family members. Seek counseling if needed. · Do not focus attention only on the person who is in treatment. · Remind yourself that it will take time for changes to occur. · Do not blame yourself for the disease. · Know your legal rights and the legal rights of your family member. · Take care of yourself. Stay involved with your own interests, such as your career, hobbies, and friends. Use exercise, positive self-talk, relaxation, and deep breathing to help lower your stress. · Give yourself time to grieve. You may need to deal with emotions such as anger, fear, and frustration. After you work through your feelings, you will be better able to care for yourself and your family. · If you are having a hard time with your feelings and your interactions with your family member, talk with a counselor. When should you call for help? Call 911 anytime you think you may need emergency care. For example, call if:    · You are thinking about suicide or are threatening suicide.     · You feel like hurting yourself or someone else.    Call your doctor now or seek immediate medical care if:    · You hear voices.     · You think someone is trying to harm you.     · You cannot concentrate or are easily confused.    Watch closely for changes in your health, and be sure to contact your doctor if:    · You are having trouble taking care of yourself.     · You cannot attend your counseling sessions. Where can you learn more? Go to http://venessa-nathan.info/. Enter U473 in the search box to learn more about \"Schizophrenia: Care Instructions. \"  Current as of: May 28, 2019  Content Version: 12.2  © 4460-2710 [employer]. Care instructions adapted under license by [employer] (which disclaims liability or warranty for this information).  If you have questions about a medical condition or this instruction, always ask your healthcare professional. [address] any warranty or liability for your use of this information.

## 2019-12-11 NOTE — ED NOTES
Spoke with case management. Pt. Family to come  patient from ED. Patient dressed and ready for discharge at this time. Pt. Also provided with meal tray.

## 2019-12-11 NOTE — ED NOTES
Bedside and Verbal shift change report given to Karen Wiggins RN (oncoming nurse) by Mariana Knox RN (offgoing nurse). Report included the following information SBAR, Kardex, ED Summary, Procedure Summary, Intake/Output, MAR and Recent Results.

## 2019-12-11 NOTE — ED NOTES
Incontinence care performed at this time    Pt incontinent of stool, linens, gown, diaper, and pads changed at this time

## 2019-12-11 NOTE — ED NOTES
This nurse spoke with Marion carpio of patient at this time    Per [facility] the caregiver will not take the patient back and [facility]    Per this conversation, [facility] stated that the patient needs to be seen at the [place] by \"mental health\"    Anders Hebert MD aware at this time

## 2019-12-11 NOTE — ED NOTES
Pt pulled IV line, cardiac monitoring, BP, and O2 monitoring at this time    Sukumar Hanna MD aware    This nurse will spot check vitals as needed

## 2019-12-11 NOTE — ED PROVIDER NOTES
EMERGENCY DEPARTMENT HISTORY AND PHYSICAL EXAM     ------------------------------------------------------------------------------------------------------  Please note that this dictation was completed with [employer], the [employer] voice recognition software. Quite often unanticipated grammatical, syntax, homophones, and other interpretive errors are inadvertently transcribed by the computer software. Please disregard these errors. Please excuse any errors that have escaped final proofreading.  -----------------------------------------------------------------------------------------------------------------    Date: 12/10/2019  Patient Name: Jessica Pimentel    History of Presenting Illness     Chief Complaint   Patient presents with    Fatigue       History Provided By: EMS, family    HPI: Jessica Pimentel is a 66 y.o. male, with significant pmhx of CAD, schizophrenia, chronic alcoholism in the past, who presents via EMS to the ED with c/o recurrently sliding out of bed at his group home. Per review of chart patient with recent hospitalizations for altered mental status. Previously seen in the emergency department after having seizure-like activity at his group home. Patient with history of schizophrenia and receives most of his care at the [place]. Patient repeatedly brought to our emergency department despite the family's wishes that he did brought to the [place]. Patient family notes that they want him to go to the \"and place\" so that he can receive his \"shot\" for his schizophrenia. Review of patient's discharge summary from the second of this month noted to have palliative consult and case management noting that placement would require face-to-face evaluation at the [place] with update of his clinicals. Unsure if patient has been to see the [place] since that time. Patient's family members not available at time of evaluation.   Palliative care notes also indicate plan for DNR without signing of first.    PCP: Other, MD Mandy    No Known Allergies      Current Outpatient Medications   Medication Sig Dispense Refill    LORazepam (ATIVAN) 1 mg tablet Take 1 Tab by mouth every eight (8) hours as needed for Anxiety (or agitation). Max Daily Amount: 3 mg. 8 Tab 0    allopurinol (ZYLOPRIM) 100 mg tablet Take 1 Tab by mouth daily. 30 Tab 0    lacosamide (VIMPAT) 100 mg tab tablet Take 1 Tab by mouth every twelve (12) hours. Max Daily Amount: 200 mg. 30 Tab 0    cyanocobalamin (VITAMIN B12) 500 mcg tablet Take 1,000 mcg by mouth daily.  cholecalciferol (VITAMIN D3) (1000 Units /25 mcg) tablet Take 2,000 Units by mouth daily. 2 x 1000 units      amLODIPine (NORVASC) 10 mg tablet Take 10 mg by mouth daily.  hydrOXYzine HCl (ATARAX) 10 mg tablet Take 10 mg by mouth two (2) times daily as needed for Anxiety.  loratadine (CLARITIN) 10 mg tablet Take 10 mg by mouth daily.  lisinopril (PRINIVIL, ZESTRIL) 40 mg tablet Take 20 mg by mouth daily. 1/2 of 40mg tab      PALIPERIDONE PALMITATE IM 78 mg by IntraMUSCular route every month. Pt gets this every 4 weeks for schizophrenia       divalproex ER (DEPAKOTE ER) 500 mg ER tablet Take 1,000 mg by mouth daily.  metoprolol tartrate (LOPRESSOR) 50 mg tablet Take 50 mg by mouth two (2) times a day.  aspirin (ASPIRIN) 325 mg tablet Take 325 mg by mouth daily. Past History     Past Medical History:  Past Medical History:   Diagnosis Date    CAD (coronary artery disease)     Gout     Hypertension     [facility]     Seizures ([facility] [address] [facility]        Past Surgical History:  No past surgical history on file. Family History:  No family history on file.     Social History:  Social History     Tobacco Use    Smoking status: Former Smoker    Smokeless tobacco: Never Used   Substance Use Topics    Alcohol use: Not Currently     Frequency: Never    Drug use: Never       Allergies:  No Known Allergies      Review of Systems   Review of Systems   Unable to perform ROS: Dementia Physical Exam   Physical Exam  Vitals signs and nursing note reviewed. Constitutional:       General: He is not in acute distress. Appearance: He is well-developed. He is not diaphoretic. HENT:      Head: Normocephalic and atraumatic. Nose: Nose normal.      Mouth/Throat:      Pharynx: No oropharyngeal exudate. Eyes:      Conjunctiva/sclera: Conjunctivae normal.      Pupils: Pupils are equal, round, and reactive to light. Neck:      Musculoskeletal: Normal range of motion and neck supple. Vascular: No JVD. Cardiovascular:      Rate and Rhythm: Normal rate and regular rhythm. Heart sounds: Normal heart sounds. No murmur. No friction rub. Pulmonary:      Effort: Pulmonary effort is normal. No respiratory distress. Breath sounds: Normal breath sounds. No stridor. No wheezing or rales. Abdominal:      General: Bowel sounds are normal. There is no distension. Palpations: Abdomen is soft. Tenderness: There is no tenderness. There is no rebound. Musculoskeletal: Normal range of motion. General: No tenderness. Comments: No focal weakness is appreciated, able to move all extremities against gravity. Notes that he problems walking. Skin:     General: Skin is warm and dry. Findings: No rash. Neurological:      Mental Status: He is alert. He is disoriented. Cranial Nerves: No cranial nerve deficit. Gait: Gait abnormal.   Psychiatric:         Attention and Perception: He is inattentive. Speech: Speech is tangential.         Cognition and Memory: Cognition is impaired. Memory is impaired.            Diagnostic Study Results     Labs -     Recent Results (from the past 12 hour(s))   CBC WITH AUTOMATED DIFF    Collection Time: 12/10/19  8:05 PM   Result Value Ref Range    WBC 6.6 4.1 - 11.1 K/uL    RBC 4.24 4.10 - 5.70 M/uL    HGB 13.2 12.1 - 17.0 g/dL    HCT 40.6 36.6 - 50.3 %    MCV 95.8 80.0 - 99.0 FL    MCH 31.1 26.0 - 34.0 PG MCHC 32.5 30.0 - 36.5 g/dL    RDW 14.8 (H) 11.5 - 14.5 %    PLATELET 183 814 - 581 K/uL    MPV 10.3 8.9 - 12.9 FL    NRBC 0.0 0  WBC    ABSOLUTE NRBC 0.00 0.00 - 0.01 K/uL    NEUTROPHILS 74 32 - 75 %    LYMPHOCYTES 13 12 - 49 %    MONOCYTES 8 5 - 13 %    EOSINOPHILS 3 0 - 7 %    BASOPHILS 1 0 - 1 %    IMMATURE GRANULOCYTES 1 (H) 0.0 - 0.5 %    ABS. NEUTROPHILS 5.0 1.8 - 8.0 K/UL    ABS. LYMPHOCYTES 0.8 0.8 - 3.5 K/UL    ABS. MONOCYTES 0.5 0.0 - 1.0 K/UL    ABS. EOSINOPHILS 0.2 0.0 - 0.4 K/UL    ABS. BASOPHILS 0.1 0.0 - 0.1 K/UL    ABS. IMM. GRANS. 0.0 0.00 - 0.04 K/UL    DF AUTOMATED     METABOLIC PANEL, COMPREHENSIVE    Collection Time: 12/10/19  8:05 PM   Result Value Ref Range    Sodium 142 136 - 145 mmol/L    Potassium 3.9 3.5 - 5.1 mmol/L    Chloride 108 97 - 108 mmol/L    CO2 26 21 - 32 mmol/L    Anion gap 8 5 - 15 mmol/L    Glucose 119 (H) 65 - 100 mg/dL    BUN 40 (H) 6 - 20 MG/DL    Creatinine 0.88 0.70 - 1.30 MG/DL    BUN/Creatinine ratio 45 (H) 12 - 20      GFR est AA >60 >60 ml/min/1.73m2    GFR est non-AA >60 >60 ml/min/1.73m2    Calcium 9.4 8.5 - 10.1 MG/DL    Bilirubin, total 0.5 0.2 - 1.0 MG/DL    ALT (SGPT) 70 12 - 78 U/L    AST (SGOT) 39 (H) 15 - 37 U/L    Alk.  phosphatase 68 45 - 117 U/L    Protein, total 6.4 6.4 - 8.2 g/dL    Albumin 3.0 (L) 3.5 - 5.0 g/dL    Globulin 3.4 2.0 - 4.0 g/dL    A-G Ratio 0.9 (L) 1.1 - 2.2     URINALYSIS W/ REFLEX CULTURE    Collection Time: 12/10/19 10:50 PM   Result Value Ref Range    Color DARK YELLOW      Appearance CLEAR CLEAR      Specific gravity 1.023 1.003 - 1.030      pH (UA) 5.0 5.0 - 8.0      Protein NEGATIVE  NEG mg/dL    Glucose NEGATIVE  NEG mg/dL    Ketone TRACE (A) NEG mg/dL    Blood LARGE (A) NEG      Urobilinogen 1.0 0.2 - 1.0 EU/dL    Nitrites NEGATIVE  NEG      Leukocyte Esterase NEGATIVE  NEG      WBC 0-4 0 - 4 /hpf    RBC 20-50 0 - 5 /hpf    Epithelial cells MODERATE (A) FEW /lpf    Bacteria NEGATIVE  NEG /hpf    UA:UC IF INDICATED CULTURE NOT INDICATED BY UA RESULT CNI      Mucus TRACE (A) NEG /lpf   BILIRUBIN, CONFIRM    Collection Time: 12/10/19 10:50 PM   Result Value Ref Range    Bilirubin UA, confirm NEGATIVE  NEG         Radiologic Studies -   No orders to display     CT Results  (Last 48 hours)    None        CXR Results  (Last 48 hours)    None            Medical Decision Making   I am the first provider for this patient. I reviewed the vital signs, available nursing notes, past medical history, past surgical history, family history and social history. Vital Signs-Reviewed the patient's vital signs. Patient Vitals for the past 12 hrs:   Temp Pulse Resp BP SpO2   12/10/19 2031  97 18  95 %   12/10/19 2030  98 18 141/77    12/10/19 1903 98.1 °F (36.7 °C) 98 18 120/73 97 %       Pulse Oximetry Analysis - 95% on RA    Records Reviewed: Nursing Notes, Old Medical Records and Ambulance Run Sheet    Provider Notes (Medical Decision Making):     DDX:  Failure to thrive, dementia, recurrent falls, diarrhea, electrolyte abnormality, UTI    Plan:  Labs, UA    Impression:  Dementia schizophrenia, failure to thrive    ED Course:   Initial assessment performed. The patients presenting problems have been discussed, and they are in agreement with the care plan formulated and outlined with them. I have encouraged them to ask questions as they arise throughout their visit. I reviewed our electronic medical record system for any past medical records that were available that may contribute to the patients current condition, the nursing notes and and vital signs from today's visit  Nursing notes will be reviewed as they become available in realtime while the pt has been in the ED. Mauricio Perez MD    I personally reviewed pt's imaging. Official read by radiology noted above. Mauricio Perez MD    PROGRESS NOTE  1:02 AM  Patient without significant findings on lab testing while the emergency department.   Upon nursing contact with the family [facility]) about patient's plan for return to his facility she notes that the facility will not take the patient back. Patient family member notes that \"patient is to be a direct ER to ER transfer and that we are to demand that he be transferred there no matter what they are diversion status is. \"  Patient's family member notes that their case management worker (Jazmin) has worked this all out in the past.  Nursing to try and contact Jazmin for further recommendations. PROGRESS NOTE  1:13 AM  I have made contact with the [place] and attempt to speak with the physician in the emergency department. Information was taken by the  who will relay that to the physician he will reportedly call me back. Alee Cedeno MD    PROGRESS NOTE  1:46 AM  Spoke with the ED physician at the [place] department, Dr. Sandra Caldwell. Reviewed patient's lab work-up from today and ongoing history of symptoms. Notes that without acute findings on work-up today patient would likely be discharged from their facility upon arrival.  Notes that he will contact his  and psychiatry to determine if either of them have further suggestions for continued care. Will contact me once he is spoke with them. Alee Cedeno MD    PROGRESS NOTE  2:12 AM  Dr. Sandra Caldwell from the [place] called back and noted by me that his or  are not on-site at this hour. He agrees the patient requires placement at a higher level of care in a group home based on discussion from earlier. Notes that if the patient were to be transferred to his emergency department he would likely just discharged him as there is no acute findings on her work-up this evening that would indicate admission. He also notes speaking with psychiatry at his facility he does not feel that patient needs acute care needs for his psychiatric issues. Notes that his symptoms are likely chronically related to his schizophrenia.       We will continue to monitor patient while the emergency department and await case management assistance in the morning. 0630  Patient's presentation, labs/imaging and plan of care was reviewed with Dr. Neelam Ramos as part of sign out. They will f/u on case management planning as part of the plan discussed with the patient. Dr. Gianna Anguiano assistance in completion of this plan is greatly appreciated but it should be noted that I will be the provider of record for this patient. Luca Olguin MD    10:30AM  I was informed by the  that we will not be able to find a bed for the patient as he will need meagan-psychiatry curry. She discussed with the  at the [place] who recommends transfer to their facility. I explained that the patient was rejected earlier today but they request us to try again. Jarvis Boucher MD     11:30 AM  [facility] are not going to [facility] this patient as an ER to ER transfer. Case management aware  Jarvis Boucher MD     4:15 PM  Family has called and explained that since we are unable to transfer the patient that they are coming to pick him up and they would like him discharged. Critical Care Time:     none      Diagnosis     Clinical Impression:   1. Alzheimer's dementia with behavioral disturbance, unspecified timing of dementia onset ([address]    2. Schizophrenia, unspecified type ([address]    3. Failure to thrive in adult    4. DNR (do not resuscitate)    5. Agitation        PLAN:  1. Discharge home for outpatient placement  2. Return to the ER for further problems        This note will not be viewable in [address]

## 2019-12-11 NOTE — ED NOTES
Bedside and Verbal shift change report given to Jeanice Epley, RN (oncoming nurse) by Kayli Jean RN (offgoing nurse). Report included the following information SBAR, Kardex and ED Summary     Sitter at bedside at this time, pt has hx of dementia and continues to try to get up without assistance    .

## 2019-12-11 NOTE — ED NOTES
Pt provided with box lunch at this time    Per Sitter, pt is becoming more agitated and aggressive    Kevin Patterson MD aware

## 2019-12-11 NOTE — ED NOTES
Spoke with ED physician from the [place]. Unable to accept patient at this time for transfer. Case Management and Dr. Neelam Ramos aware.

## 2019-12-11 NOTE — ED NOTES
Patient assist with myself and Lendel Stammer to wheelchair to brother's truck.  Patient's niece present at truck given d/c instructions

## 2019-12-11 NOTE — ED NOTES
Pt straight cathed at this time for urine specimen    This nurse was assisted by oren, [facility] DANNA Nichols, and Cece Sherwood, EMT.  Pt tolerated well    Specimen collected and sent to lab

## 2019-12-11 NOTE — ED NOTES
Report received from Alysia Mina, [facility]. Girish HASKINS, ED Summary, MAR and Recent Results was discussed.     Le Mcgrath RN

## 2019-12-11 NOTE — ED NOTES
[facility], [facility]  called at this time to discuss plan for patient    No answer, this nurse left a voicemail to please call

## 2019-12-11 NOTE — ED NOTES
Pt moved to room in front of nurse station, oren D/FRANCES at this time    Pt resting quietly still at this time

## 2019-12-11 NOTE — PROGRESS NOTES
Arnold patient brought to ER yesterday evening for altered mental, weakness, FTT. Patient living at [facility] care in [facility] [phone], caregiver Sixto. Patient not being admitted to [facility], doesn't qualify for ER to ER transfer to the [place] due to no medical needs. Apparently caregiver at foster home will not take patient back. (facility is a [place] contracted foster home)    Patient past admit to [facility] 11/14-12/3 for acute encephalopathy, psychiatric eval done at that time and notes sent to the [place], patient discharged to [facility] with home health services. Call placed by CM to caregiver Sixto. Sixto stated to CM the following:    * she is the only staff person, Mr. Odalys Reyez is the only resident. She cannot manage him because he can no longer walk (has no WC), is incontinent, has become combative and unpredictable  * patient was not provided with \"promised\" equipment specifically WC and hospital bed, patient also does not have all his medications  * she understood EMS would take patient to the [place] for consults to obtain needed care and patient could return to her facility once he was improved. Call placed by CM to [facility]. Spoke to Klatcher in Patient Flow office, [phone], extension [phone]. Also received a call from Mayela Bhandari in that office later. [facility] are made aware [facility] situation, Mr. Doug Swartz states they will work on patient placement but if patient needs another facility with LTC abilities AND psychiatric care, this will take some time. [place] staff will place calls to needed facilities and call CM back. Per CM , referrals sent to [address] via [email or web address]. Acceptance pending. Patient may need OBS admission until placement can be arranged. No OBS beds available on first floor unit at this time. Nursing updated. CM will continue to follow. 1050:  Call placed by CM to patient's niece/contact Evans Tony [phone].   Ms. Chao Escalona is very upset her uncle is at [facility] and not at the [address] [place] [address] and requests that patient be transferred ER to ER to the [address] [place] [address] for care. CM informed Ms. Villarreal that there is no medical reason for transfer and the [address] [place] [address] ER has refused patient. Ms. Rupali Davies states that patient must go there for his Palperidone injection for his schizophrenia. Ms. Rupali Davies states that patient gets this injection only at the [address] [place] [address] every 4 weeks and he is 4 days overdue. Ms. Rupali Davies states lack of injection is contributing to patient's mental state and behavior and causes him to have tremors. Ms. Rupali Davies requests that patient be transferred to the [place] checked with pharmacy, we do not carry this medication here. Call placed to [address] [place], [place] [address]samia Diaz office at the [facility]) who states patient should be transferred. Physician and nursing updated. 1150:  Referral completed to [address] Facility cannot accept patient to their dementia unit, no beds available, there is a 2-3 month long waiting list for admission. 1310:  Patient not accepted by the [address] [place] [address] for ER to ER transfer. Updated patient's niece who is very upset and states she will call the [address] [place] [address] again. Chart review indicates patient may have veterans's waiver for LTC in [place], [place] or [facility]. Referrals to all 3 sent via [employer] by CM. Acceptance pending. Nursing updated. 1445:  Checked with pharmacy to see if [facility] can get injection med and potentially return patient to group home. Medication not available in system for [facility] at this time. Calls placed to staff at the [address] [place] [address] to check status. Spoke to [facility] [phone], extension [phone] with Patient Flow office. Ms. Lucio Jacob states that patient's group home caregiver will take patient home if he has his equipment (hospital bed, wheelchair and ?). Ms. Lucio Jacob not certain if patient's Medicare will cover equipment (patient has medicare A - it will not cover equipment).     CM reminded Ms. Lucio Jacob that patient still needs Palperidone injection to improve behavior to return to group home. Ms. Marquita Olivas states patient had appointment to receive shot today at the [facility]. [facility] Marqu[facility] offer any travelling physician/staff who could give the injection at home. Ms. Marquita Olivas made aware that shot is NOT available from [facility]. Ms. Marquita Olivas will call back. 1455:  Call received from a Miss Dyana Narayanan [phone] extension [address] [place] [address], cell [phone] from the [place] who states she is the supervisor for Buzz Ovalle and that office. Miss Dyana Narayanan took summary from [facility] on patient situation. Miss Dyana Narayanan states patient should be moved to the [place] through a ED to ED transfer because of the need for schizophrenia medication. Miss Dyana Narayanan will be calling back with information regarding patient transfer. Nursing and physician updated. 1620:  Call placed to Miss Dyana Narayanan for update by CM. Miss Dyana Narayanan states that message was left at patient's [place] clinic for physician who is not in. Miss Dyana Narayanan will be following up tomorrow. Call placed to patient's niece Dianna oRsales to update. Ms. Brianda Smith states to please discharge patient because her father is coming to [facility] to  patient. Physician and nursing updated.             Sita Pearce, RN, BSN  [facility] Management  [phone]
